# Patient Record
Sex: FEMALE | Race: WHITE | Employment: OTHER | URBAN - METROPOLITAN AREA
[De-identification: names, ages, dates, MRNs, and addresses within clinical notes are randomized per-mention and may not be internally consistent; named-entity substitution may affect disease eponyms.]

---

## 2017-02-09 ENCOUNTER — OFFICE VISIT (OUTPATIENT)
Dept: INTERNAL MEDICINE CLINIC | Facility: CLINIC | Age: 67
End: 2017-02-09

## 2017-02-09 VITALS
OXYGEN SATURATION: 98 % | SYSTOLIC BLOOD PRESSURE: 138 MMHG | DIASTOLIC BLOOD PRESSURE: 78 MMHG | BODY MASS INDEX: 51 KG/M2 | TEMPERATURE: 99 F | WEIGHT: 278.5 LBS | HEART RATE: 79 BPM | RESPIRATION RATE: 16 BRPM

## 2017-02-09 DIAGNOSIS — I10 ESSENTIAL HYPERTENSION: Primary | ICD-10-CM

## 2017-02-09 DIAGNOSIS — M17.0 OSTEOARTHRITIS OF BOTH KNEES, UNSPECIFIED OSTEOARTHRITIS TYPE: ICD-10-CM

## 2017-02-09 DIAGNOSIS — E78.5 HYPERLIPIDEMIA, UNSPECIFIED HYPERLIPIDEMIA TYPE: ICD-10-CM

## 2017-02-09 DIAGNOSIS — M19.071 PRIMARY OSTEOARTHRITIS OF RIGHT ANKLE: ICD-10-CM

## 2017-02-09 DIAGNOSIS — E55.9 VITAMIN D DEFICIENCY: ICD-10-CM

## 2017-02-09 DIAGNOSIS — R73.09 ELEVATED GLUCOSE: ICD-10-CM

## 2017-02-09 DIAGNOSIS — F32.A DEPRESSIVE DISORDER: ICD-10-CM

## 2017-02-09 PROCEDURE — 99214 OFFICE O/P EST MOD 30 MIN: CPT | Performed by: FAMILY MEDICINE

## 2017-02-09 RX ORDER — HYDROCODONE BITARTRATE AND ACETAMINOPHEN 5; 325 MG/1; MG/1
1 TABLET ORAL EVERY 6 HOURS PRN
Qty: 120 TABLET | Refills: 0 | Status: SHIPPED | OUTPATIENT
Start: 2017-04-10 | End: 2017-05-10

## 2017-02-09 RX ORDER — ATORVASTATIN CALCIUM 10 MG/1
10 TABLET, FILM COATED ORAL
Qty: 90 TABLET | Refills: 1 | Status: SHIPPED | OUTPATIENT
Start: 2017-02-09 | End: 2017-09-20

## 2017-02-09 RX ORDER — TRIAMTERENE AND HYDROCHLOROTHIAZIDE 37.5; 25 MG/1; MG/1
1 TABLET ORAL DAILY
Qty: 90 TABLET | Refills: 1 | Status: SHIPPED | OUTPATIENT
Start: 2017-02-09 | End: 2017-09-20

## 2017-02-09 RX ORDER — FLUOXETINE HYDROCHLORIDE 40 MG/1
40 CAPSULE ORAL DAILY
Qty: 90 CAPSULE | Refills: 1 | Status: SHIPPED | OUTPATIENT
Start: 2017-02-09 | End: 2017-12-26

## 2017-02-09 RX ORDER — HYDROCODONE BITARTRATE AND ACETAMINOPHEN 5; 325 MG/1; MG/1
1 TABLET ORAL EVERY 6 HOURS PRN
Qty: 120 TABLET | Refills: 0 | Status: SHIPPED | OUTPATIENT
Start: 2017-02-09 | End: 2017-03-11

## 2017-02-09 RX ORDER — FELODIPINE 10 MG/1
10 TABLET, EXTENDED RELEASE ORAL
Qty: 90 TABLET | Refills: 1 | Status: SHIPPED | OUTPATIENT
Start: 2017-02-09 | End: 2017-08-14

## 2017-02-09 RX ORDER — HYDROCODONE BITARTRATE AND ACETAMINOPHEN 5; 325 MG/1; MG/1
1 TABLET ORAL EVERY 6 HOURS PRN
Qty: 120 TABLET | Refills: 0 | Status: SHIPPED | OUTPATIENT
Start: 2017-03-11 | End: 2017-05-11

## 2017-02-09 NOTE — PROGRESS NOTES
CHIEF COMPLAINT:     Patient presents with: Follow - Up: check up      HPI:   Moise Gordon is a 61year old female   Patient presents for recheck of  Hypertension and hyperlipdemia.  Pt has been taking medications as instructed, no medication side effect HYDROcodone-acetaminophen (NORCO) 5-325 MG Oral Tab Take 1 tablet by mouth every 6 (six) hours as needed for Pain.  Disp: 120 tablet Rfl: 0   [START ON 4/10/2017] HYDROcodone-acetaminophen (NORCO) 5-325 MG Oral Tab Take 1 tablet by mouth every 6 (six) hours type  (primary encounter diagnosis)  Plan: COMP METABOLIC PANEL (14), LIPID PANEL,         Atorvastatin Calcium 10 MG Oral Tab, COMP         METABOLIC PANEL (14), LIPID PANEL        Pt to continue with exercise and increase as tolerated with arthritis.  Sug months.     Hyperlipidemia, unspecified hyperlipidemia type  (primary encounter diagnosis)  Essential hypertension  Osteoarthritis of both knees, unspecified osteoarthritis type  Primary osteoarthritis of right ankle  Vitamin d deficiency  Depressive disord

## 2017-03-03 LAB
ABSOLUTE BASOPHILS: 38 CELLS/UL (ref 0–200)
ABSOLUTE EOSINOPHILS: 309 CELLS/UL (ref 15–500)
ABSOLUTE LYMPHOCYTES: 1184 CELLS/UL (ref 850–3900)
ABSOLUTE MONOCYTES: 410 CELLS/UL (ref 200–950)
ABSOLUTE NEUTROPHILS: 4360 CELLS/UL (ref 1500–7800)
ALBUMIN/GLOBULIN RATIO: 1.3 (CALC) (ref 1–2.5)
ALBUMIN: 3.9 G/DL (ref 3.6–5.1)
ALKALINE PHOSPHATASE: 76 U/L (ref 33–130)
ALT: 32 U/L (ref 6–29)
AST: 23 U/L (ref 10–35)
BASOPHILS: 0.6 %
BILIRUBIN, TOTAL: 0.7 MG/DL (ref 0.2–1.2)
BUN: 13 MG/DL (ref 7–25)
CALCIUM: 9.4 MG/DL (ref 8.6–10.4)
CARBON DIOXIDE: 30 MMOL/L (ref 20–31)
CHLORIDE: 100 MMOL/L (ref 98–110)
CHOL/HDLC RATIO: 3.2 (CALC)
CHOLESTEROL, TOTAL: 166 MG/DL (ref 125–200)
CREATININE: 0.76 MG/DL (ref 0.5–0.99)
EGFR IF AFRICN AM: 96 ML/MIN/1.73M2
EGFR IF NONAFRICN AM: 83 ML/MIN/1.73M2
EOSINOPHILS: 4.9 %
GLOBULIN: 3.1 G/DL (CALC) (ref 1.9–3.7)
GLUCOSE: 146 MG/DL (ref 65–99)
HDL CHOLESTEROL: 52 MG/DL
HEMATOCRIT: 45.3 % (ref 35–45)
HEMOGLOBIN A1C: 6.6 % OF TOTAL HGB
HEMOGLOBIN: 14.8 G/DL (ref 11.7–15.5)
LDL-CHOLESTEROL: 89 MG/DL (CALC)
LYMPHOCYTES: 18.8 %
MCH: 28.8 PG (ref 27–33)
MCHC: 32.7 G/DL (ref 32–36)
MCV: 88.2 FL (ref 80–100)
MONOCYTES: 6.5 %
MPV: 8.9 FL (ref 7.5–12.5)
NEUTROPHILS: 69.2 %
NON-HDL CHOLESTEROL: 114 MG/DL (CALC)
PLATELET COUNT: 211 THOUSAND/UL (ref 140–400)
POTASSIUM: 4 MMOL/L (ref 3.5–5.3)
PROTEIN, TOTAL: 7 G/DL (ref 6.1–8.1)
RDW: 14.7 % (ref 11–15)
RED BLOOD CELL COUNT: 5.14 MILLION/UL (ref 3.8–5.1)
SODIUM: 139 MMOL/L (ref 135–146)
TRIGLYCERIDES: 127 MG/DL
VITAMIN D, 25-OH, TOTAL: 25 NG/ML (ref 30–100)
WHITE BLOOD CELL COUNT: 6.3 THOUSAND/UL (ref 3.8–10.8)

## 2017-03-08 ENCOUNTER — TELEPHONE (OUTPATIENT)
Dept: INTERNAL MEDICINE CLINIC | Facility: CLINIC | Age: 67
End: 2017-03-08

## 2017-03-08 RX ORDER — ERGOCALCIFEROL 1.25 MG/1
50000 CAPSULE ORAL WEEKLY
Qty: 12 CAPSULE | Refills: 1 | Status: SHIPPED | OUTPATIENT
Start: 2017-03-08 | End: 2017-09-20 | Stop reason: ALTCHOICE

## 2017-03-08 NOTE — TELEPHONE ENCOUNTER
Patient aware of lab results from 3/2/17 and [facility] recommendation.

## 2017-03-31 ENCOUNTER — OFFICE VISIT (OUTPATIENT)
Dept: FAMILY MEDICINE CLINIC | Facility: CLINIC | Age: 67
End: 2017-03-31

## 2017-03-31 ENCOUNTER — HOSPITAL ENCOUNTER (OUTPATIENT)
Dept: GENERAL RADIOLOGY | Age: 67
Discharge: HOME | End: 2017-03-31
Payer: COMMERCIAL

## 2017-03-31 VITALS
SYSTOLIC BLOOD PRESSURE: 138 MMHG | HEART RATE: 83 BPM | TEMPERATURE: 98 F | DIASTOLIC BLOOD PRESSURE: 78 MMHG | WEIGHT: 278 LBS | OXYGEN SATURATION: 98 % | BODY MASS INDEX: 51 KG/M2 | RESPIRATION RATE: 22 BRPM

## 2017-03-31 DIAGNOSIS — J20.9 ACUTE BRONCHITIS, UNSPECIFIED ORGANISM: ICD-10-CM

## 2017-03-31 DIAGNOSIS — J20.9 ACUTE BRONCHITIS, UNSPECIFIED ORGANISM: Primary | ICD-10-CM

## 2017-03-31 DIAGNOSIS — R05.9 COUGH: ICD-10-CM

## 2017-03-31 PROCEDURE — 99213 OFFICE O/P EST LOW 20 MIN: CPT

## 2017-03-31 PROCEDURE — 71020 XR CHEST PA + LAT CHEST (CPT=71020): CPT

## 2017-03-31 PROCEDURE — 94640 AIRWAY INHALATION TREATMENT: CPT

## 2017-03-31 RX ORDER — ALBUTEROL SULFATE 2.5 MG/3ML
2.5 SOLUTION RESPIRATORY (INHALATION) ONCE
Status: COMPLETED | OUTPATIENT
Start: 2017-03-31 | End: 2017-03-31

## 2017-03-31 RX ORDER — ALBUTEROL SULFATE 90 UG/1
2 AEROSOL, METERED RESPIRATORY (INHALATION) EVERY 6 HOURS PRN
Qty: 1 INHALER | Refills: 0 | Status: SHIPPED | OUTPATIENT
Start: 2017-03-31 | End: 2017-09-20

## 2017-03-31 RX ORDER — GUAIFENESIN AND CODEINE PHOSPHATE 100; 10 MG/5ML; MG/5ML
SOLUTION ORAL
Qty: 180 ML | Refills: 0 | Status: SHIPPED | OUTPATIENT
Start: 2017-03-31 | End: 2017-09-20

## 2017-03-31 RX ORDER — CEFDINIR 300 MG/1
300 CAPSULE ORAL 2 TIMES DAILY
Qty: 20 CAPSULE | Refills: 0 | Status: SHIPPED | OUTPATIENT
Start: 2017-03-31 | End: 2017-04-10

## 2017-03-31 RX ADMIN — ALBUTEROL SULFATE 2.5 MG: 2.5 SOLUTION RESPIRATORY (INHALATION) at 14:18:00

## 2017-03-31 NOTE — PROGRESS NOTES
CHIEF COMPLAINT:   Patient presents with:  URI: since 3/22/17    HPI:   Shon Swanson is a 61year old female who presents with upper respiratory symptoms for  9  days. Patient reports congestion, low grade fever, dry cough, wheezing.   is sick with • Depression           Past Surgical History    CHOLECYSTECTOMY      OTHER SURGICAL HISTORY  1996, 1997    Comment left femoral angiogram/angioplasty and stent    OTHER SURGICAL HISTORY  age 34    Comment right ankle fx (slipped on ice)      Family History Karyn Camp is a 61year old female who presents with upper respiratory symptoms that are consistent with    ASSESSMENT:   Acute bronchitis, unspecified organism  (primary encounter diagnosis)    PLAN: Meds as below.   Comfort care as described in [address] [place] [address] Bronchitis often occurs with a cold or the flu virus. The airways become inflamed (red and swollen). There is a deep “hacking” cough from the extra mucus.  Other symptoms may include:  · Wheezing  · Chest discomfort  · Shortness of breath  · Mild fever  A s · Ask your provider about using cough medicine, pain and fever medicine, or a decongestant. Antibiotics  Most cases of bronchitis are caused by cold or flu viruses. Antibiotics don’t treat viral illness.  Taking antibiotics when they are not needed increas

## 2017-05-11 ENCOUNTER — TELEPHONE (OUTPATIENT)
Dept: INTERNAL MEDICINE CLINIC | Facility: CLINIC | Age: 67
End: 2017-05-11

## 2017-05-11 DIAGNOSIS — M17.0 OSTEOARTHRITIS OF BOTH KNEES, UNSPECIFIED OSTEOARTHRITIS TYPE: Primary | ICD-10-CM

## 2017-05-11 RX ORDER — HYDROCODONE BITARTRATE AND ACETAMINOPHEN 5; 325 MG/1; MG/1
1 TABLET ORAL EVERY 6 HOURS PRN
Qty: 100 TABLET | Refills: 0 | Status: SHIPPED | OUTPATIENT
Start: 2017-05-11 | End: 2017-06-10

## 2017-05-11 NOTE — TELEPHONE ENCOUNTER
i will give her 100 w no refills   She is taking high amounts of narcotics which worries me   Why does she use so much ?

## 2017-05-11 NOTE — TELEPHONE ENCOUNTER
Takes coumadin , been on this med from Dr Aditya Mcgill for at least 5 years , normaly takes 1 tab every 6 hours for arthritis in hands knees ,feet Pt will  tomorrow

## 2017-06-07 ENCOUNTER — TELEPHONE (OUTPATIENT)
Dept: INTERNAL MEDICINE CLINIC | Facility: CLINIC | Age: 67
End: 2017-06-07

## 2017-06-07 DIAGNOSIS — M17.0 OSTEOARTHRITIS OF BOTH KNEES, UNSPECIFIED OSTEOARTHRITIS TYPE: Primary | ICD-10-CM

## 2017-06-08 RX ORDER — HYDROCODONE BITARTRATE AND ACETAMINOPHEN 5; 325 MG/1; MG/1
1 TABLET ORAL EVERY 6 HOURS PRN
Qty: 100 TABLET | Refills: 0 | Status: SHIPPED | OUTPATIENT
Start: 2017-08-07 | End: 2017-09-06

## 2017-06-08 RX ORDER — HYDROCODONE BITARTRATE AND ACETAMINOPHEN 5; 325 MG/1; MG/1
1 TABLET ORAL EVERY 6 HOURS PRN
Qty: 100 TABLET | Refills: 0 | Status: SHIPPED | OUTPATIENT
Start: 2017-06-08 | End: 2017-07-08

## 2017-06-08 RX ORDER — HYDROCODONE BITARTRATE AND ACETAMINOPHEN 5; 325 MG/1; MG/1
1 TABLET ORAL EVERY 6 HOURS PRN
Qty: 100 TABLET | Refills: 0 | Status: SHIPPED | OUTPATIENT
Start: 2017-07-08 | End: 2017-08-07

## 2017-06-27 ENCOUNTER — HOSPITAL (OUTPATIENT)
Dept: OTHER | Age: 67
End: 2017-06-27
Attending: INTERNAL MEDICINE

## 2017-06-27 LAB — INR BLDC: 1.4

## 2017-06-29 LAB — INR BLDC: 1.5

## 2017-07-01 ENCOUNTER — HOSPITAL (OUTPATIENT)
Dept: OTHER | Age: 67
End: 2017-07-01
Attending: INTERNAL MEDICINE

## 2017-07-03 LAB — INR BLDC: 2.8

## 2017-07-05 ENCOUNTER — TELEPHONE (OUTPATIENT)
Dept: INTERNAL MEDICINE CLINIC | Facility: CLINIC | Age: 67
End: 2017-07-05

## 2017-07-05 DIAGNOSIS — I10 ESSENTIAL HYPERTENSION: ICD-10-CM

## 2017-07-05 DIAGNOSIS — R73.09 OTHER ABNORMAL GLUCOSE: ICD-10-CM

## 2017-07-05 DIAGNOSIS — E55.9 VITAMIN D DEFICIENCY: ICD-10-CM

## 2017-07-05 DIAGNOSIS — E78.2 MIXED HYPERLIPIDEMIA: Primary | ICD-10-CM

## 2017-07-05 RX ORDER — HYDROCODONE BITARTRATE AND ACETAMINOPHEN 5; 325 MG/1; MG/1
1 TABLET ORAL EVERY 6 HOURS PRN
Qty: 30 TABLET | Refills: 0 | Status: SHIPPED | OUTPATIENT
Start: 2017-07-05 | End: 2017-07-05 | Stop reason: CLARIF

## 2017-07-05 RX ORDER — HYDROCODONE BITARTRATE AND ACETAMINOPHEN 5; 325 MG/1; MG/1
1 TABLET ORAL EVERY 6 HOURS PRN
Qty: 120 TABLET | Refills: 0 | Status: SHIPPED | OUTPATIENT
Start: 2017-09-03 | End: 2017-10-03

## 2017-07-05 RX ORDER — HYDROCODONE BITARTRATE AND ACETAMINOPHEN 5; 325 MG/1; MG/1
1 TABLET ORAL EVERY 6 HOURS PRN
Qty: 30 TABLET | Refills: 0 | Status: SHIPPED | OUTPATIENT
Start: 2017-08-04 | End: 2017-07-05 | Stop reason: CLARIF

## 2017-07-05 RX ORDER — HYDROCODONE BITARTRATE AND ACETAMINOPHEN 5; 325 MG/1; MG/1
1 TABLET ORAL EVERY 6 HOURS PRN
Qty: 30 TABLET | Refills: 0 | Status: SHIPPED | OUTPATIENT
Start: 2017-09-03 | End: 2017-07-05 | Stop reason: CLARIF

## 2017-07-05 RX ORDER — HYDROCODONE BITARTRATE AND ACETAMINOPHEN 5; 325 MG/1; MG/1
1 TABLET ORAL EVERY 6 HOURS PRN
Qty: 120 TABLET | Refills: 0 | Status: SHIPPED | OUTPATIENT
Start: 2017-08-04 | End: 2017-09-03

## 2017-07-05 RX ORDER — HYDROCODONE BITARTRATE AND ACETAMINOPHEN 5; 325 MG/1; MG/1
1 TABLET ORAL EVERY 6 HOURS PRN
Qty: 120 TABLET | Refills: 0 | Status: SHIPPED | OUTPATIENT
Start: 2017-07-05 | End: 2017-08-04

## 2017-07-05 NOTE — TELEPHONE ENCOUNTER
Patient informed scripts ready for pick-up, bring back incorrect scripts and request to speak with Violeta or Chandrakant Eubanks, patient verbalized understanding

## 2017-07-05 NOTE — TELEPHONE ENCOUNTER
Vidal Pitts for the [employer], they have been corrected, but Pt will need to bring back the July and august script that was done incorrectly. Pt is due for labs and an appointment at the end of AUgust /beginning of September. Her lab orders are in the EMR.

## 2017-07-06 ENCOUNTER — TELEPHONE (OUTPATIENT)
Dept: INTERNAL MEDICINE CLINIC | Facility: CLINIC | Age: 67
End: 2017-07-06

## 2017-07-06 NOTE — TELEPHONE ENCOUNTER
Patient returned 2 script for Marylou Sewell #100 each script 7/6/17.  New script for Norco 5/325 #120 (2) given to patient

## 2017-08-01 ENCOUNTER — HOSPITAL (OUTPATIENT)
Dept: OTHER | Age: 67
End: 2017-08-01
Attending: INTERNAL MEDICINE

## 2017-08-03 LAB — INR BLDC: 1.8

## 2017-08-14 DIAGNOSIS — I10 ESSENTIAL HYPERTENSION: ICD-10-CM

## 2017-08-17 RX ORDER — FELODIPINE 10 MG/1
10 TABLET, EXTENDED RELEASE ORAL DAILY
Qty: 30 TABLET | Refills: 0 | Status: SHIPPED | OUTPATIENT
Start: 2017-08-17 | End: 2017-09-20

## 2017-09-20 ENCOUNTER — OFFICE VISIT (OUTPATIENT)
Dept: INTERNAL MEDICINE CLINIC | Facility: CLINIC | Age: 67
End: 2017-09-20

## 2017-09-20 ENCOUNTER — APPOINTMENT (OUTPATIENT)
Dept: LAB | Age: 67
End: 2017-09-20
Attending: FAMILY MEDICINE
Payer: COMMERCIAL

## 2017-09-20 VITALS
OXYGEN SATURATION: 97 % | SYSTOLIC BLOOD PRESSURE: 138 MMHG | WEIGHT: 273.75 LBS | DIASTOLIC BLOOD PRESSURE: 80 MMHG | TEMPERATURE: 99 F | BODY MASS INDEX: 50 KG/M2 | HEART RATE: 63 BPM

## 2017-09-20 DIAGNOSIS — F32.A DEPRESSION, UNSPECIFIED DEPRESSION TYPE: ICD-10-CM

## 2017-09-20 DIAGNOSIS — E55.9 VITAMIN D DEFICIENCY: ICD-10-CM

## 2017-09-20 DIAGNOSIS — I10 ESSENTIAL HYPERTENSION: ICD-10-CM

## 2017-09-20 DIAGNOSIS — I10 ESSENTIAL HYPERTENSION: Primary | ICD-10-CM

## 2017-09-20 DIAGNOSIS — M19.071 OSTEOARTHRITIS OF RIGHT ANKLE, UNSPECIFIED OSTEOARTHRITIS TYPE: ICD-10-CM

## 2017-09-20 DIAGNOSIS — R73.09 OTHER ABNORMAL GLUCOSE: ICD-10-CM

## 2017-09-20 DIAGNOSIS — E78.2 MIXED HYPERLIPIDEMIA: ICD-10-CM

## 2017-09-20 LAB
25-HYDROXYVITAMIN D (TOTAL): 45.8 NG/ML (ref 30–100)
ALBUMIN SERPL-MCNC: 3.7 G/DL (ref 3.5–4.8)
ALP LIVER SERPL-CCNC: 75 U/L (ref 50–130)
ALT SERPL-CCNC: 34 U/L (ref 14–54)
AST SERPL-CCNC: 19 U/L (ref 15–41)
BILIRUB SERPL-MCNC: 0.8 MG/DL (ref 0.1–2)
BUN BLD-MCNC: 16 MG/DL (ref 8–20)
CALCIUM BLD-MCNC: 9.4 MG/DL (ref 8.3–10.3)
CHLORIDE: 101 MMOL/L (ref 101–111)
CHOLEST SMN-MCNC: 151 MG/DL (ref ?–200)
CO2: 31 MMOL/L (ref 22–32)
CREAT BLD-MCNC: 0.84 MG/DL (ref 0.55–1.02)
EST. AVERAGE GLUCOSE BLD GHB EST-MCNC: 131 MG/DL (ref 68–126)
GLUCOSE BLD-MCNC: 147 MG/DL (ref 70–99)
HBA1C MFR BLD HPLC: 6.2 % (ref ?–5.7)
HDLC SERPL-MCNC: 54 MG/DL (ref 45–?)
HDLC SERPL: 2.8 {RATIO} (ref ?–4.44)
LDLC SERPL CALC-MCNC: 71 MG/DL (ref ?–130)
LDLC SERPL-MCNC: 26 MG/DL (ref 5–40)
M PROTEIN MFR SERPL ELPH: 7.8 G/DL (ref 6.1–8.3)
NONHDLC SERPL-MCNC: 97 MG/DL (ref ?–130)
POTASSIUM SERPL-SCNC: 3.9 MMOL/L (ref 3.6–5.1)
SODIUM SERPL-SCNC: 138 MMOL/L (ref 136–144)
TRIGLYCERIDES: 132 MG/DL (ref ?–150)

## 2017-09-20 PROCEDURE — 99214 OFFICE O/P EST MOD 30 MIN: CPT | Performed by: FAMILY MEDICINE

## 2017-09-20 PROCEDURE — 80061 LIPID PANEL: CPT | Performed by: FAMILY MEDICINE

## 2017-09-20 PROCEDURE — 80053 COMPREHEN METABOLIC PANEL: CPT | Performed by: FAMILY MEDICINE

## 2017-09-20 PROCEDURE — 83036 HEMOGLOBIN GLYCOSYLATED A1C: CPT | Performed by: FAMILY MEDICINE

## 2017-09-20 PROCEDURE — 36415 COLL VENOUS BLD VENIPUNCTURE: CPT | Performed by: FAMILY MEDICINE

## 2017-09-20 PROCEDURE — 82306 VITAMIN D 25 HYDROXY: CPT | Performed by: FAMILY MEDICINE

## 2017-09-20 RX ORDER — HYDROCODONE BITARTRATE AND ACETAMINOPHEN 5; 325 MG/1; MG/1
1 TABLET ORAL EVERY 6 HOURS PRN
Qty: 120 TABLET | Refills: 0 | Status: SHIPPED | OUTPATIENT
Start: 2017-10-20 | End: 2017-11-19

## 2017-09-20 RX ORDER — HYDROCODONE BITARTRATE AND ACETAMINOPHEN 5; 325 MG/1; MG/1
1 TABLET ORAL EVERY 6 HOURS PRN
Qty: 120 TABLET | Refills: 0 | Status: SHIPPED | OUTPATIENT
Start: 2017-11-19 | End: 2017-12-19

## 2017-09-20 RX ORDER — ATORVASTATIN CALCIUM 10 MG/1
10 TABLET, FILM COATED ORAL
Qty: 90 TABLET | Refills: 1 | Status: SHIPPED | OUTPATIENT
Start: 2017-09-20 | End: 2018-03-21

## 2017-09-20 RX ORDER — HYDROCODONE BITARTRATE AND ACETAMINOPHEN 5; 325 MG/1; MG/1
1 TABLET ORAL EVERY 6 HOURS PRN
Qty: 120 TABLET | Refills: 0 | Status: SHIPPED | OUTPATIENT
Start: 2017-09-20 | End: 2017-10-20

## 2017-09-20 RX ORDER — FELODIPINE 10 MG/1
10 TABLET, EXTENDED RELEASE ORAL DAILY
Qty: 90 TABLET | Refills: 1 | Status: SHIPPED | OUTPATIENT
Start: 2017-09-20 | End: 2018-03-19

## 2017-09-20 RX ORDER — TRIAMTERENE AND HYDROCHLOROTHIAZIDE 37.5; 25 MG/1; MG/1
1 TABLET ORAL DAILY
Qty: 90 TABLET | Refills: 1 | Status: SHIPPED | OUTPATIENT
Start: 2017-09-20 | End: 2018-03-21

## 2017-09-20 NOTE — PROGRESS NOTES
CHIEF COMPLAINT:     Patient presents with:  Medication Follow-Up      HPI:   Ameena Rojas is a 74year old female   Patient presents for recheck of  Hypertension and hyperlipdemia.  Pt has been taking medications as instructed, no medication side effects TABLET BY MOUTH DAILY (Patient taking differently: TAKE 1 TABLET BY MOUTH MONDAY, [address], [address] [facility], [address] [facility].   TAKE 1/2 TABLET BY MOUTH WEDNESDAY, FRIDAY, SUNDAY.) Disp: 90 tablet Rfl: 0      Past Medical History:   Diagnosis Date   • Depression    • Hype Speech, mood and affect are appropriate      ASSESSMENT AND PLAN:     1. Mixed hyperlipidemia  Stable, continue low fat diet, exercise,loose weight  - atorvastatin 10 MG Oral Tab; Take 1 tablet (10 mg total) by mouth once daily. Dispense: 90 tablet;  Refi HYDROcodone-acetaminophen (NORCO) 5-325 MG Oral Tab 120 tablet 0      Sig: Take 1 tablet by mouth every 6 (six) hours as needed for Pain.       HYDROcodone-acetaminophen (NORCO) 5-325 MG Oral Tab 120 tablet 0      Sig: Take 1 tablet by mouth every 6 (six) h

## 2017-12-20 ENCOUNTER — TELEPHONE (OUTPATIENT)
Dept: INTERNAL MEDICINE CLINIC | Facility: CLINIC | Age: 67
End: 2017-12-20

## 2017-12-20 RX ORDER — HYDROCODONE BITARTRATE AND ACETAMINOPHEN 5; 325 MG/1; MG/1
1 TABLET ORAL EVERY 6 HOURS PRN
Qty: 120 TABLET | Refills: 0 | Status: SHIPPED | OUTPATIENT
Start: 2018-01-19 | End: 2018-02-18

## 2017-12-20 RX ORDER — HYDROCODONE BITARTRATE AND ACETAMINOPHEN 5; 325 MG/1; MG/1
1 TABLET ORAL EVERY 6 HOURS PRN
Qty: 120 TABLET | Refills: 0 | Status: SHIPPED | OUTPATIENT
Start: 2018-02-18 | End: 2018-03-20

## 2017-12-20 RX ORDER — HYDROCODONE BITARTRATE AND ACETAMINOPHEN 5; 325 MG/1; MG/1
1 TABLET ORAL EVERY 6 HOURS PRN
Qty: 120 TABLET | Refills: 0 | Status: SHIPPED | OUTPATIENT
Start: 2017-12-20 | End: 2018-01-19

## 2017-12-20 NOTE — TELEPHONE ENCOUNTER
Patient calling to request refill for hydrocone. Patient usually gets 3 refills and picks up prescription refill.  Please call patient back

## 2017-12-26 ENCOUNTER — TELEPHONE (OUTPATIENT)
Dept: INTERNAL MEDICINE CLINIC | Facility: CLINIC | Age: 67
End: 2017-12-26

## 2017-12-26 DIAGNOSIS — F32.A DEPRESSIVE DISORDER: ICD-10-CM

## 2017-12-26 RX ORDER — FLUOXETINE HYDROCHLORIDE 40 MG/1
40 CAPSULE ORAL DAILY
Qty: 90 CAPSULE | Refills: 0 | Status: SHIPPED | OUTPATIENT
Start: 2017-12-26 | End: 2018-03-21

## 2017-12-26 NOTE — TELEPHONE ENCOUNTER
Patient calling in requesting a refill for FLUoxetine HCl 40 MG Oral Cap    To be sent to:  [facility] ( on file)

## 2018-01-24 ENCOUNTER — PRIOR ORIGINAL RECORDS (OUTPATIENT)
Dept: OTHER | Age: 68
End: 2018-01-24

## 2018-02-01 ENCOUNTER — HOSPITAL (OUTPATIENT)
Dept: OTHER | Age: 68
End: 2018-02-01
Attending: INTERNAL MEDICINE

## 2018-02-01 LAB — INR BLDC: 2.7

## 2018-02-26 ENCOUNTER — PRIOR ORIGINAL RECORDS (OUTPATIENT)
Dept: OTHER | Age: 68
End: 2018-02-26

## 2018-02-26 ENCOUNTER — MYAURORA ACCOUNT LINK (OUTPATIENT)
Dept: OTHER | Age: 68
End: 2018-02-26

## 2018-03-17 ENCOUNTER — TELEPHONE (OUTPATIENT)
Dept: FAMILY MEDICINE CLINIC | Facility: CLINIC | Age: 68
End: 2018-03-17

## 2018-03-17 NOTE — TELEPHONE ENCOUNTER
Pt called with urinary frequency and burning. Sx just started today. No previous hx of UTI. No fever or chills. Recommend she can go to [address] for evaluation. Pt would prefer to wait and see [facility] on Monday. Encouraged her to increase water intake.   If

## 2018-03-19 DIAGNOSIS — I10 ESSENTIAL HYPERTENSION: ICD-10-CM

## 2018-03-19 RX ORDER — FELODIPINE 10 MG/1
10 TABLET, EXTENDED RELEASE ORAL DAILY
Qty: 30 TABLET | Refills: 0 | Status: SHIPPED | OUTPATIENT
Start: 2018-03-19 | End: 2018-03-21

## 2018-03-19 NOTE — TELEPHONE ENCOUNTER
Patient called to request refill for Felodipine ER 10 MG. Patient states she is out of medication.  Patient is scheduled for 03/21/18

## 2018-03-21 ENCOUNTER — OFFICE VISIT (OUTPATIENT)
Dept: INTERNAL MEDICINE CLINIC | Facility: CLINIC | Age: 68
End: 2018-03-21

## 2018-03-21 VITALS
OXYGEN SATURATION: 97 % | BODY MASS INDEX: 53 KG/M2 | RESPIRATION RATE: 14 BRPM | WEIGHT: 286.5 LBS | SYSTOLIC BLOOD PRESSURE: 122 MMHG | HEART RATE: 71 BPM | TEMPERATURE: 99 F | DIASTOLIC BLOOD PRESSURE: 82 MMHG

## 2018-03-21 DIAGNOSIS — M17.0 OSTEOARTHRITIS OF BOTH KNEES, UNSPECIFIED OSTEOARTHRITIS TYPE: ICD-10-CM

## 2018-03-21 DIAGNOSIS — Z13.29 SCREENING FOR THYROID DISORDER: ICD-10-CM

## 2018-03-21 DIAGNOSIS — Z78.0 POST-MENOPAUSAL: ICD-10-CM

## 2018-03-21 DIAGNOSIS — E78.5 HYPERLIPIDEMIA, UNSPECIFIED HYPERLIPIDEMIA TYPE: ICD-10-CM

## 2018-03-21 DIAGNOSIS — I10 HTN (HYPERTENSION), BENIGN: ICD-10-CM

## 2018-03-21 DIAGNOSIS — N39.0 URINARY TRACT INFECTION WITH HEMATURIA, SITE UNSPECIFIED: ICD-10-CM

## 2018-03-21 DIAGNOSIS — R73.09 OTHER ABNORMAL GLUCOSE: ICD-10-CM

## 2018-03-21 DIAGNOSIS — Z23 NEED FOR PNEUMOCOCCAL VACCINATION: ICD-10-CM

## 2018-03-21 DIAGNOSIS — Z12.31 ENCOUNTER FOR SCREENING MAMMOGRAM FOR MALIGNANT NEOPLASM OF BREAST: ICD-10-CM

## 2018-03-21 DIAGNOSIS — F32.A DEPRESSIVE DISORDER: ICD-10-CM

## 2018-03-21 DIAGNOSIS — E78.2 MIXED HYPERLIPIDEMIA: ICD-10-CM

## 2018-03-21 DIAGNOSIS — M19.071 OSTEOARTHRITIS OF RIGHT ANKLE, UNSPECIFIED OSTEOARTHRITIS TYPE: ICD-10-CM

## 2018-03-21 DIAGNOSIS — R31.9 URINARY TRACT INFECTION WITH HEMATURIA, SITE UNSPECIFIED: ICD-10-CM

## 2018-03-21 DIAGNOSIS — E55.9 VITAMIN D DEFICIENCY: ICD-10-CM

## 2018-03-21 DIAGNOSIS — Z13.0 SCREENING FOR IRON DEFICIENCY ANEMIA: ICD-10-CM

## 2018-03-21 DIAGNOSIS — Z12.11 SCREEN FOR COLON CANCER: ICD-10-CM

## 2018-03-21 DIAGNOSIS — I10 ESSENTIAL HYPERTENSION: Primary | ICD-10-CM

## 2018-03-21 DIAGNOSIS — F32.A DEPRESSION, UNSPECIFIED DEPRESSION TYPE: ICD-10-CM

## 2018-03-21 LAB
BILIRUBIN: NEGATIVE
GLUCOSE (URINE DIPSTICK): NEGATIVE MG/DL
KETONES (URINE DIPSTICK): NEGATIVE MG/DL
MULTISTIX LOT#: ABNORMAL NUMERIC
NITRITE, URINE: POSITIVE
PH, URINE: 6 (ref 4.5–8)
PROTEIN (URINE DIPSTICK): 100 MG/DL
SPECIFIC GRAVITY: 1.01 (ref 1–1.03)
URINE-COLOR: YELLOW
UROBILINOGEN,SEMI-QN: 0.2 MG/DL (ref 0–1.9)

## 2018-03-21 PROCEDURE — 87186 SC STD MICRODIL/AGAR DIL: CPT | Performed by: FAMILY MEDICINE

## 2018-03-21 PROCEDURE — 99214 OFFICE O/P EST MOD 30 MIN: CPT | Performed by: FAMILY MEDICINE

## 2018-03-21 PROCEDURE — 87086 URINE CULTURE/COLONY COUNT: CPT | Performed by: FAMILY MEDICINE

## 2018-03-21 PROCEDURE — 90670 PCV13 VACCINE IM: CPT | Performed by: FAMILY MEDICINE

## 2018-03-21 PROCEDURE — 81003 URINALYSIS AUTO W/O SCOPE: CPT | Performed by: FAMILY MEDICINE

## 2018-03-21 PROCEDURE — 87077 CULTURE AEROBIC IDENTIFY: CPT | Performed by: FAMILY MEDICINE

## 2018-03-21 PROCEDURE — 90471 IMMUNIZATION ADMIN: CPT | Performed by: FAMILY MEDICINE

## 2018-03-21 RX ORDER — HYDROCODONE BITARTRATE AND ACETAMINOPHEN 5; 325 MG/1; MG/1
1 TABLET ORAL EVERY 6 HOURS PRN
COMMUNITY
End: 2019-03-06

## 2018-03-21 RX ORDER — TRIAMTERENE AND HYDROCHLOROTHIAZIDE 37.5; 25 MG/1; MG/1
1 TABLET ORAL DAILY
Qty: 90 TABLET | Refills: 0 | Status: SHIPPED | OUTPATIENT
Start: 2018-03-21 | End: 2018-06-24

## 2018-03-21 RX ORDER — HYDROCODONE BITARTRATE AND ACETAMINOPHEN 5; 325 MG/1; MG/1
1 TABLET ORAL EVERY 8 HOURS PRN
Qty: 120 TABLET | Refills: 0 | Status: SHIPPED | OUTPATIENT
Start: 2018-03-21 | End: 2018-04-02 | Stop reason: CLARIF

## 2018-03-21 RX ORDER — NITROFURANTOIN 25; 75 MG/1; MG/1
100 CAPSULE ORAL 2 TIMES DAILY
Qty: 14 CAPSULE | Refills: 0 | Status: SHIPPED | OUTPATIENT
Start: 2018-03-21 | End: 2018-06-11 | Stop reason: ALTCHOICE

## 2018-03-21 RX ORDER — FELODIPINE 10 MG/1
10 TABLET, EXTENDED RELEASE ORAL DAILY
Qty: 90 TABLET | Refills: 0 | Status: SHIPPED | OUTPATIENT
Start: 2018-03-21 | End: 2018-07-17

## 2018-03-21 RX ORDER — FLUOXETINE HYDROCHLORIDE 40 MG/1
40 CAPSULE ORAL DAILY
Qty: 90 CAPSULE | Refills: 0 | Status: SHIPPED | OUTPATIENT
Start: 2018-03-21 | End: 2018-06-24

## 2018-03-21 RX ORDER — HYDROCODONE BITARTRATE AND ACETAMINOPHEN 5; 325 MG/1; MG/1
1 TABLET ORAL EVERY 8 HOURS PRN
Qty: 120 TABLET | Refills: 0 | Status: SHIPPED | OUTPATIENT
Start: 2018-05-20 | End: 2018-04-02 | Stop reason: CLARIF

## 2018-03-21 RX ORDER — ATORVASTATIN CALCIUM 10 MG/1
10 TABLET, FILM COATED ORAL
Qty: 90 TABLET | Refills: 0 | Status: SHIPPED | OUTPATIENT
Start: 2018-03-21 | End: 2018-04-27

## 2018-03-21 RX ORDER — HYDROCODONE BITARTRATE AND ACETAMINOPHEN 5; 325 MG/1; MG/1
1 TABLET ORAL EVERY 8 HOURS PRN
Qty: 120 TABLET | Refills: 0 | Status: SHIPPED | OUTPATIENT
Start: 2018-04-20 | End: 2018-04-02 | Stop reason: CLARIF

## 2018-03-21 RX ORDER — GLUCOSAMINE HCL 500 MG
1 TABLET ORAL DAILY
COMMUNITY
End: 2018-09-12

## 2018-03-21 NOTE — PROGRESS NOTES
CHIEF COMPLAINT:     Patient presents with:  Medication Follow-Up  UTI      HPI:   Emily Gonzalez is a 74year old female   Patient presents for recheck of  Hypertension and hyperlipdemia.  Pt has been taking medications as instructed, no medication side ef (MAXZIDE-25) 37.5-25 MG Oral Tab Take 1 tablet by mouth daily. Disp: 90 tablet Rfl: 0   FLUoxetine HCl 40 MG Oral Cap Take 1 capsule (40 mg total) by mouth daily.  Disp: 90 capsule Rfl: 0   Felodipine ER 10 MG Oral Tablet 24 Hr Take 1 tablet (10 mg total) b Denies shortness of breath, cough, or wheezing  : see HPI  NEURO: Denies headaches or lightheadedness  PSYCH: see HPI    EXAM:   /82   Pulse 71   Temp 98.5 °F (36.9 °C) (Oral)   Resp 14   Wt 286 lb 8 oz   SpO2 97%   Breastfeeding?  No   BMI 52.83 kg tablet 0      Sig: Take 1 tablet by mouth daily. FLUoxetine HCl 40 MG Oral Cap 90 capsule 0      Sig: Take 1 capsule (40 mg total) by mouth daily.       Felodipine ER 10 MG Oral Tablet 24 Hr 90 tablet 0      Sig: Take 1 tablet (10 mg total) by mouth da increase exercise,  choose better fats,  increase fiber and avoid processed foods. - atorvastatin 10 MG Oral Tab; Take 1 tablet (10 mg total) by mouth once daily. Dispense: 90 tablet; Refill: 0    8.  Depressive disorder  - stable, continue medication patient is asked to return in 6 months.

## 2018-04-02 ENCOUNTER — TELEPHONE (OUTPATIENT)
Dept: INTERNAL MEDICINE CLINIC | Facility: CLINIC | Age: 68
End: 2018-04-02

## 2018-04-02 RX ORDER — HYDROCODONE BITARTRATE AND ACETAMINOPHEN 5; 325 MG/1; MG/1
1 TABLET ORAL EVERY 6 HOURS PRN
Qty: 120 TABLET | Refills: 0 | Status: SHIPPED | OUTPATIENT
Start: 2018-06-01 | End: 2018-07-01

## 2018-04-02 RX ORDER — HYDROCODONE BITARTRATE AND ACETAMINOPHEN 5; 325 MG/1; MG/1
1 TABLET ORAL EVERY 6 HOURS PRN
Qty: 120 TABLET | Refills: 0 | Status: SHIPPED | OUTPATIENT
Start: 2018-04-02 | End: 2018-05-02

## 2018-04-02 RX ORDER — HYDROCODONE BITARTRATE AND ACETAMINOPHEN 5; 325 MG/1; MG/1
1 TABLET ORAL EVERY 6 HOURS PRN
Qty: 120 TABLET | Refills: 0 | Status: SHIPPED | OUTPATIENT
Start: 2018-05-02 | End: 2018-06-01

## 2018-04-02 NOTE — TELEPHONE ENCOUNTER
Pt advised to bring back 3 prescriptions [facility] has first month. she will call them and see if she can still  original rx she dropped off she never picked up pills yet

## 2018-04-02 NOTE — TELEPHONE ENCOUNTER
Patient recently received RX for Norco; the pharmacist is questioning dose prescribed because per patient it has always been every 8 hours taking it; on RX sent it says every 6 hours. Please call to discuss with patient.   Patient did not  RX until s

## 2018-04-27 PROBLEM — N95.0 POSTMENOPAUSAL BLEEDING: Status: ACTIVE | Noted: 2018-04-27

## 2018-06-08 ENCOUNTER — HOSPITAL (OUTPATIENT)
Dept: OTHER | Age: 68
End: 2018-06-08
Attending: INTERNAL MEDICINE

## 2018-06-08 LAB — INR BLDC: 2.6

## 2018-06-11 ENCOUNTER — OFFICE VISIT (OUTPATIENT)
Dept: INTERNAL MEDICINE CLINIC | Facility: CLINIC | Age: 68
End: 2018-06-11

## 2018-06-11 ENCOUNTER — APPOINTMENT (OUTPATIENT)
Dept: LAB | Age: 68
End: 2018-06-11
Attending: OBSTETRICS & GYNECOLOGY
Payer: COMMERCIAL

## 2018-06-11 ENCOUNTER — PRIOR ORIGINAL RECORDS (OUTPATIENT)
Dept: OTHER | Age: 68
End: 2018-06-11

## 2018-06-11 ENCOUNTER — LAB ENCOUNTER (OUTPATIENT)
Dept: LAB | Age: 68
End: 2018-06-11
Attending: OBSTETRICS & GYNECOLOGY
Payer: COMMERCIAL

## 2018-06-11 VITALS
WEIGHT: 291.5 LBS | TEMPERATURE: 99 F | HEART RATE: 67 BPM | BODY MASS INDEX: 53.64 KG/M2 | SYSTOLIC BLOOD PRESSURE: 128 MMHG | DIASTOLIC BLOOD PRESSURE: 86 MMHG | OXYGEN SATURATION: 98 % | HEIGHT: 61.75 IN | RESPIRATION RATE: 16 BRPM

## 2018-06-11 DIAGNOSIS — I10 ESSENTIAL HYPERTENSION: ICD-10-CM

## 2018-06-11 DIAGNOSIS — R73.09 OTHER ABNORMAL GLUCOSE: ICD-10-CM

## 2018-06-11 DIAGNOSIS — R93.89 THICKENED ENDOMETRIUM: ICD-10-CM

## 2018-06-11 DIAGNOSIS — Z13.29 SCREENING FOR THYROID DISORDER: ICD-10-CM

## 2018-06-11 DIAGNOSIS — Z01.818 PRE-OPERATIVE CLEARANCE: ICD-10-CM

## 2018-06-11 DIAGNOSIS — Z01.818 PRE-OP EXAM: Primary | ICD-10-CM

## 2018-06-11 DIAGNOSIS — E55.9 VITAMIN D DEFICIENCY: ICD-10-CM

## 2018-06-11 DIAGNOSIS — N95.0 POST-MENOPAUSAL BLEEDING: ICD-10-CM

## 2018-06-11 DIAGNOSIS — Z13.0 SCREENING FOR IRON DEFICIENCY ANEMIA: ICD-10-CM

## 2018-06-11 DIAGNOSIS — E78.5 HYPERLIPIDEMIA, UNSPECIFIED HYPERLIPIDEMIA TYPE: ICD-10-CM

## 2018-06-11 PROCEDURE — 84443 ASSAY THYROID STIM HORMONE: CPT

## 2018-06-11 PROCEDURE — 82306 VITAMIN D 25 HYDROXY: CPT

## 2018-06-11 PROCEDURE — 85730 THROMBOPLASTIN TIME PARTIAL: CPT

## 2018-06-11 PROCEDURE — 85025 COMPLETE CBC W/AUTO DIFF WBC: CPT

## 2018-06-11 PROCEDURE — 83036 HEMOGLOBIN GLYCOSYLATED A1C: CPT

## 2018-06-11 PROCEDURE — 93010 ELECTROCARDIOGRAM REPORT: CPT | Performed by: INTERNAL MEDICINE

## 2018-06-11 PROCEDURE — 36415 COLL VENOUS BLD VENIPUNCTURE: CPT

## 2018-06-11 PROCEDURE — 99243 OFF/OP CNSLTJ NEW/EST LOW 30: CPT | Performed by: FAMILY MEDICINE

## 2018-06-11 PROCEDURE — 85610 PROTHROMBIN TIME: CPT

## 2018-06-11 PROCEDURE — 80053 COMPREHEN METABOLIC PANEL: CPT

## 2018-06-11 PROCEDURE — 80061 LIPID PANEL: CPT

## 2018-06-11 PROCEDURE — 93005 ELECTROCARDIOGRAM TRACING: CPT

## 2018-06-11 RX ORDER — HYDROCODONE BITARTRATE AND ACETAMINOPHEN 5; 325 MG/1; MG/1
1 TABLET ORAL EVERY 6 HOURS PRN
Qty: 120 TABLET | Refills: 0 | Status: SHIPPED | OUTPATIENT
Start: 2018-06-11 | End: 2018-07-11

## 2018-06-11 RX ORDER — HYDROCODONE BITARTRATE AND ACETAMINOPHEN 5; 325 MG/1; MG/1
1 TABLET ORAL EVERY 6 HOURS PRN
Qty: 120 TABLET | Refills: 0 | Status: SHIPPED | OUTPATIENT
Start: 2018-07-11 | End: 2018-08-10

## 2018-06-11 RX ORDER — HYDROCODONE BITARTRATE AND ACETAMINOPHEN 5; 325 MG/1; MG/1
1 TABLET ORAL EVERY 6 HOURS PRN
Qty: 120 TABLET | Refills: 0 | Status: SHIPPED | OUTPATIENT
Start: 2018-08-10 | End: 2018-09-09

## 2018-06-11 NOTE — PROGRESS NOTES
Chuck Roman is a 79year old female who presents for a pre-operative physical exam. Patient is to have Hysteroscopy w/ resection of uterine polyp and Endometrial ablation, to be done by Dr. Gasper Sawant at [facility] Warfarin Sodium (COUMADIN) 5 MG Oral Tab TAKE 1 TABLET BY MOUTH DAILY (Patient taking differently: TAKE 1 TABLET BY MOUTH MONDAY, [address], [address] [facility], [address] [place] [address].   TAKE 1/2 TABLET BY MOUTH WEDNESDAY, FRIDAY, SUNDAY.) Disp: 90 tablet Rfl: 0      Allergies: depression  HEMATOLOGIC: denies hx of anemia  ENDOCRINE: denies thyroid history, + obesity and hx of vit d def.   ALL/ASTHMA: denies hx of allergy or asthma    EXAM:   /86   Pulse 67   Temp 98.8 °F (37.1 °C) (Oral)   Resp 16   Ht 61.75\"   Wt 291 lb 8

## 2018-06-12 PROBLEM — I70.209 ATHEROSCLEROSIS OF ARTERIES OF EXTREMITIES: Status: ACTIVE | Noted: 2018-06-12

## 2018-06-12 PROBLEM — I74.3: Status: ACTIVE | Noted: 2018-06-12

## 2018-06-12 PROBLEM — I70.209 ATHEROSCLEROSIS OF ARTERIES OF EXTREMITIES (HCC): Status: ACTIVE | Noted: 2018-06-12

## 2018-06-15 ENCOUNTER — TELEPHONE (OUTPATIENT)
Dept: INTERNAL MEDICINE CLINIC | Facility: CLINIC | Age: 68
End: 2018-06-15

## 2018-06-15 DIAGNOSIS — E55.9 VITAMIN D DEFICIENCY: Primary | ICD-10-CM

## 2018-06-15 DIAGNOSIS — R74.01 ALT (SGPT) LEVEL RAISED: ICD-10-CM

## 2018-06-15 DIAGNOSIS — R79.9 ABNORMAL BLOOD CHEMISTRY: ICD-10-CM

## 2018-06-15 RX ORDER — ERGOCALCIFEROL 1.25 MG/1
CAPSULE ORAL
Qty: 12 CAPSULE | Refills: 1 | OUTPATIENT
Start: 2018-06-15

## 2018-06-15 RX ORDER — ERGOCALCIFEROL 1.25 MG/1
50000 CAPSULE ORAL WEEKLY
Qty: 12 CAPSULE | Refills: 1 | Status: SHIPPED | OUTPATIENT
Start: 2018-06-15 | End: 2018-07-15

## 2018-06-18 ENCOUNTER — PRIOR ORIGINAL RECORDS (OUTPATIENT)
Dept: OTHER | Age: 68
End: 2018-06-18

## 2018-06-18 LAB
ALBUMIN: 3.7 G/DL
ALKALINE PHOSPHATATE(ALK PHOS): 71 IU/L
BILIRUBIN TOTAL: 0.8 MG/DL
BUN: 13 MG/DL
CALCIUM: 9.5 MG/DL
CHLORIDE: 99 MEQ/L
CREATININE, SERUM: 0.92 MG/DL
GLUCOSE: 145 MG/DL
HEMATOCRIT: 48.1 %
HEMOGLOBIN: 15.5 G/DL
MCH: 29.5 PG
MCHC: 32.2 G/DL
MCV: 91.4 FL
POLYS: 239 %
POTASSIUM, SERUM: 3.9 MEQ/L
PROTEIN, TOTAL: 8 G/DL
RED BLOOD COUNT: 5.26 X 10-6/U
SGOT (AST): 40 IU/L
SGPT (ALT): 61 IU/L
SODIUM: 138 MEQ/L
WHITE BLOOD COUNT: 7.8 X 10-3/U

## 2018-06-19 ENCOUNTER — HOSPITAL (OUTPATIENT)
Dept: OTHER | Age: 68
End: 2018-06-19
Attending: OBSTETRICS & GYNECOLOGY

## 2018-06-19 LAB
INR PPP: 1.9
PROTHROMBIN TIME: 18.8 SECONDS (ref 9.7–11.8)
PROTHROMBIN TIME: ABNORMAL

## 2018-06-20 ENCOUNTER — PRIOR ORIGINAL RECORDS (OUTPATIENT)
Dept: OTHER | Age: 68
End: 2018-06-20

## 2018-06-21 ENCOUNTER — PRIOR ORIGINAL RECORDS (OUTPATIENT)
Dept: OTHER | Age: 68
End: 2018-06-21

## 2018-06-24 DIAGNOSIS — I10 ESSENTIAL HYPERTENSION: ICD-10-CM

## 2018-06-24 DIAGNOSIS — E78.2 MIXED HYPERLIPIDEMIA: ICD-10-CM

## 2018-06-24 DIAGNOSIS — F32.A DEPRESSIVE DISORDER: ICD-10-CM

## 2018-06-25 RX ORDER — TRIAMTERENE AND HYDROCHLOROTHIAZIDE 37.5; 25 MG/1; MG/1
1 TABLET ORAL DAILY
Qty: 90 TABLET | Refills: 1 | Status: SHIPPED | OUTPATIENT
Start: 2018-06-25 | End: 2018-09-12

## 2018-06-25 RX ORDER — ATORVASTATIN CALCIUM 10 MG/1
TABLET, FILM COATED ORAL
Qty: 90 TABLET | Refills: 1 | Status: SHIPPED | OUTPATIENT
Start: 2018-06-25 | End: 2018-09-12

## 2018-06-25 RX ORDER — FLUOXETINE HYDROCHLORIDE 40 MG/1
CAPSULE ORAL
Qty: 90 CAPSULE | Refills: 1 | Status: SHIPPED | OUTPATIENT
Start: 2018-06-25 | End: 2018-09-12

## 2018-07-17 DIAGNOSIS — I10 ESSENTIAL HYPERTENSION: ICD-10-CM

## 2018-07-17 PROBLEM — N95.0 POSTMENOPAUSAL BLEEDING: Status: RESOLVED | Noted: 2018-04-27 | Resolved: 2018-07-17

## 2018-07-17 RX ORDER — FELODIPINE 10 MG/1
TABLET, EXTENDED RELEASE ORAL
Qty: 90 TABLET | Refills: 0 | Status: SHIPPED | OUTPATIENT
Start: 2018-07-17 | End: 2018-09-12

## 2018-09-12 ENCOUNTER — LAB ENCOUNTER (OUTPATIENT)
Dept: LAB | Age: 68
End: 2018-09-12
Attending: FAMILY MEDICINE
Payer: COMMERCIAL

## 2018-09-12 ENCOUNTER — PRIOR ORIGINAL RECORDS (OUTPATIENT)
Dept: OTHER | Age: 68
End: 2018-09-12

## 2018-09-12 ENCOUNTER — OFFICE VISIT (OUTPATIENT)
Dept: INTERNAL MEDICINE CLINIC | Facility: CLINIC | Age: 68
End: 2018-09-12
Payer: COMMERCIAL

## 2018-09-12 VITALS
WEIGHT: 286.25 LBS | BODY MASS INDEX: 53 KG/M2 | HEART RATE: 68 BPM | DIASTOLIC BLOOD PRESSURE: 80 MMHG | RESPIRATION RATE: 16 BRPM | TEMPERATURE: 99 F | OXYGEN SATURATION: 97 % | SYSTOLIC BLOOD PRESSURE: 148 MMHG

## 2018-09-12 DIAGNOSIS — F32.A DEPRESSION, UNSPECIFIED DEPRESSION TYPE: ICD-10-CM

## 2018-09-12 DIAGNOSIS — R74.01 ALT (SGPT) LEVEL RAISED: ICD-10-CM

## 2018-09-12 DIAGNOSIS — E78.2 MIXED HYPERLIPIDEMIA: ICD-10-CM

## 2018-09-12 DIAGNOSIS — I10 ESSENTIAL HYPERTENSION: Primary | ICD-10-CM

## 2018-09-12 DIAGNOSIS — I74.3 EMBOLISM AND THROMBOSIS OF ARTERIES OF LOWER EXTREMITY (HCC): Primary | ICD-10-CM

## 2018-09-12 DIAGNOSIS — E55.9 VITAMIN D DEFICIENCY: ICD-10-CM

## 2018-09-12 DIAGNOSIS — M05.741 RHEUMATOID ARTHRITIS INVOLVING BOTH HANDS WITH POSITIVE RHEUMATOID FACTOR (HCC): ICD-10-CM

## 2018-09-12 DIAGNOSIS — E11.9 DIABETES MELLITUS WITHOUT COMPLICATION (HCC): ICD-10-CM

## 2018-09-12 DIAGNOSIS — M05.742 RHEUMATOID ARTHRITIS INVOLVING BOTH HANDS WITH POSITIVE RHEUMATOID FACTOR (HCC): ICD-10-CM

## 2018-09-12 DIAGNOSIS — E78.5 HYPERLIPIDEMIA, UNSPECIFIED HYPERLIPIDEMIA TYPE: ICD-10-CM

## 2018-09-12 DIAGNOSIS — I10 ESSENTIAL HYPERTENSION: ICD-10-CM

## 2018-09-12 DIAGNOSIS — R79.9 ABNORMAL BLOOD CHEMISTRY: ICD-10-CM

## 2018-09-12 DIAGNOSIS — I74.3 EMBOLISM AND THROMBOSIS OF ARTERIES OF LOWER EXTREMITIES (HCC): ICD-10-CM

## 2018-09-12 PROBLEM — M06.9 RHEUMATOID ARTHRITIS (HCC): Status: ACTIVE | Noted: 2018-09-12

## 2018-09-12 LAB
ALBUMIN SERPL-MCNC: 3.7 G/DL (ref 3.5–4.8)
ALBUMIN/GLOB SERPL: 0.9 {RATIO} (ref 1–2)
ALP LIVER SERPL-CCNC: 80 U/L (ref 55–142)
ALT SERPL-CCNC: 67 U/L (ref 14–54)
ANION GAP SERPL CALC-SCNC: 5 MMOL/L (ref 0–18)
AST SERPL-CCNC: 40 U/L (ref 15–41)
BILIRUB SERPL-MCNC: 0.7 MG/DL (ref 0.1–2)
BUN BLD-MCNC: 10 MG/DL (ref 8–20)
BUN/CREAT SERPL: 11.9 (ref 10–20)
CALCIUM BLD-MCNC: 9.7 MG/DL (ref 8.3–10.3)
CHLORIDE SERPL-SCNC: 102 MMOL/L (ref 101–111)
CHOLEST SMN-MCNC: 157 MG/DL (ref ?–200)
CO2 SERPL-SCNC: 32 MMOL/L (ref 22–32)
CREAT BLD-MCNC: 0.84 MG/DL (ref 0.55–1.02)
EST. AVERAGE GLUCOSE BLD GHB EST-MCNC: 160 MG/DL (ref 68–126)
GLOBULIN PLAS-MCNC: 4.2 G/DL (ref 2.5–4)
GLUCOSE BLD-MCNC: 158 MG/DL (ref 70–99)
HBA1C MFR BLD HPLC: 7.2 % (ref ?–5.7)
HDLC SERPL-MCNC: 51 MG/DL (ref 40–59)
INR BLD: 2.18 (ref 0.9–1.1)
INR: 2.18
LDLC SERPL CALC-MCNC: 72 MG/DL (ref ?–100)
M PROTEIN MFR SERPL ELPH: 7.9 G/DL (ref 6.1–8.3)
NONHDLC SERPL-MCNC: 106 MG/DL (ref ?–130)
OSMOLALITY SERPL CALC.SUM OF ELEC: 290 MOSM/KG (ref 275–295)
POTASSIUM SERPL-SCNC: 4.2 MMOL/L (ref 3.6–5.1)
PSA SERPL DL<=0.01 NG/ML-MCNC: 25 SECONDS (ref 12.4–14.7)
SODIUM SERPL-SCNC: 139 MMOL/L (ref 136–144)
TRIGL SERPL-MCNC: 169 MG/DL (ref 30–149)
VLDLC SERPL CALC-MCNC: 34 MG/DL (ref 0–30)

## 2018-09-12 PROCEDURE — 99214 OFFICE O/P EST MOD 30 MIN: CPT | Performed by: FAMILY MEDICINE

## 2018-09-12 PROCEDURE — 83036 HEMOGLOBIN GLYCOSYLATED A1C: CPT | Performed by: FAMILY MEDICINE

## 2018-09-12 PROCEDURE — 36415 COLL VENOUS BLD VENIPUNCTURE: CPT | Performed by: FAMILY MEDICINE

## 2018-09-12 PROCEDURE — 80053 COMPREHEN METABOLIC PANEL: CPT | Performed by: FAMILY MEDICINE

## 2018-09-12 PROCEDURE — 80061 LIPID PANEL: CPT | Performed by: FAMILY MEDICINE

## 2018-09-12 PROCEDURE — 85610 PROTHROMBIN TIME: CPT | Performed by: FAMILY MEDICINE

## 2018-09-12 RX ORDER — ATORVASTATIN CALCIUM 10 MG/1
TABLET, FILM COATED ORAL
Qty: 90 TABLET | Refills: 1 | Status: SHIPPED | OUTPATIENT
Start: 2018-09-12 | End: 2019-03-06

## 2018-09-12 RX ORDER — FELODIPINE 10 MG/1
TABLET, EXTENDED RELEASE ORAL
Qty: 90 TABLET | Refills: 0 | Status: SHIPPED | OUTPATIENT
Start: 2018-09-12 | End: 2019-01-10

## 2018-09-12 RX ORDER — TRIAMTERENE AND HYDROCHLOROTHIAZIDE 37.5; 25 MG/1; MG/1
1 TABLET ORAL DAILY
Qty: 90 TABLET | Refills: 1 | Status: SHIPPED | OUTPATIENT
Start: 2018-09-12 | End: 2019-03-06

## 2018-09-12 RX ORDER — HYDROCODONE BITARTRATE AND ACETAMINOPHEN 5; 325 MG/1; MG/1
1 TABLET ORAL EVERY 6 HOURS PRN
Qty: 120 TABLET | Refills: 0 | Status: SHIPPED | OUTPATIENT
Start: 2018-10-12 | End: 2018-11-11

## 2018-09-12 RX ORDER — FLUOXETINE HYDROCHLORIDE 40 MG/1
CAPSULE ORAL
Qty: 90 CAPSULE | Refills: 1 | Status: SHIPPED | OUTPATIENT
Start: 2018-09-12 | End: 2019-03-06

## 2018-09-12 RX ORDER — HYDROCODONE BITARTRATE AND ACETAMINOPHEN 5; 325 MG/1; MG/1
1 TABLET ORAL EVERY 6 HOURS PRN
Qty: 120 TABLET | Refills: 0 | Status: SHIPPED | OUTPATIENT
Start: 2018-09-12 | End: 2018-10-12

## 2018-09-12 RX ORDER — CHOLECALCIFEROL (VITAMIN D3) 1250 MCG
1 CAPSULE ORAL WEEKLY
COMMUNITY
End: 2021-06-14 | Stop reason: ALTCHOICE

## 2018-09-12 RX ORDER — HYDROCODONE BITARTRATE AND ACETAMINOPHEN 5; 325 MG/1; MG/1
1 TABLET ORAL EVERY 6 HOURS PRN
Qty: 120 TABLET | Refills: 0 | Status: SHIPPED | OUTPATIENT
Start: 2018-11-11 | End: 2018-12-11

## 2018-09-12 NOTE — PROGRESS NOTES
HPI:   Janet Schmidt is a 79year old female who presents for recheck of her diabetes. Patient’s FBS - Pt has not been chcking  Last visit with ophthalmologist was 5/23/18 with Dr. Carlyn Rouse at [facility].   Pt has been checking her feet on a reg (H)   09/20/2017 6.2 (H)   08/10/2016 6.4 (H)     CHOLESTEROL, TOTAL (mg/dL)   Date Value   03/02/2017 166   02/04/2016 170   08/11/2015 165     Cholesterol, Total (mg/dL)   Date Value   06/11/2018 161   09/20/2017 151   08/10/2016 195     HDL Cholesterol (500 mg total) by mouth 2 (two) times daily with meals. Disp: 60 tablet Rfl: 2   HYDROcodone-acetaminophen 5-325 MG Oral Tab Take 1 tablet by mouth every 6 (six) hours as needed for Pain.  Disp:  Rfl:    Warfarin Sodium (COUMADIN) 5 MG Oral Tab TAKE 1 TABLE no abnormal sensation, no vertigo. Eddye Cozier EXAM:   /80   Pulse 68   Temp 98.6 °F (37 °C) (Oral)   Resp 16   Wt 286 lb 4 oz   SpO2 97%   Breastfeeding?  No   BMI 52.78 kg/m²   GENERAL: well developed, well nourished,in no apparent distress, pleasant, obe fats,  increase fiber and avoid processed foods.      (F32.9) Depression, unspecified depression type  Plan: FLUoxetine HCl 40 MG Oral Cap        - stable, continue medication    (E55.9) Vitamin D deficiency  Plan: Cholecalciferol (VITAMIN D3) 57888 units

## 2018-09-13 RX ORDER — METFORMIN HYDROCHLORIDE 1000 MG/1
1000 TABLET, FILM COATED, EXTENDED RELEASE ORAL
Qty: 90 TABLET | Refills: 0 | Status: SHIPPED | OUTPATIENT
Start: 2018-09-13 | End: 2018-09-19

## 2018-09-17 ENCOUNTER — TELEPHONE (OUTPATIENT)
Dept: INTERNAL MEDICINE CLINIC | Facility: CLINIC | Age: 68
End: 2018-09-17

## 2018-09-17 DIAGNOSIS — E11.9 DIABETES MELLITUS WITHOUT COMPLICATION (HCC): Primary | ICD-10-CM

## 2018-09-17 NOTE — TELEPHONE ENCOUNTER
Received fax from [address] ([address], [address]) stating THE MODIFIED FORMULATION IS QUITE $$$, DID YOU MEAN TO SEND FOR THE MODIFIED OR JUST REGULAR METFORMIN? PLEASE ADVISE. ..

## 2018-09-17 NOTE — TELEPHONE ENCOUNTER
Julita called regarding prescription for: MetFORMIN HCl ER, MOD, 1000 MG (MOD) Oral Tablet 24 Hr  1000 MG not covered by insurance.  Requesting new prescription for 500 MG instead    [address] [place] [address] [facility]

## 2018-09-17 NOTE — TELEPHONE ENCOUNTER
BC/BS will not cover Metformin ER 1,000 mg daily, please do not issue a prior authorization as even with p/a too expensive, per patient, she would like to stay with Metformin 500 mg even with the GI issues, please advise

## 2018-09-17 NOTE — TELEPHONE ENCOUNTER
Aware , message forwarded to [facility] Since Pt's numbers are still not great she would need to go to Metformin 1000 mg one bid. #180 with 0 refills instead of the extended release kind.  Pt needs another hgba1c in 3 monthsy : [facility] new order

## 2018-09-17 NOTE — TELEPHONE ENCOUNTER
Since Pt's numbers are still not great she would need to go to Metformin 1000 mg one bid. #180 with 0 refills instead of the extended release kind.  Pt needs another hgba1c in 3 months

## 2018-09-19 ENCOUNTER — TELEPHONE (OUTPATIENT)
Dept: INTERNAL MEDICINE CLINIC | Facility: CLINIC | Age: 68
End: 2018-09-19

## 2018-09-19 RX ORDER — METFORMIN HYDROCHLORIDE 500 MG/1
TABLET, EXTENDED RELEASE ORAL
Qty: 60 TABLET | Refills: 0 | Status: SHIPPED | OUTPATIENT
Start: 2018-09-19 | End: 2018-09-19 | Stop reason: ALTCHOICE

## 2018-09-19 NOTE — TELEPHONE ENCOUNTER
Pt states that she spoke with nurse and she was going to send her INR results to coumadin clinic but never got results would like a call back

## 2018-09-19 NOTE — TELEPHONE ENCOUNTER
Patient called stating that she was switched to Metformin 1000 MG once daily due to GI issues, however it is not covered by insurance and very expensive out of pocket.  Therefore, patient is requesting to be switched back to 500 MG twice daily

## 2018-09-19 NOTE — TELEPHONE ENCOUNTER
Patient informed of orders for Metformin (Glucophage) 1, 000 mg 1 tablet BID, repeat a1c in 3 months.  Patient verbalized understanding

## 2018-09-19 NOTE — TELEPHONE ENCOUNTER
Patient called again.  Requesting INR results to be sent to Coumadin clinic through [address] [place] [address] at [facility]

## 2018-09-19 NOTE — TELEPHONE ENCOUNTER
Protocol failed - Microalbumin procedure in past 12 months or taking ACE/ARB    Patient is requesting 500 mg due to high insurance cost of the 1000 mg.      Medication(s) to Refill:   Requested Prescriptions     Pending Prescriptions Disp Refills   • METFOR

## 2018-09-20 NOTE — TELEPHONE ENCOUNTER
I think Pt seeing the DEC would be good for the Pt, especially since she is a diabetic on medication now. If Pt agrees,ok to put the order in.

## 2018-11-14 ENCOUNTER — LAB SERVICES (OUTPATIENT)
Dept: OTHER | Age: 68
End: 2018-11-14

## 2018-11-14 ENCOUNTER — PRIOR ORIGINAL RECORDS (OUTPATIENT)
Dept: OTHER | Age: 68
End: 2018-11-14

## 2018-11-14 ENCOUNTER — NURSE ONLY (OUTPATIENT)
Dept: ENDOCRINOLOGY CLINIC | Facility: CLINIC | Age: 68
End: 2018-11-14
Payer: COMMERCIAL

## 2018-11-14 ENCOUNTER — HOSPITAL (OUTPATIENT)
Dept: OTHER | Age: 68
End: 2018-11-14
Attending: INTERNAL MEDICINE

## 2018-11-14 VITALS — HEIGHT: 62 IN | BODY MASS INDEX: 51.71 KG/M2 | WEIGHT: 281 LBS

## 2018-11-14 DIAGNOSIS — E11.9 DIABETES MELLITUS WITHOUT COMPLICATION (HCC): ICD-10-CM

## 2018-11-14 DIAGNOSIS — E11.65 TYPE 2 DIABETES MELLITUS WITH HYPERGLYCEMIA, WITHOUT LONG-TERM CURRENT USE OF INSULIN (HCC): Primary | ICD-10-CM

## 2018-11-14 LAB
INR BLDC: 2.4
INR BLDC: 2.4
INR BLDC: NORMAL
INR: 2.4

## 2018-11-14 PROCEDURE — G0108 DIAB MANAGE TRN  PER INDIV: HCPCS

## 2018-11-14 RX ORDER — FLASH GLUCOSE SENSOR
1 KIT MISCELLANEOUS
Qty: 2 EACH | Refills: 5 | Status: SHIPPED | OUTPATIENT
Start: 2018-11-14 | End: 2020-12-17

## 2018-11-14 RX ORDER — FLASH GLUCOSE SCANNING READER
1 EACH MISCELLANEOUS ONCE
Qty: 1 DEVICE | Refills: 0 | Status: SHIPPED | OUTPATIENT
Start: 2018-11-14 | End: 2018-11-14

## 2018-11-14 NOTE — PROGRESS NOTES
Marivel Lund  [facility] was seen for Diabetic Education Initial visit    Date: 11/14/2018   Start time: 9am End time: 10am      Assessment: Ht 62\"   Wt 281 lb   BMI 51.40 kg/m²     HEMOGLOBIN A1c (% of total Hgb)   Date Value   03/02/2017 6.6 (H)

## 2018-11-28 ENCOUNTER — NURSE ONLY (OUTPATIENT)
Dept: ENDOCRINOLOGY CLINIC | Facility: CLINIC | Age: 68
End: 2018-11-28
Payer: COMMERCIAL

## 2018-11-28 VITALS — HEIGHT: 62 IN | BODY MASS INDEX: 51.53 KG/M2 | WEIGHT: 280 LBS

## 2018-11-28 DIAGNOSIS — E11.65 TYPE 2 DIABETES MELLITUS WITH HYPERGLYCEMIA, WITHOUT LONG-TERM CURRENT USE OF INSULIN (HCC): Primary | ICD-10-CM

## 2018-11-28 PROCEDURE — G0108 DIAB MANAGE TRN  PER INDIV: HCPCS

## 2018-11-28 NOTE — PROGRESS NOTES
Dulce Maria Martinez  : 1950 was seen for Diabetic Education Follow up:    Date: 2018   Start time: 11am End time: 11:30am    Assessment:     Assessment: Ht 62\"   Wt 280 lb   BMI 51.21 kg/m²      HEMOGLOBIN A1c (% of total Hgb)   Date Value    CO2 32.0 22.0 - 32.0 mmol/L    Anion Gap 5 0 - 18 mmol/L    BUN 10 8 - 20 mg/dL    Creatinine 0.84 0.55 - 1.02 mg/dL    BUN/CREA Ratio 11.9 10.0 - 20.0    Calcium, Total 9.7 8.3 - 10.3 mg/dL    Calculated Osmolality 290 275 - 295 mOsm/kg    GFR, Non-Sandrine discussed. [facility] depression and anxiety form given and reviewed. Family involvement/support encouraged  Depression and diabetes reviewed. Recommendations:      1. Follow recommended meal plan.    2. Test BG fasting and 2 hours po

## 2018-12-03 RX ORDER — ERGOCALCIFEROL 1.25 MG/1
CAPSULE ORAL
Qty: 12 CAPSULE | Refills: 0 | OUTPATIENT
Start: 2018-12-03

## 2018-12-03 NOTE — TELEPHONE ENCOUNTER
Last OV relevant to medication: 6/11/18  Last refill date: 6-15-18    #/refills: 1  When pt was asked to return for OV: N/A  Upcoming appt/reason: none

## 2018-12-10 ENCOUNTER — TELEPHONE (OUTPATIENT)
Dept: INTERNAL MEDICINE CLINIC | Facility: CLINIC | Age: 68
End: 2018-12-10

## 2018-12-10 DIAGNOSIS — E55.9 VITAMIN D DEFICIENCY: ICD-10-CM

## 2018-12-10 RX ORDER — HYDROCODONE BITARTRATE AND ACETAMINOPHEN 5; 325 MG/1; MG/1
1 TABLET ORAL EVERY 6 HOURS PRN
Qty: 120 TABLET | Refills: 0 | Status: SHIPPED | OUTPATIENT
Start: 2019-01-09 | End: 2019-02-08

## 2018-12-10 RX ORDER — HYDROCODONE BITARTRATE AND ACETAMINOPHEN 5; 325 MG/1; MG/1
1 TABLET ORAL EVERY 6 HOURS PRN
Qty: 120 TABLET | Refills: 0 | Status: SHIPPED | OUTPATIENT
Start: 2018-12-10 | End: 2019-01-09

## 2018-12-10 RX ORDER — HYDROCODONE BITARTRATE AND ACETAMINOPHEN 5; 325 MG/1; MG/1
1 TABLET ORAL EVERY 6 HOURS PRN
Qty: 120 TABLET | Refills: 0 | Status: SHIPPED | OUTPATIENT
Start: 2019-02-08 | End: 2019-03-10

## 2018-12-11 RX ORDER — CHOLECALCIFEROL (VITAMIN D3) 1250 MCG
1 CAPSULE ORAL WEEKLY
Qty: 1 CAPSULE | Refills: 0 | OUTPATIENT
Start: 2018-12-11

## 2018-12-11 NOTE — TELEPHONE ENCOUNTER
Requesting Cholecalciferol (VITAMIN D3) 72339 units Oral Cap  LOV: 9/12/18  RTC: 6 months  Last Relevant Labs: 6/11/18    Future Appointments   Date Time Provider Amelia Ng   1/9/2019 11:00 AM Dany Masterson, RN, CDE [id number]

## 2018-12-20 NOTE — TELEPHONE ENCOUNTER
Refill requested:   Requested Prescriptions     Pending Prescriptions Disp Refills   • MetFORMIN HCl 1000 MG Oral Tab [Pharmacy Med Name: METFORMIN 1000MG TABLETS] 180 tablet 0     Sig: Take 1 tablet (1,000 mg total) by mouth 2 (two) times daily with meals 1-hour appointment taught by a registered dietitian and diabetes educator. Half of this appointment time is dedicated to the development of a personalized education plan. This plan is developed privately in a 1:1 session with the educator.  The other half

## 2019-01-09 ENCOUNTER — NURSE ONLY (OUTPATIENT)
Dept: ENDOCRINOLOGY CLINIC | Facility: CLINIC | Age: 69
End: 2019-01-09
Payer: COMMERCIAL

## 2019-01-09 VITALS — HEIGHT: 62 IN | BODY MASS INDEX: 50.24 KG/M2 | WEIGHT: 273 LBS

## 2019-01-09 DIAGNOSIS — E11.65 TYPE 2 DIABETES MELLITUS WITH HYPERGLYCEMIA, WITHOUT LONG-TERM CURRENT USE OF INSULIN (HCC): Primary | ICD-10-CM

## 2019-01-09 PROCEDURE — G0108 DIAB MANAGE TRN  PER INDIV: HCPCS

## 2019-01-09 NOTE — PROGRESS NOTES
Cruz Carrero  : 1950 was seen for Diabetic Education Follow up:    Date: 2019   Start time: 11 am  End time: 11:30pm    Assessment:     Assessment: Ht 62\"   Wt 273 lb   BMI 49.93 kg/m²      HEMOGLOBIN A1c (% of total Hgb)   Date Value    Glucose 158 (H) 70 - 99 mg/dL    Sodium 139 136 - 144 mmol/L    Potassium 4.2 3.6 - 5.1 mmol/L    Chloride 102 101 - 111 mmol/L    CO2 32.0 22.0 - 32.0 mmol/L    Anion Gap 5 0 - 18 mmol/L    BUN 10 8 - 20 mg/dL    Creatinine 0.84 0.55 - 1.02 mg/dL    BUN/C BG fasting and 2 hours post meals 2 times/day. 3. Bring glucose logs to all provider visits for review. 4. Encouraged activity if no restrictions. 5. Encouraged Karolina Ortiz to call diabetes center with any questions or concerns.     Script sent to

## 2019-01-10 DIAGNOSIS — I10 ESSENTIAL HYPERTENSION: ICD-10-CM

## 2019-01-10 RX ORDER — FELODIPINE 10 MG/1
TABLET, EXTENDED RELEASE ORAL
Qty: 90 TABLET | Refills: 0 | Status: SHIPPED | OUTPATIENT
Start: 2019-01-10 | End: 2019-03-06

## 2019-01-10 NOTE — TELEPHONE ENCOUNTER
Refill requested:   Requested Prescriptions     Pending Prescriptions Disp Refills   • FELODIPINE ER 10 MG Oral Tablet 24 Hr [Pharmacy Med Name: FELODIPINE 10MG ER TABLETS] 90 tablet 0     Sig: TAKE 1 TABLET(10 MG) BY MOUTH DAILY         Passed protocol INDIVIDUAL FOLLOW-UP APPOINTMENTS To be determined at appointment  INITIAL INDIVIDUAL ASSESSMENTS An assessment and meter training will begin this appointment. Educational needs will be discussed at that time.   STEP ONE SATISFIER This is a 1-hour appointme

## 2019-02-28 VITALS
HEIGHT: 62 IN | HEART RATE: 64 BPM | SYSTOLIC BLOOD PRESSURE: 140 MMHG | BODY MASS INDEX: 52.44 KG/M2 | RESPIRATION RATE: 16 BRPM | WEIGHT: 285 LBS | DIASTOLIC BLOOD PRESSURE: 76 MMHG

## 2019-03-06 ENCOUNTER — APPOINTMENT (OUTPATIENT)
Dept: LAB | Age: 69
End: 2019-03-06
Attending: FAMILY MEDICINE
Payer: COMMERCIAL

## 2019-03-06 ENCOUNTER — NURSE ONLY (OUTPATIENT)
Dept: ENDOCRINOLOGY CLINIC | Facility: CLINIC | Age: 69
End: 2019-03-06
Payer: COMMERCIAL

## 2019-03-06 ENCOUNTER — TELEPHONE (OUTPATIENT)
Dept: INTERNAL MEDICINE CLINIC | Facility: CLINIC | Age: 69
End: 2019-03-06

## 2019-03-06 ENCOUNTER — OFFICE VISIT (OUTPATIENT)
Dept: INTERNAL MEDICINE CLINIC | Facility: CLINIC | Age: 69
End: 2019-03-06
Payer: COMMERCIAL

## 2019-03-06 VITALS
WEIGHT: 275.5 LBS | HEIGHT: 62 IN | BODY MASS INDEX: 50.7 KG/M2 | HEART RATE: 70 BPM | OXYGEN SATURATION: 97 % | RESPIRATION RATE: 20 BRPM | SYSTOLIC BLOOD PRESSURE: 138 MMHG | TEMPERATURE: 98 F | DIASTOLIC BLOOD PRESSURE: 86 MMHG

## 2019-03-06 DIAGNOSIS — M05.741 RHEUMATOID ARTHRITIS INVOLVING BOTH HANDS WITH POSITIVE RHEUMATOID FACTOR (HCC): ICD-10-CM

## 2019-03-06 DIAGNOSIS — M17.0 OSTEOARTHRITIS OF BOTH KNEES, UNSPECIFIED OSTEOARTHRITIS TYPE: ICD-10-CM

## 2019-03-06 DIAGNOSIS — M05.742 RHEUMATOID ARTHRITIS INVOLVING BOTH HANDS WITH POSITIVE RHEUMATOID FACTOR (HCC): ICD-10-CM

## 2019-03-06 DIAGNOSIS — I74.3 EMBOLISM AND THROMBOSIS OF ARTERIES OF LOWER EXTREMITIES (HCC): ICD-10-CM

## 2019-03-06 DIAGNOSIS — Z12.11 ENCOUNTER FOR SCREENING FECAL OCCULT BLOOD TESTING: ICD-10-CM

## 2019-03-06 DIAGNOSIS — F32.A DEPRESSION, UNSPECIFIED DEPRESSION TYPE: ICD-10-CM

## 2019-03-06 DIAGNOSIS — E78.2 MIXED HYPERLIPIDEMIA: ICD-10-CM

## 2019-03-06 DIAGNOSIS — L30.4 INTERTRIGO: ICD-10-CM

## 2019-03-06 DIAGNOSIS — E55.9 VITAMIN D DEFICIENCY: ICD-10-CM

## 2019-03-06 DIAGNOSIS — E78.49 OTHER HYPERLIPIDEMIA: ICD-10-CM

## 2019-03-06 DIAGNOSIS — E11.65 TYPE 2 DIABETES MELLITUS WITH HYPERGLYCEMIA, WITHOUT LONG-TERM CURRENT USE OF INSULIN (HCC): Primary | ICD-10-CM

## 2019-03-06 DIAGNOSIS — E11.9 DIABETES MELLITUS WITHOUT COMPLICATION (HCC): ICD-10-CM

## 2019-03-06 DIAGNOSIS — R74.8 ELEVATED LIVER ENZYMES: ICD-10-CM

## 2019-03-06 DIAGNOSIS — E11.9 DIABETES MELLITUS WITHOUT COMPLICATION (HCC): Primary | ICD-10-CM

## 2019-03-06 DIAGNOSIS — I10 ESSENTIAL HYPERTENSION: ICD-10-CM

## 2019-03-06 LAB
ALT SERPL-CCNC: 40 U/L (ref 13–56)
CHOLEST SMN-MCNC: 173 MG/DL (ref ?–200)
EST. AVERAGE GLUCOSE BLD GHB EST-MCNC: 143 MG/DL (ref 68–126)
HBA1C MFR BLD HPLC: 6.6 % (ref ?–5.7)
HDLC SERPL-MCNC: 55 MG/DL (ref 40–59)
INR BLD: 1.81 (ref 0.9–1.1)
INR PPP: 1.81
LDLC SERPL CALC-MCNC: 87 MG/DL (ref ?–100)
NONHDLC SERPL-MCNC: 118 MG/DL (ref ?–130)
PSA SERPL DL<=0.01 NG/ML-MCNC: 22 SECONDS (ref 12.5–14.7)
TRIGL SERPL-MCNC: 153 MG/DL (ref 30–149)
VIT D+METAB SERPL-MCNC: 34.1 NG/ML (ref 30–100)
VLDLC SERPL CALC-MCNC: 31 MG/DL (ref 0–30)

## 2019-03-06 PROCEDURE — 99214 OFFICE O/P EST MOD 30 MIN: CPT | Performed by: FAMILY MEDICINE

## 2019-03-06 PROCEDURE — 84460 ALANINE AMINO (ALT) (SGPT): CPT | Performed by: FAMILY MEDICINE

## 2019-03-06 PROCEDURE — 36415 COLL VENOUS BLD VENIPUNCTURE: CPT | Performed by: FAMILY MEDICINE

## 2019-03-06 PROCEDURE — G0108 DIAB MANAGE TRN  PER INDIV: HCPCS

## 2019-03-06 PROCEDURE — 82306 VITAMIN D 25 HYDROXY: CPT | Performed by: FAMILY MEDICINE

## 2019-03-06 PROCEDURE — 83036 HEMOGLOBIN GLYCOSYLATED A1C: CPT | Performed by: FAMILY MEDICINE

## 2019-03-06 PROCEDURE — 80061 LIPID PANEL: CPT | Performed by: FAMILY MEDICINE

## 2019-03-06 PROCEDURE — 85610 PROTHROMBIN TIME: CPT | Performed by: FAMILY MEDICINE

## 2019-03-06 RX ORDER — NYSTATIN 100000 [USP'U]/G
1 POWDER TOPICAL 4 TIMES DAILY
Qty: 60 G | Refills: 1 | Status: SHIPPED | OUTPATIENT
Start: 2019-03-06 | End: 2019-09-19

## 2019-03-06 RX ORDER — HYDROCODONE BITARTRATE AND ACETAMINOPHEN 5; 325 MG/1; MG/1
1 TABLET ORAL EVERY 6 HOURS PRN
Qty: 120 TABLET | Refills: 0 | Status: SHIPPED | OUTPATIENT
Start: 2019-04-05 | End: 2019-05-05

## 2019-03-06 RX ORDER — HYDROCODONE BITARTRATE AND ACETAMINOPHEN 5; 325 MG/1; MG/1
1 TABLET ORAL EVERY 6 HOURS PRN
Qty: 120 TABLET | Refills: 0 | Status: SHIPPED | OUTPATIENT
Start: 2019-05-05 | End: 2019-06-04

## 2019-03-06 RX ORDER — ATORVASTATIN CALCIUM 10 MG/1
TABLET, FILM COATED ORAL
Qty: 90 TABLET | Refills: 0 | Status: SHIPPED | OUTPATIENT
Start: 2019-03-06 | End: 2019-06-05

## 2019-03-06 RX ORDER — FLUOXETINE HYDROCHLORIDE 40 MG/1
CAPSULE ORAL
Qty: 90 CAPSULE | Refills: 0 | Status: SHIPPED | OUTPATIENT
Start: 2019-03-06 | End: 2019-06-05

## 2019-03-06 RX ORDER — FELODIPINE 10 MG/1
TABLET, EXTENDED RELEASE ORAL
Qty: 90 TABLET | Refills: 0 | Status: SHIPPED | OUTPATIENT
Start: 2019-03-06 | End: 2019-07-07

## 2019-03-06 RX ORDER — HYDROCODONE BITARTRATE AND ACETAMINOPHEN 5; 325 MG/1; MG/1
1 TABLET ORAL EVERY 6 HOURS PRN
Qty: 120 TABLET | Refills: 0 | Status: SHIPPED | OUTPATIENT
Start: 2019-03-06 | End: 2019-04-05

## 2019-03-06 RX ORDER — TRIAMTERENE AND HYDROCHLOROTHIAZIDE 37.5; 25 MG/1; MG/1
1 TABLET ORAL DAILY
Qty: 90 TABLET | Refills: 0 | Status: SHIPPED | OUTPATIENT
Start: 2019-03-06 | End: 2019-06-05

## 2019-03-06 NOTE — PROGRESS NOTES
HPI:   Karolina Ortiz is a 79year old female who presents for recheck of her diabetes.    Patient’s FBS have been 120-152's   Last visit with ophthalmologist was 5/23/18 with Dr. Arjun Bruce at [facility]  Pt has been checking her feet on a regular b 03/02/2017 6.6 (H)   08/11/2015 6.3 (H)   08/20/2014 5.8 (H)     HgbA1C (%)   Date Value   09/12/2018 7.2 (H)   06/11/2018 7.7 (H)   09/20/2017 6.2 (H)     Cholesterol, Total (mg/dL)   Date Value   09/12/2018 157   06/11/2018 161   09/20/2017 151     CHO FLUoxetine HCl 40 MG Oral Cap TAKE 1 CAPSULE(40 MG) BY MOUTH DAILY Disp: 90 capsule Rfl: 1   Warfarin Sodium (COUMADIN) 5 MG Oral Tab TAKE 1 TABLET BY MOUTH DAILY (Patient taking differently: TAKE 1 TABLET BY MOUTH MONDAY, TUESDAY, [address] [facility], SATURDAY.   Jeffrey Lemus developed, well nourished,in no apparent distress, pleasant, obese weight. SKIN: redden macular rash under the lower abdomen, no rash under the breasts at present. HEENT: NC/AT, PERRLA, EOMs intact b/l. Ears clear b/l, throat without erythema or exudate. 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 Diabetes mellitus without complication ([facility] [address]  Continue present medications. Fasting labs due today  Please see your specialists as recommended.   Keep u these issues and agrees to the plan. The patient is asked to return in 6 months, sooner if needed.

## 2019-03-06 NOTE — PROGRESS NOTES
Chuck Roman  : 1950 was seen for Diabetic Education Follow up:    Date: 3/6/2019  Referring Provider: Leticia Pringle   Start time: 10:30  End time: 11:30    Assessment:     Assessment: There were no vitals taken for this visit.      HEMOGLOBIN A1c (% doing   BG today: 155 mg/dL fasting    Taking Medication  Reviewed medication  use, action, timing, and side effects. Injectables: Site selection, rotation, action, timing, and side effects reviewed.   She is to increase her Metformin to 1500 mg in the pm

## 2019-03-06 NOTE — TELEPHONE ENCOUNTER
Faxed over medical release for DM eye exam. Placed copy to be scanned and other in [address] file.

## 2019-03-07 ENCOUNTER — TELEPHONE (OUTPATIENT)
Dept: INTERNAL MEDICINE CLINIC | Facility: CLINIC | Age: 69
End: 2019-03-07

## 2019-03-07 ENCOUNTER — ANTI-COAG (OUTPATIENT)
Dept: CARDIOLOGY | Age: 69
End: 2019-03-07

## 2019-03-07 DIAGNOSIS — I74.3 EMBOLISM AND THROMBOSIS OF ARTERIES OF LOWER EXTREMITY (CMD): ICD-10-CM

## 2019-03-07 NOTE — TELEPHONE ENCOUNTER
Sent INR that bianca gave me. Faxed to Dr. Jerald Manrique and placed in paper work file on my desk.

## 2019-03-12 NOTE — TELEPHONE ENCOUNTER
Received latest eye exam. Paper work does not say diabetic eye exams however. Placed on [facility] in basket to review.

## 2019-03-14 NOTE — TELEPHONE ENCOUNTER
Spoke with a rep. To get clarification if pt has had diabetic eye exam there. During cataract eye exam, they do full exam including diabetic eye exam. Rep. Stated that everything is in fax forms she sent over.

## 2019-03-20 ENCOUNTER — ANTI-COAG (OUTPATIENT)
Dept: CARDIOLOGY | Age: 69
End: 2019-03-20

## 2019-03-20 DIAGNOSIS — I74.3 EMBOLISM AND THROMBOSIS OF ARTERIES OF LOWER EXTREMITY (CMD): ICD-10-CM

## 2019-04-24 ENCOUNTER — NURSE ONLY (OUTPATIENT)
Dept: ENDOCRINOLOGY CLINIC | Facility: CLINIC | Age: 69
End: 2019-04-24
Payer: COMMERCIAL

## 2019-04-24 VITALS — HEIGHT: 62 IN | WEIGHT: 275 LBS | BODY MASS INDEX: 50.61 KG/M2

## 2019-04-24 DIAGNOSIS — E11.65 TYPE 2 DIABETES MELLITUS WITH HYPERGLYCEMIA, WITHOUT LONG-TERM CURRENT USE OF INSULIN (HCC): Primary | ICD-10-CM

## 2019-04-24 PROCEDURE — G0108 DIAB MANAGE TRN  PER INDIV: HCPCS

## 2019-04-24 NOTE — PROGRESS NOTES
Macy Christina  : 1950 was seen for Diabetic Education Follow up:    Date: 2019  Referring Provider: Danny Preston Start time: 10:30am  End time: 11am    Assessment:     Assessment: Ht 62\"   Wt 275 lb   BMI 50.30 kg/m²      HEMOGLOBIN A1c (% of medication use, action, timing, and side effects. Reducing Risk  Overview of complications reviewed including eye, dental, CV health, renal protection, and foot care discussed.     Health Coping  Family involvement/support encouraged  Depression and tavo

## 2019-06-05 DIAGNOSIS — I10 ESSENTIAL HYPERTENSION: ICD-10-CM

## 2019-06-05 DIAGNOSIS — F32.A DEPRESSION, UNSPECIFIED DEPRESSION TYPE: ICD-10-CM

## 2019-06-05 DIAGNOSIS — E11.9 DIABETES MELLITUS WITHOUT COMPLICATION (HCC): ICD-10-CM

## 2019-06-05 DIAGNOSIS — E78.2 MIXED HYPERLIPIDEMIA: ICD-10-CM

## 2019-06-06 RX ORDER — ATORVASTATIN CALCIUM 10 MG/1
TABLET, FILM COATED ORAL
Qty: 90 TABLET | Refills: 0 | Status: SHIPPED | OUTPATIENT
Start: 2019-06-06 | End: 2019-09-19

## 2019-06-06 RX ORDER — FLUOXETINE HYDROCHLORIDE 40 MG/1
CAPSULE ORAL
Qty: 90 CAPSULE | Refills: 0 | Status: SHIPPED | OUTPATIENT
Start: 2019-06-06 | End: 2019-09-19

## 2019-06-06 RX ORDER — TRIAMTERENE AND HYDROCHLOROTHIAZIDE 37.5; 25 MG/1; MG/1
1 TABLET ORAL DAILY
Qty: 90 TABLET | Refills: 0 | Status: SHIPPED | OUTPATIENT
Start: 2019-06-06 | End: 2019-09-19

## 2019-06-06 NOTE — TELEPHONE ENCOUNTER
Triamterene-hctz  Last OV relevant to medication: 3-6-19  Last refill date: 3-6-19  #/refills: 0  When pt was asked to return for OV: 6months   Upcoming appt/reason: none  Recent labs: 9-12-18: CMP     Atorvastatin  Last OV relevant to medication: 3-6-19

## 2019-06-10 DIAGNOSIS — M05.742 RHEUMATOID ARTHRITIS INVOLVING BOTH HANDS WITH POSITIVE RHEUMATOID FACTOR (HCC): ICD-10-CM

## 2019-06-10 DIAGNOSIS — M05.741 RHEUMATOID ARTHRITIS INVOLVING BOTH HANDS WITH POSITIVE RHEUMATOID FACTOR (HCC): ICD-10-CM

## 2019-06-10 DIAGNOSIS — M17.0 OSTEOARTHRITIS OF BOTH KNEES, UNSPECIFIED OSTEOARTHRITIS TYPE: ICD-10-CM

## 2019-06-10 RX ORDER — HYDROCODONE BITARTRATE AND ACETAMINOPHEN 5; 325 MG/1; MG/1
1 TABLET ORAL EVERY 6 HOURS PRN
Qty: 120 TABLET | Refills: 0 | OUTPATIENT
Start: 2019-06-10 | End: 2019-07-10

## 2019-06-10 RX ORDER — HYDROCODONE BITARTRATE AND ACETAMINOPHEN 5; 325 MG/1; MG/1
1 TABLET ORAL EVERY 6 HOURS PRN
Qty: 120 TABLET | Refills: 0 | Status: CANCELLED | OUTPATIENT
Start: 2019-06-10 | End: 2019-07-10

## 2019-06-10 NOTE — TELEPHONE ENCOUNTER
Norco  Last OV relevant to medication: 3-6-19  Last refill date: 5-5-19  #/refills: 0  When pt was asked to return for OV:6months  Upcoming appt/reason: none  Recent labs: none

## 2019-06-10 NOTE — TELEPHONE ENCOUNTER
Refill needed     HYDROcodone-acetaminophen (Loreto Pena) 5-325 MG Oral Tab     Call pt when ready for pickup

## 2019-06-13 RX ORDER — HYDROCODONE BITARTRATE AND ACETAMINOPHEN 5; 325 MG/1; MG/1
1 TABLET ORAL EVERY 6 HOURS PRN
Qty: 120 TABLET | Refills: 0 | Status: CANCELLED | OUTPATIENT
Start: 2019-06-13 | End: 2019-07-13

## 2019-06-13 RX ORDER — HYDROCODONE BITARTRATE AND ACETAMINOPHEN 5; 325 MG/1; MG/1
1 TABLET ORAL EVERY 6 HOURS PRN
Qty: 120 TABLET | Refills: 0 | Status: SHIPPED | OUTPATIENT
Start: 2019-06-13 | End: 2019-07-13

## 2019-06-13 RX ORDER — HYDROCODONE BITARTRATE AND ACETAMINOPHEN 5; 325 MG/1; MG/1
1 TABLET ORAL EVERY 6 HOURS PRN
Qty: 120 TABLET | Refills: 0 | Status: SHIPPED | OUTPATIENT
Start: 2019-07-14 | End: 2019-08-13

## 2019-06-13 RX ORDER — HYDROCODONE BITARTRATE AND ACETAMINOPHEN 5; 325 MG/1; MG/1
1 TABLET ORAL EVERY 6 HOURS PRN
Qty: 120 TABLET | Refills: 0 | Status: SHIPPED | OUTPATIENT
Start: 2019-08-14 | End: 2019-09-13

## 2019-06-13 NOTE — TELEPHONE ENCOUNTER
Pt keeps calling asking about status of refill.  Norco  Last OV relevant to medication: 3-6-19  Last refill date: 5-5-19  #/refills: 0  When pt was asked to return for OV:6months  Upcoming appt/reason: none  Recent labs: none

## 2019-07-07 DIAGNOSIS — I10 ESSENTIAL HYPERTENSION: ICD-10-CM

## 2019-07-08 RX ORDER — FELODIPINE 10 MG/1
TABLET, EXTENDED RELEASE ORAL
Qty: 90 TABLET | Refills: 0 | Status: SHIPPED | OUTPATIENT
Start: 2019-07-08 | End: 2019-09-19

## 2019-07-08 NOTE — TELEPHONE ENCOUNTER
Passed protocol     LAST REFILL:  3/6/2019 Felodipine ER 10 mg #90 NR    LOV:   3/6/2019 Nasim Montez RTC 6 months     2. Essential hypertension  Conservative measures dicussed. Continue medication. Diet and exercise explained and encouraged.   Fasting lab

## 2019-08-12 ENCOUNTER — ANTI-COAG (OUTPATIENT)
Dept: CARDIOLOGY | Age: 69
End: 2019-08-12

## 2019-08-12 DIAGNOSIS — I74.3 EMBOLISM AND THROMBOSIS OF ARTERIES OF LOWER EXTREMITY (CMD): Primary | ICD-10-CM

## 2019-08-12 DIAGNOSIS — I74.3 EMBOLISM AND THROMBOSIS OF ARTERIES OF LOWER EXTREMITY (CMD): ICD-10-CM

## 2019-08-14 ENCOUNTER — ANTI-COAG (OUTPATIENT)
Dept: CARDIOLOGY | Age: 69
End: 2019-08-14

## 2019-08-14 DIAGNOSIS — I74.3 EMBOLISM AND THROMBOSIS OF ARTERIES OF LOWER EXTREMITY (CMD): ICD-10-CM

## 2019-08-14 RX ORDER — WARFARIN SODIUM 5 MG/1
5 TABLET ORAL DAILY
Qty: 30 TABLET | Refills: 0 | Status: SHIPPED | OUTPATIENT
Start: 2019-08-14 | End: 2019-09-13

## 2019-09-18 ENCOUNTER — ANTI-COAG (OUTPATIENT)
Dept: CARDIOLOGY | Age: 69
End: 2019-09-18

## 2019-09-18 DIAGNOSIS — I74.3 EMBOLISM AND THROMBOSIS OF ARTERIES OF LOWER EXTREMITY (CMD): ICD-10-CM

## 2019-09-19 ENCOUNTER — APPOINTMENT (OUTPATIENT)
Dept: LAB | Age: 69
End: 2019-09-19
Attending: FAMILY MEDICINE
Payer: COMMERCIAL

## 2019-09-19 ENCOUNTER — OFFICE VISIT (OUTPATIENT)
Dept: INTERNAL MEDICINE CLINIC | Facility: CLINIC | Age: 69
End: 2019-09-19
Payer: COMMERCIAL

## 2019-09-19 VITALS
RESPIRATION RATE: 18 BRPM | WEIGHT: 272.5 LBS | HEIGHT: 62 IN | SYSTOLIC BLOOD PRESSURE: 128 MMHG | TEMPERATURE: 98 F | DIASTOLIC BLOOD PRESSURE: 78 MMHG | OXYGEN SATURATION: 96 % | BODY MASS INDEX: 50.14 KG/M2 | HEART RATE: 66 BPM

## 2019-09-19 DIAGNOSIS — F33.0 MILD EPISODE OF RECURRENT MAJOR DEPRESSIVE DISORDER (HCC): ICD-10-CM

## 2019-09-19 DIAGNOSIS — I74.3 EMBOLISM AND THROMBOSIS OF ARTERIES OF LOWER EXTREMITIES (HCC): ICD-10-CM

## 2019-09-19 DIAGNOSIS — E78.2 MIXED HYPERLIPIDEMIA: ICD-10-CM

## 2019-09-19 DIAGNOSIS — E11.9 DIABETES MELLITUS WITHOUT COMPLICATION (HCC): ICD-10-CM

## 2019-09-19 DIAGNOSIS — M05.741 RHEUMATOID ARTHRITIS INVOLVING BOTH HANDS WITH POSITIVE RHEUMATOID FACTOR (HCC): ICD-10-CM

## 2019-09-19 DIAGNOSIS — I10 ESSENTIAL HYPERTENSION: ICD-10-CM

## 2019-09-19 DIAGNOSIS — I10 ESSENTIAL HYPERTENSION: Primary | ICD-10-CM

## 2019-09-19 DIAGNOSIS — L30.4 INTERTRIGO: ICD-10-CM

## 2019-09-19 DIAGNOSIS — M19.071 OSTEOARTHRITIS OF RIGHT ANKLE, UNSPECIFIED OSTEOARTHRITIS TYPE: ICD-10-CM

## 2019-09-19 DIAGNOSIS — M05.742 RHEUMATOID ARTHRITIS INVOLVING BOTH HANDS WITH POSITIVE RHEUMATOID FACTOR (HCC): ICD-10-CM

## 2019-09-19 DIAGNOSIS — F32.A DEPRESSION, UNSPECIFIED DEPRESSION TYPE: ICD-10-CM

## 2019-09-19 LAB
ALBUMIN SERPL-MCNC: 3.7 G/DL
ALBUMIN SERPL-MCNC: 3.7 G/DL (ref 3.4–5)
ALBUMIN/GLOB SERPL: 0.9 {RATIO} (ref 1–2)
ALBUMIN/GLOB SERPL: NORMAL {RATIO}
ALP LIVER SERPL-CCNC: 78 U/L (ref 55–142)
ALP SERPL-CCNC: 78 U/L
ALT SERPL-CCNC: 36 U/L
ALT SERPL-CCNC: 36 U/L (ref 13–56)
ANION GAP SERPL CALC-SCNC: 6 MMOL/L (ref 0–18)
ANION GAP SERPL CALC-SCNC: NORMAL MMOL/L
AST SERPL-CCNC: 21 U/L
AST SERPL-CCNC: 21 U/L (ref 15–37)
BILIRUB SERPL-MCNC: 0.8 MG/DL
BILIRUB SERPL-MCNC: 0.9 MG/DL (ref 0.1–2)
BUN BLD-MCNC: 17 MG/DL (ref 7–18)
BUN SERPL-MCNC: 17 MG/DL
BUN/CREAT SERPL: 19.3 (ref 10–20)
BUN/CREAT SERPL: NORMAL
CALCIUM BLD-MCNC: 10 MG/DL (ref 8.5–10.1)
CALCIUM SERPL-MCNC: 10 MG/DL
CHLORIDE SERPL-SCNC: 99 MMOL/L
CHLORIDE SERPL-SCNC: 99 MMOL/L (ref 98–112)
CHOLEST SERPL-MCNC: 165 MG/DL
CHOLEST SMN-MCNC: 165 MG/DL (ref ?–200)
CHOLEST/HDLC SERPL: NORMAL {RATIO}
CO2 SERPL-SCNC: 30 MMOL/L (ref 21–32)
CO2 SERPL-SCNC: NORMAL MMOL/L
CREAT BLD-MCNC: 0.88 MG/DL (ref 0.55–1.02)
CREAT SERPL-MCNC: 0.88 MG/DL
CREAT UR-SCNC: 117 MG/DL
EST. AVERAGE GLUCOSE BLD GHB EST-MCNC: 148 MG/DL (ref 68–126)
EST. AVERAGE GLUCOSE BLD GHB EST-MCNC: NORMAL MG/DL
GLOBULIN PLAS-MCNC: 4 G/DL (ref 2.8–4.4)
GLOBULIN SER-MCNC: 4 G/DL
GLUCOSE BLD-MCNC: 125 MG/DL (ref 70–99)
GLUCOSE SERPL-MCNC: 125 MG/DL
HBA1C MFR BLD HPLC: 6.8 % (ref ?–5.7)
HBA1C MFR BLD: 6.8 %
HBA1C MFR BLD: NORMAL % (ref 4.5–5.6)
HDLC SERPL-MCNC: 54 MG/DL
HDLC SERPL-MCNC: 54 MG/DL (ref 40–59)
INR BLD: 1.93 (ref 0.9–1.1)
INR PPP: 1.93
LDLC SERPL CALC-MCNC: 80 MG/DL
LDLC SERPL CALC-MCNC: 80 MG/DL (ref ?–100)
LENGTH OF FAST TIME PATIENT: NORMAL H
LENGTH OF FAST TIME PATIENT: NORMAL H
M PROTEIN MFR SERPL ELPH: 7.7 G/DL (ref 6.4–8.2)
MICROALBUMIN UR-MCNC: 1.08 MG/DL
MICROALBUMIN/CREAT 24H UR-RTO: 9.2 UG/MG (ref ?–30)
NONHDLC SERPL-MCNC: 111 MG/DL (ref ?–130)
NONHDLC SERPL-MCNC: NORMAL MG/DL
OSMOLALITY SERPL CALC.SUM OF ELEC: 283 MOSM/KG (ref 275–295)
POTASSIUM SERPL-SCNC: 3.8 MMOL/L
POTASSIUM SERPL-SCNC: 3.8 MMOL/L (ref 3.5–5.1)
PROT SERPL-MCNC: 7.7 G/DL
PSA SERPL DL<=0.01 NG/ML-MCNC: 23.2 SECONDS (ref 12.5–14.7)
SODIUM SERPL-SCNC: 135 MMOL/L
SODIUM SERPL-SCNC: 135 MMOL/L (ref 136–145)
TRIGL SERPL-MCNC: 154 MG/DL
TRIGL SERPL-MCNC: 154 MG/DL (ref 30–149)
VLDLC SERPL CALC-MCNC: 31 MG/DL (ref 0–30)
VLDLC SERPL CALC-MCNC: NORMAL MG/DL

## 2019-09-19 PROCEDURE — 82570 ASSAY OF URINE CREATININE: CPT | Performed by: FAMILY MEDICINE

## 2019-09-19 PROCEDURE — 80061 LIPID PANEL: CPT | Performed by: FAMILY MEDICINE

## 2019-09-19 PROCEDURE — 80053 COMPREHEN METABOLIC PANEL: CPT | Performed by: FAMILY MEDICINE

## 2019-09-19 PROCEDURE — 83036 HEMOGLOBIN GLYCOSYLATED A1C: CPT | Performed by: FAMILY MEDICINE

## 2019-09-19 PROCEDURE — 82043 UR ALBUMIN QUANTITATIVE: CPT | Performed by: FAMILY MEDICINE

## 2019-09-19 PROCEDURE — 99214 OFFICE O/P EST MOD 30 MIN: CPT | Performed by: FAMILY MEDICINE

## 2019-09-19 PROCEDURE — 85610 PROTHROMBIN TIME: CPT | Performed by: FAMILY MEDICINE

## 2019-09-19 PROCEDURE — 36415 COLL VENOUS BLD VENIPUNCTURE: CPT | Performed by: FAMILY MEDICINE

## 2019-09-19 RX ORDER — HYDROCODONE BITARTRATE AND ACETAMINOPHEN 5; 325 MG/1; MG/1
1 TABLET ORAL EVERY 6 HOURS PRN
Qty: 120 TABLET | Refills: 0 | Status: SHIPPED | OUTPATIENT
Start: 2019-09-19 | End: 2019-10-19

## 2019-09-19 RX ORDER — HYDROCODONE BITARTRATE AND ACETAMINOPHEN 5; 325 MG/1; MG/1
1 TABLET ORAL EVERY 6 HOURS PRN
Qty: 120 TABLET | Refills: 0 | Status: SHIPPED | OUTPATIENT
Start: 2019-11-20 | End: 2019-12-20

## 2019-09-19 RX ORDER — ATORVASTATIN CALCIUM 10 MG/1
10 TABLET, FILM COATED ORAL DAILY
Qty: 90 TABLET | Refills: 0 | Status: SHIPPED | OUTPATIENT
Start: 2019-09-19 | End: 2019-12-15

## 2019-09-19 RX ORDER — TRIAMTERENE AND HYDROCHLOROTHIAZIDE 37.5; 25 MG/1; MG/1
1 TABLET ORAL DAILY
Qty: 90 TABLET | Refills: 0 | Status: SHIPPED | OUTPATIENT
Start: 2019-09-19 | End: 2019-12-15

## 2019-09-19 RX ORDER — HYDROCODONE BITARTRATE AND ACETAMINOPHEN 5; 325 MG/1; MG/1
1 TABLET ORAL EVERY 6 HOURS PRN
COMMUNITY
End: 2020-04-17

## 2019-09-19 RX ORDER — NYSTATIN 100000 [USP'U]/G
1 POWDER TOPICAL 4 TIMES DAILY
Qty: 60 G | Refills: 1 | Status: SHIPPED | OUTPATIENT
Start: 2019-09-19 | End: 2021-06-14

## 2019-09-19 RX ORDER — FELODIPINE 10 MG/1
10 TABLET, EXTENDED RELEASE ORAL DAILY
Qty: 90 TABLET | Refills: 0 | Status: SHIPPED | OUTPATIENT
Start: 2019-09-19 | End: 2019-12-15

## 2019-09-19 RX ORDER — HYDROCODONE BITARTRATE AND ACETAMINOPHEN 5; 325 MG/1; MG/1
1 TABLET ORAL EVERY 6 HOURS PRN
Qty: 120 TABLET | Refills: 0 | Status: SHIPPED | OUTPATIENT
Start: 2019-10-20 | End: 2019-11-19

## 2019-09-19 RX ORDER — FLUOXETINE HYDROCHLORIDE 40 MG/1
40 CAPSULE ORAL DAILY
Qty: 90 CAPSULE | Refills: 0 | Status: SHIPPED | OUTPATIENT
Start: 2019-09-19 | End: 2019-12-15

## 2019-09-19 NOTE — PROGRESS NOTES
HPI:   Dylan Hoyos is a 81year old female who presents for recheck of her diabetes. Patient’s FBS have been 137-141. Pt tests her blood 2 times daily. Last visit with ophthalmologist was 13 months ago. Knows she is due.    Pt has been checking her of total Hgb)   Date Value   03/02/2017 6.6 (H)   08/11/2015 6.3 (H)   08/20/2014 5.8 (H)     HgbA1C (%)   Date Value   03/06/2019 6.6 (H)   09/12/2018 7.2 (H)   06/11/2018 7.7 (H)     Cholesterol, Total (mg/dL)   Date Value   03/06/2019 173   09/12/2018 1 HYDROcodone-acetaminophen (NORCO) 5-325 MG Oral Tab Take 1 tablet by mouth every 6 (six) hours as needed for Pain.  Disp: 120 tablet Rfl: 0   [START ON 10/20/2019] HYDROcodone-acetaminophen (NORCO) 5-325 MG Oral Tab Take 1 tablet by mouth every 6 (six) ho Smokeless tobacco: Never Used    Alcohol use: No      Frequency: Never    Drug use: No    Exercise: none  Diet:does not watch      REVIEW OF SYSTEMS:   Constitutional: Negative for fatigue and unexpected weight change. HENT: Negative for dental problem. hypertension  Conservative measures dicussed. Continue medication. Diet and exercise explained and encouraged. Fasting labs due. Home blood pressure monitoring. Pt should measure BP’s two to three times per week. Goal blood pressure at home - < 135/85. [id number] UNIT/GM External Powder; Apply 1 Application topically 4 (four) times daily. Dispense: 60 g; Refill: 1    9. 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 Check pt/inr.  Send result to Dr. Harry Knutson ( every 6 (six) hours as needed for Pain. • HYDROcodone-acetaminophen (NORCO) 5-325 MG Oral Tab 120 tablet 0     Sig: Take 1 tablet by mouth every 6 (six) hours as needed for Pain.        Imaging & Consults:  None    The patient indicates understanding of t

## 2019-09-26 ENCOUNTER — TELEPHONE (OUTPATIENT)
Dept: CARDIOLOGY | Age: 69
End: 2019-09-26

## 2019-09-26 ENCOUNTER — ANTI-COAG (OUTPATIENT)
Dept: CARDIOLOGY | Age: 69
End: 2019-09-26

## 2019-09-26 DIAGNOSIS — I74.3 EMBOLISM AND THROMBOSIS OF ARTERIES OF LOWER EXTREMITY (CMD): ICD-10-CM

## 2019-09-26 RX ORDER — WARFARIN SODIUM 5 MG/1
2.5-5 TABLET ORAL DAILY
Qty: 30 TABLET | Refills: 0 | Status: SHIPPED | OUTPATIENT
Start: 2019-09-26 | End: 2019-10-24 | Stop reason: SDUPTHER

## 2019-09-26 RX ORDER — WARFARIN SODIUM 5 MG/1
5 TABLET ORAL DAILY
Qty: 30 TABLET | Refills: 0 | OUTPATIENT
Start: 2019-09-26 | End: 2019-10-26

## 2019-10-17 ENCOUNTER — TELEPHONE (OUTPATIENT)
Dept: INTERNAL MEDICINE CLINIC | Facility: CLINIC | Age: 69
End: 2019-10-17

## 2019-10-17 ENCOUNTER — ANTI-COAG (OUTPATIENT)
Dept: CARDIOLOGY | Age: 69
End: 2019-10-17

## 2019-10-17 DIAGNOSIS — I74.3 EMBOLISM AND THROMBOSIS OF ARTERIES OF LOWER EXTREMITY (CMD): ICD-10-CM

## 2019-10-17 NOTE — TELEPHONE ENCOUNTER
Patient called in, requesting a prescription to treat her UTI. Patient will going out of town next Tuesday.       Pharmacy:   [facility] [address] AT [address] [facility], [phone], [phone]

## 2019-10-17 NOTE — TELEPHONE ENCOUNTER
Advised pt needs appt in office or wic ,Pt does not  have a car  today and going on vacation next Tuesday . Pt has been exp since last night pain with urination and urinary frequency .     Pt req SM to be asked for abx without appt

## 2019-10-18 ENCOUNTER — HOSPITAL ENCOUNTER (OUTPATIENT)
Age: 69
Discharge: HOME | End: 2019-10-18
Attending: EMERGENCY MEDICINE
Payer: COMMERCIAL

## 2019-10-18 VITALS
RESPIRATION RATE: 20 BRPM | HEART RATE: 66 BPM | TEMPERATURE: 99 F | SYSTOLIC BLOOD PRESSURE: 173 MMHG | OXYGEN SATURATION: 96 % | DIASTOLIC BLOOD PRESSURE: 61 MMHG

## 2019-10-18 DIAGNOSIS — N30.00 ACUTE CYSTITIS WITHOUT HEMATURIA: Primary | ICD-10-CM

## 2019-10-18 PROCEDURE — 87086 URINE CULTURE/COLONY COUNT: CPT | Performed by: EMERGENCY MEDICINE

## 2019-10-18 PROCEDURE — 81002 URINALYSIS NONAUTO W/O SCOPE: CPT

## 2019-10-18 PROCEDURE — 99204 OFFICE O/P NEW MOD 45 MIN: CPT

## 2019-10-18 PROCEDURE — 99214 OFFICE O/P EST MOD 30 MIN: CPT

## 2019-10-18 RX ORDER — NITROFURANTOIN 25; 75 MG/1; MG/1
100 CAPSULE ORAL 2 TIMES DAILY
Qty: 20 CAPSULE | Refills: 0 | Status: SHIPPED | OUTPATIENT
Start: 2019-10-18 | End: 2019-10-28

## 2019-10-18 NOTE — ED PROVIDER NOTES
[facility]      History   Patient presents with:  Urinary Symptoms (urologic)    Stated Complaint: UTI SYMPTOMS    HPI    The patient is a 46-year-old female with past history of recurrent DVT, on Coumadi acute distress  Head: Normocephalic, no swelling or tenderness  Eyes: Nonicteric sclera, no conjunctival injection  ENT: TMs are clear and flat bilaterally.   There is no posterior pharyngeal erythema  Chest: Clear to auscultation, no tenderness  Cardiovasc

## 2019-10-18 NOTE — ED INITIAL ASSESSMENT (HPI)
PATIENT ARRIVED AMBULATORY TO ROOM C/O SYMPTOMS OF A UTI THAT STARTED 3 DAYS AGO. +URINARY FREQUENCY. +URGENCY. NO FEVERS. +BURNING WITH URINATION.

## 2019-10-24 DIAGNOSIS — I74.3 EMBOLISM AND THROMBOSIS OF ARTERIES OF LOWER EXTREMITY (CMD): ICD-10-CM

## 2019-10-24 RX ORDER — WARFARIN SODIUM 5 MG/1
TABLET ORAL
Qty: 30 TABLET | Refills: 0 | Status: SHIPPED | OUTPATIENT
Start: 2019-10-24 | End: 2019-11-21 | Stop reason: SDUPTHER

## 2019-11-21 DIAGNOSIS — I74.3 EMBOLISM AND THROMBOSIS OF ARTERIES OF LOWER EXTREMITY (CMD): ICD-10-CM

## 2019-12-04 ENCOUNTER — NURSE ONLY (OUTPATIENT)
Dept: ENDOCRINOLOGY CLINIC | Facility: CLINIC | Age: 69
End: 2019-12-04
Payer: COMMERCIAL

## 2019-12-04 ENCOUNTER — ANTI-COAG (OUTPATIENT)
Dept: CARDIOLOGY | Age: 69
End: 2019-12-04

## 2019-12-04 VITALS — WEIGHT: 271 LBS | HEIGHT: 62 IN | BODY MASS INDEX: 49.87 KG/M2

## 2019-12-04 DIAGNOSIS — I74.3 EMBOLISM AND THROMBOSIS OF ARTERIES OF LOWER EXTREMITY (CMD): ICD-10-CM

## 2019-12-04 DIAGNOSIS — E11.65 TYPE 2 DIABETES MELLITUS WITH HYPERGLYCEMIA, WITHOUT LONG-TERM CURRENT USE OF INSULIN (HCC): Primary | ICD-10-CM

## 2019-12-04 LAB — INR PPP: 2.4

## 2019-12-04 PROCEDURE — G0108 DIAB MANAGE TRN  PER INDIV: HCPCS

## 2019-12-04 RX ORDER — WARFARIN SODIUM 5 MG/1
TABLET ORAL
Qty: 14 TABLET | Refills: 0 | Status: SHIPPED | OUTPATIENT
Start: 2019-12-04 | End: 2020-02-14 | Stop reason: SDUPTHER

## 2019-12-04 RX ORDER — WARFARIN SODIUM 5 MG/1
2.5-5 TABLET ORAL DAILY
Qty: 22 TABLET | Refills: 0 | Status: SHIPPED | OUTPATIENT
Start: 2019-12-04 | End: 2020-02-17 | Stop reason: SDUPTHER

## 2019-12-04 NOTE — PROGRESS NOTES
Unk Pagoda  : 1950 was seen for Diabetic Education Follow up:    Date: 2019  Referring Provider: Marti Bernal  Start time: 11:30am End time: 12:30pm    Assessment:     Assessment: Ht 62\"   Wt 271 lb (122.9 kg)   BMI 49.57 kg/m²      HEMOGL fiber, reduced sodium)    Being Active  Benefits and effect of activity on BG discussed. Reviewed when to call MD and benefits of goal setting.       Monitoring  Reinforced glucose monitoring schedules: as she has been foin  BG today: 132 mg/dL fasting

## 2019-12-15 DIAGNOSIS — F32.A DEPRESSION, UNSPECIFIED DEPRESSION TYPE: ICD-10-CM

## 2019-12-15 DIAGNOSIS — E11.9 DIABETES MELLITUS WITHOUT COMPLICATION (HCC): ICD-10-CM

## 2019-12-15 DIAGNOSIS — E11.65 TYPE 2 DIABETES MELLITUS WITH HYPERGLYCEMIA, WITHOUT LONG-TERM CURRENT USE OF INSULIN (HCC): ICD-10-CM

## 2019-12-15 DIAGNOSIS — E78.2 MIXED HYPERLIPIDEMIA: ICD-10-CM

## 2019-12-15 DIAGNOSIS — I10 ESSENTIAL HYPERTENSION: ICD-10-CM

## 2019-12-16 RX ORDER — FELODIPINE 10 MG/1
TABLET, EXTENDED RELEASE ORAL
Qty: 90 TABLET | Refills: 0 | Status: SHIPPED | OUTPATIENT
Start: 2019-12-16 | End: 2020-03-17

## 2019-12-16 RX ORDER — HYDROCODONE BITARTRATE AND ACETAMINOPHEN 5; 325 MG/1; MG/1
1 TABLET ORAL EVERY 6 HOURS PRN
Qty: 120 TABLET | Refills: 0 | Status: SHIPPED | OUTPATIENT
Start: 2019-12-16 | End: 2020-01-15

## 2019-12-16 RX ORDER — ATORVASTATIN CALCIUM 10 MG/1
TABLET, FILM COATED ORAL
Qty: 90 TABLET | Refills: 0 | Status: SHIPPED | OUTPATIENT
Start: 2019-12-16 | End: 2020-03-17

## 2019-12-16 RX ORDER — HYDROCODONE BITARTRATE AND ACETAMINOPHEN 5; 325 MG/1; MG/1
1 TABLET ORAL EVERY 6 HOURS PRN
Qty: 120 TABLET | Refills: 2 | Status: CANCELLED | OUTPATIENT
Start: 2019-12-16

## 2019-12-16 RX ORDER — BLOOD SUGAR DIAGNOSTIC
STRIP MISCELLANEOUS
Qty: 100 STRIP | Refills: 0 | Status: SHIPPED | OUTPATIENT
Start: 2019-12-16 | End: 2020-03-17

## 2019-12-16 RX ORDER — FLUOXETINE HYDROCHLORIDE 40 MG/1
CAPSULE ORAL
Qty: 90 CAPSULE | Refills: 0 | Status: SHIPPED | OUTPATIENT
Start: 2019-12-16 | End: 2020-03-17

## 2019-12-16 RX ORDER — TRIAMTERENE AND HYDROCHLOROTHIAZIDE 37.5; 25 MG/1; MG/1
1 TABLET ORAL DAILY
Qty: 90 TABLET | Refills: 0 | Status: SHIPPED | OUTPATIENT
Start: 2019-12-16 | End: 2020-03-17

## 2019-12-16 RX ORDER — HYDROCODONE BITARTRATE AND ACETAMINOPHEN 5; 325 MG/1; MG/1
1 TABLET ORAL EVERY 6 HOURS PRN
Qty: 120 TABLET | Refills: 0 | Status: SHIPPED | OUTPATIENT
Start: 2020-02-16 | End: 2020-03-17

## 2019-12-16 RX ORDER — LANCETS
EACH MISCELLANEOUS
Qty: 100 EACH | Refills: 0 | Status: SHIPPED | OUTPATIENT
Start: 2019-12-16 | End: 2020-03-17

## 2019-12-16 RX ORDER — HYDROCODONE BITARTRATE AND ACETAMINOPHEN 5; 325 MG/1; MG/1
1 TABLET ORAL EVERY 6 HOURS PRN
Qty: 120 TABLET | Refills: 0 | Status: SHIPPED | OUTPATIENT
Start: 2020-01-16 | End: 2020-02-15

## 2019-12-16 NOTE — TELEPHONE ENCOUNTER
Fluoxetine HCL 40 mg  Last OV relevant to medication: 9-19-19  Last refill date: 9-19-19 #/refills: 0   When pt was asked to return for OV: 3-6 mo.   Upcoming appt/reason:  none  Recent labs: none    Felodipine ER 10 Mg  Last OV relevant to medication: 9-19

## 2019-12-16 NOTE — TELEPHONE ENCOUNTER
Pt is requesting 3 mo. Supply    Norco 5-325 mg  Last OV relevant to medication: 9-19-19  Last refill date: 11-20-19 #/refills: 0  When pt was asked to return for OV: 3-6 mo.   Upcoming appt/reason: none  Recent labs: none

## 2019-12-16 NOTE — TELEPHONE ENCOUNTER
Patient called in to request refill of: [facility] 5-325 MG Oral     Patient requested to  3 month RX from office tomorrow 12/17/19. Advised patient could not guarantee RX would be ready by tomorrow.  Patient verbalized underst

## 2020-01-29 ENCOUNTER — ANTI-COAG (OUTPATIENT)
Dept: CARDIOLOGY | Age: 70
End: 2020-01-29

## 2020-01-29 DIAGNOSIS — I74.3 EMBOLISM AND THROMBOSIS OF ARTERIES OF LOWER EXTREMITY (CMD): ICD-10-CM

## 2020-01-29 DIAGNOSIS — I74.3 EMBOLISM AND THROMBOSIS OF ARTERIES OF LOWER EXTREMITY (CMD): Primary | ICD-10-CM

## 2020-01-29 LAB — INR PPP: 2.1

## 2020-01-29 RX ORDER — WARFARIN SODIUM 5 MG/1
2.5-5 TABLET ORAL DAILY
Qty: 30 TABLET | Refills: 0 | Status: SHIPPED | OUTPATIENT
Start: 2020-01-29 | End: 2020-02-14 | Stop reason: SDUPTHER

## 2020-01-30 ENCOUNTER — CLINICAL ABSTRACT (OUTPATIENT)
Dept: CARDIOLOGY | Age: 70
End: 2020-01-30

## 2020-02-14 RX ORDER — ATORVASTATIN CALCIUM 10 MG/1
10 TABLET, FILM COATED ORAL DAILY
COMMUNITY
Start: 2013-07-31 | End: 2023-08-07 | Stop reason: DRUGHIGH

## 2020-02-14 RX ORDER — PERPHENAZINE 16 MG/1
TABLET, FILM COATED ORAL 2 TIMES DAILY
Refills: 0 | COMMUNITY
Start: 2019-12-16

## 2020-02-14 RX ORDER — FLUOXETINE HYDROCHLORIDE 40 MG/1
40 CAPSULE ORAL DAILY
Refills: 0 | COMMUNITY
Start: 2019-12-16 | End: 2020-02-17 | Stop reason: SDUPTHER

## 2020-02-14 RX ORDER — FELODIPINE 10 MG/1
TABLET, EXTENDED RELEASE ORAL DAILY
Refills: 0 | COMMUNITY
Start: 2019-12-16

## 2020-02-14 RX ORDER — LANCETS
EACH MISCELLANEOUS 2 TIMES DAILY
Refills: 0 | COMMUNITY
Start: 2019-12-16

## 2020-02-14 RX ORDER — HYDROCODONE BITARTRATE AND ACETAMINOPHEN 5; 325 MG/1; MG/1
TABLET ORAL PRN
Refills: 0 | COMMUNITY
Start: 2020-01-15

## 2020-02-14 RX ORDER — TRIAMTERENE AND HYDROCHLOROTHIAZIDE 37.5; 25 MG/1; MG/1
TABLET ORAL DAILY
Refills: 0 | COMMUNITY
Start: 2019-12-16

## 2020-02-14 RX ORDER — VITAMIN B COMPLEX
2000 TABLET ORAL DAILY
COMMUNITY
Start: 2016-08-15

## 2020-02-17 ENCOUNTER — OFFICE VISIT (OUTPATIENT)
Dept: CARDIOLOGY | Age: 70
End: 2020-02-17

## 2020-02-17 VITALS
HEIGHT: 63 IN | WEIGHT: 267 LBS | RESPIRATION RATE: 16 BRPM | HEART RATE: 96 BPM | DIASTOLIC BLOOD PRESSURE: 78 MMHG | BODY MASS INDEX: 47.31 KG/M2 | SYSTOLIC BLOOD PRESSURE: 154 MMHG

## 2020-02-17 DIAGNOSIS — I70.209 ATHEROSCLEROSIS OF ARTERIES OF EXTREMITIES (CMD): ICD-10-CM

## 2020-02-17 DIAGNOSIS — I73.9 PAD (PERIPHERAL ARTERY DISEASE) (CMD): ICD-10-CM

## 2020-02-17 DIAGNOSIS — Z79.01 LONG TERM (CURRENT) USE OF ANTICOAGULANTS: ICD-10-CM

## 2020-02-17 DIAGNOSIS — E78.00 PURE HYPERCHOLESTEROLEMIA: ICD-10-CM

## 2020-02-17 DIAGNOSIS — I74.3 EMBOLISM AND THROMBOSIS OF ARTERIES OF LOWER EXTREMITY (CMD): Primary | ICD-10-CM

## 2020-02-17 DIAGNOSIS — I74.3 EMBOLISM AND THROMBOSIS OF ARTERIES OF LOWER EXTREMITY (CMD): ICD-10-CM

## 2020-02-17 DIAGNOSIS — G47.33 OSA (OBSTRUCTIVE SLEEP APNEA): ICD-10-CM

## 2020-02-17 PROBLEM — I82.409 DVT (DEEP VENOUS THROMBOSIS) (CMD): Status: ACTIVE | Noted: 2020-02-17

## 2020-02-17 PROCEDURE — 3077F SYST BP >= 140 MM HG: CPT | Performed by: INTERNAL MEDICINE

## 2020-02-17 PROCEDURE — 99215 OFFICE O/P EST HI 40 MIN: CPT | Performed by: INTERNAL MEDICINE

## 2020-02-17 PROCEDURE — 3078F DIAST BP <80 MM HG: CPT | Performed by: INTERNAL MEDICINE

## 2020-02-17 RX ORDER — WARFARIN SODIUM 5 MG/1
2.5-5 TABLET ORAL DAILY
Qty: 90 TABLET | Refills: 1 | Status: SHIPPED | OUTPATIENT
Start: 2020-02-17 | End: 2020-10-29 | Stop reason: SDUPTHER

## 2020-02-17 RX ORDER — FLASH GLUCOSE SENSOR
1 KIT MISCELLANEOUS
COMMUNITY
Start: 2018-11-14

## 2020-02-17 RX ORDER — FLUOXETINE HYDROCHLORIDE 40 MG/1
40 CAPSULE ORAL DAILY
COMMUNITY

## 2020-02-17 RX ORDER — NYSTATIN 100000 [USP'U]/G
POWDER TOPICAL 4 TIMES DAILY PRN
COMMUNITY
Start: 2019-09-19

## 2020-02-17 SDOH — HEALTH STABILITY: MENTAL HEALTH: HOW OFTEN DO YOU HAVE A DRINK CONTAINING ALCOHOL?: 2-4 TIMES A MONTH

## 2020-02-17 ASSESSMENT — ENCOUNTER SYMPTOMS
HEMOPTYSIS: 0
FEVER: 0
BRUISES/BLEEDS EASILY: 0
WEIGHT LOSS: 0
SUSPICIOUS LESIONS: 0
ALLERGIC/IMMUNOLOGIC COMMENTS: NO NEW FOOD ALLERGIES
WEIGHT GAIN: 0
COUGH: 0
HEMATOCHEZIA: 0
CHILLS: 0

## 2020-02-17 ASSESSMENT — PATIENT HEALTH QUESTIONNAIRE - PHQ9
1. LITTLE INTEREST OR PLEASURE IN DOING THINGS: NOT AT ALL
2. FEELING DOWN, DEPRESSED OR HOPELESS: NOT AT ALL
SUM OF ALL RESPONSES TO PHQ9 QUESTIONS 1 AND 2: 0
SUM OF ALL RESPONSES TO PHQ9 QUESTIONS 1 AND 2: 0

## 2020-03-11 ENCOUNTER — NURSE ONLY (OUTPATIENT)
Dept: ENDOCRINOLOGY CLINIC | Facility: CLINIC | Age: 70
End: 2020-03-11
Payer: COMMERCIAL

## 2020-03-11 VITALS — BODY MASS INDEX: 50.24 KG/M2 | WEIGHT: 273 LBS | HEIGHT: 62 IN

## 2020-03-11 DIAGNOSIS — E11.65 TYPE 2 DIABETES MELLITUS WITH HYPERGLYCEMIA, WITHOUT LONG-TERM CURRENT USE OF INSULIN (HCC): Primary | ICD-10-CM

## 2020-03-11 LAB
CARTRIDGE LOT#: 588 NUMERIC
HEMOGLOBIN A1C: 6.1 % (ref 4.3–5.6)

## 2020-03-11 PROCEDURE — 83036 HEMOGLOBIN GLYCOSYLATED A1C: CPT

## 2020-03-11 PROCEDURE — G0108 DIAB MANAGE TRN  PER INDIV: HCPCS

## 2020-03-11 NOTE — PROGRESS NOTES
Macy Christina  : 1950 was seen for Diabetic Education Follow up:    Date: 3/11/2020  Start time: 7am End time: 7:30am    Assessment:     Assessment: Ht 62\"   Wt 273 lb (123.8 kg)   BMI 49.93 kg/m²      HEMOGLOBIN A1C (%)   Date Value   2020 discussed. Health Coping  Family involvement/support encouraged  Depression and diabetes reviewed. Recommendations:      1. Follow recommended meal plan. 2. Test BG fasting and 2 hours post meals 2 times/day.    3. Bring glucose logs to all provider

## 2020-03-17 DIAGNOSIS — I10 ESSENTIAL HYPERTENSION: ICD-10-CM

## 2020-03-17 DIAGNOSIS — E11.9 DIABETES MELLITUS WITHOUT COMPLICATION (HCC): ICD-10-CM

## 2020-03-17 DIAGNOSIS — F32.A DEPRESSION, UNSPECIFIED DEPRESSION TYPE: ICD-10-CM

## 2020-03-17 DIAGNOSIS — E11.65 TYPE 2 DIABETES MELLITUS WITH HYPERGLYCEMIA, WITHOUT LONG-TERM CURRENT USE OF INSULIN (HCC): ICD-10-CM

## 2020-03-17 DIAGNOSIS — E78.2 MIXED HYPERLIPIDEMIA: ICD-10-CM

## 2020-03-17 RX ORDER — HYDROCODONE BITARTRATE AND ACETAMINOPHEN 5; 325 MG/1; MG/1
1 TABLET ORAL EVERY 6 HOURS PRN
Qty: 120 TABLET | Refills: 0 | Status: SHIPPED | OUTPATIENT
Start: 2020-03-17 | End: 2020-04-16

## 2020-03-17 RX ORDER — TRIAMTERENE AND HYDROCHLOROTHIAZIDE 37.5; 25 MG/1; MG/1
1 TABLET ORAL DAILY
Qty: 90 TABLET | Refills: 0 | Status: SHIPPED | OUTPATIENT
Start: 2020-03-17 | End: 2020-06-18

## 2020-03-17 RX ORDER — BLOOD SUGAR DIAGNOSTIC
STRIP MISCELLANEOUS
Qty: 100 STRIP | Refills: 0 | Status: SHIPPED | OUTPATIENT
Start: 2020-03-17 | End: 2020-09-08

## 2020-03-17 RX ORDER — ATORVASTATIN CALCIUM 10 MG/1
TABLET, FILM COATED ORAL
Qty: 90 TABLET | Refills: 0 | Status: SHIPPED | OUTPATIENT
Start: 2020-03-17 | End: 2020-06-18

## 2020-03-17 RX ORDER — LANCETS
EACH MISCELLANEOUS
Qty: 100 EACH | Refills: 0 | Status: SHIPPED | OUTPATIENT
Start: 2020-03-17 | End: 2020-09-08

## 2020-03-17 RX ORDER — FLUOXETINE HYDROCHLORIDE 40 MG/1
CAPSULE ORAL
Qty: 90 CAPSULE | Refills: 0 | Status: SHIPPED | OUTPATIENT
Start: 2020-03-17 | End: 2020-06-18

## 2020-03-17 RX ORDER — FELODIPINE 10 MG/1
TABLET, EXTENDED RELEASE ORAL
Qty: 90 TABLET | Refills: 0 | Status: SHIPPED | OUTPATIENT
Start: 2020-03-17 | End: 2020-06-18

## 2020-03-17 NOTE — TELEPHONE ENCOUNTER
Upcoming appt. 5-4-20, rescheduled appt.       norco  Last OV relevant to medication: 9-19-19  Last refill date: 2-16-20 #/refills: 0  When pt was asked to return for OV: 6 mo.   Upcoming appt/reason: 5-4-20  Recent labs: none    Felodipine ER 10 mg  Last O

## 2020-04-17 RX ORDER — HYDROCODONE BITARTRATE AND ACETAMINOPHEN 5; 325 MG/1; MG/1
1 TABLET ORAL EVERY 6 HOURS PRN
Qty: 120 TABLET | Refills: 0 | Status: SHIPPED | OUTPATIENT
Start: 2020-04-17 | End: 2020-05-19

## 2020-04-17 NOTE — TELEPHONE ENCOUNTER
Last OV relevant to medication: 9/19/19  Last refill date: 3/17/20 #120  When pt was asked to return for OV: 3-6 months  Upcoming appt/reason:   Future Appointments   Date Time Provider Amelia Ng   5/4/2020  8:30 AM IVETTE Eddy EMG 8 EMG Rissa Anand

## 2020-04-17 NOTE — TELEPHONE ENCOUNTER
HYDROcodone-acetaminophen 5-325 MG   [facility] #[id number] - [place], [address] AT [address] [facility], [phone], [phone]

## 2020-05-19 ENCOUNTER — TELEPHONE (OUTPATIENT)
Dept: INTERNAL MEDICINE CLINIC | Facility: CLINIC | Age: 70
End: 2020-05-19

## 2020-05-19 RX ORDER — HYDROCODONE BITARTRATE AND ACETAMINOPHEN 5; 325 MG/1; MG/1
1 TABLET ORAL EVERY 6 HOURS PRN
Qty: 120 TABLET | Refills: 0 | Status: SHIPPED | OUTPATIENT
Start: 2020-05-19 | End: 2020-06-18

## 2020-05-19 NOTE — TELEPHONE ENCOUNTER
HYDROcodone-acetaminophen 5-325 MG Oral Tab   Patient calling for refill   [facility]  #[id number] - [address], [address] [place] [address] [address] [place] [address], [phone], [phone]

## 2020-06-18 DIAGNOSIS — I10 ESSENTIAL HYPERTENSION: ICD-10-CM

## 2020-06-18 DIAGNOSIS — E11.9 DIABETES MELLITUS WITHOUT COMPLICATION (HCC): ICD-10-CM

## 2020-06-18 DIAGNOSIS — F32.A DEPRESSION, UNSPECIFIED DEPRESSION TYPE: ICD-10-CM

## 2020-06-18 DIAGNOSIS — E78.2 MIXED HYPERLIPIDEMIA: ICD-10-CM

## 2020-06-18 RX ORDER — ATORVASTATIN CALCIUM 10 MG/1
TABLET, FILM COATED ORAL
Qty: 90 TABLET | Refills: 0 | Status: SHIPPED | OUTPATIENT
Start: 2020-06-18 | End: 2020-10-05

## 2020-06-18 RX ORDER — HYDROCODONE BITARTRATE AND ACETAMINOPHEN 5; 325 MG/1; MG/1
1 TABLET ORAL EVERY 6 HOURS PRN
Qty: 120 TABLET | Refills: 0 | Status: SHIPPED | OUTPATIENT
Start: 2020-06-18 | End: 2020-07-23

## 2020-06-18 RX ORDER — FELODIPINE 10 MG/1
TABLET, EXTENDED RELEASE ORAL
Qty: 90 TABLET | Refills: 0 | Status: SHIPPED | OUTPATIENT
Start: 2020-06-18 | End: 2020-10-05

## 2020-06-18 RX ORDER — TRIAMTERENE AND HYDROCHLOROTHIAZIDE 37.5; 25 MG/1; MG/1
1 TABLET ORAL DAILY
Qty: 90 TABLET | Refills: 0 | Status: SHIPPED | OUTPATIENT
Start: 2020-06-18 | End: 2020-10-05

## 2020-06-18 RX ORDER — FLUOXETINE HYDROCHLORIDE 40 MG/1
CAPSULE ORAL
Qty: 90 CAPSULE | Refills: 0 | Status: SHIPPED | OUTPATIENT
Start: 2020-06-18 | End: 2020-09-08

## 2020-06-18 NOTE — TELEPHONE ENCOUNTER
Hydrocodone-acetaminophen  Last OV relevant to medication: 9-19-19  Last refill date: 5-19-20 #/refills: 0  When pt was asked to return for OV: 6 mo.   Upcoming appt/reason: 6-25-20  Recent labs: none

## 2020-06-18 NOTE — TELEPHONE ENCOUNTER
Patient called requesting refill for: HYDROcodone-acetaminophen 5-325 MG Oral Tab    [facility], [address] AT [address] [facility], [phone], [phone]

## 2020-06-18 NOTE — TELEPHONE ENCOUNTER
Is it okay to fill for a 3 month supply? Triamterene-hctz 37.5-25 mg  Last OV relevant to medication: 9-19-19  Last refill date: 3-17-20 #/refills: 0  When pt was asked to return for OV: 6 mo.   Upcoming appt/reason: 6-25-20  Recent labs: 9-19-19: CMP

## 2020-06-25 ENCOUNTER — OFFICE VISIT (OUTPATIENT)
Dept: INTERNAL MEDICINE CLINIC | Facility: CLINIC | Age: 70
End: 2020-06-25
Payer: COMMERCIAL

## 2020-06-25 ENCOUNTER — APPOINTMENT (OUTPATIENT)
Dept: LAB | Age: 70
End: 2020-06-25
Attending: FAMILY MEDICINE
Payer: COMMERCIAL

## 2020-06-25 VITALS
RESPIRATION RATE: 16 BRPM | BODY MASS INDEX: 50.05 KG/M2 | DIASTOLIC BLOOD PRESSURE: 82 MMHG | WEIGHT: 272 LBS | HEIGHT: 62 IN | TEMPERATURE: 98 F | SYSTOLIC BLOOD PRESSURE: 132 MMHG | OXYGEN SATURATION: 97 % | HEART RATE: 98 BPM

## 2020-06-25 DIAGNOSIS — M05.741 RHEUMATOID ARTHRITIS INVOLVING BOTH HANDS WITH POSITIVE RHEUMATOID FACTOR (HCC): ICD-10-CM

## 2020-06-25 DIAGNOSIS — M17.0 OSTEOARTHRITIS OF BOTH KNEES, UNSPECIFIED OSTEOARTHRITIS TYPE: ICD-10-CM

## 2020-06-25 DIAGNOSIS — F33.0 MILD EPISODE OF RECURRENT MAJOR DEPRESSIVE DISORDER (HCC): ICD-10-CM

## 2020-06-25 DIAGNOSIS — I10 ESSENTIAL HYPERTENSION: ICD-10-CM

## 2020-06-25 DIAGNOSIS — E28.39 ESTROGEN DEFICIENCY: ICD-10-CM

## 2020-06-25 DIAGNOSIS — E11.65 TYPE 2 DIABETES MELLITUS WITH HYPERGLYCEMIA, WITHOUT LONG-TERM CURRENT USE OF INSULIN (HCC): ICD-10-CM

## 2020-06-25 DIAGNOSIS — I74.3 EMBOLISM AND THROMBOSIS OF ARTERIES OF LOWER EXTREMITIES (HCC): ICD-10-CM

## 2020-06-25 DIAGNOSIS — E11.65 TYPE 2 DIABETES MELLITUS WITH HYPERGLYCEMIA, WITHOUT LONG-TERM CURRENT USE OF INSULIN (HCC): Primary | ICD-10-CM

## 2020-06-25 DIAGNOSIS — E78.2 MIXED HYPERLIPIDEMIA: ICD-10-CM

## 2020-06-25 DIAGNOSIS — Z23 NEED FOR PNEUMOCOCCAL VACCINATION: ICD-10-CM

## 2020-06-25 DIAGNOSIS — Z12.31 ENCOUNTER FOR SCREENING MAMMOGRAM FOR BREAST CANCER: ICD-10-CM

## 2020-06-25 DIAGNOSIS — M05.742 RHEUMATOID ARTHRITIS INVOLVING BOTH HANDS WITH POSITIVE RHEUMATOID FACTOR (HCC): ICD-10-CM

## 2020-06-25 LAB — INR PPP: 1.97

## 2020-06-25 PROCEDURE — 80061 LIPID PANEL: CPT | Performed by: FAMILY MEDICINE

## 2020-06-25 PROCEDURE — 80053 COMPREHEN METABOLIC PANEL: CPT | Performed by: FAMILY MEDICINE

## 2020-06-25 PROCEDURE — 99214 OFFICE O/P EST MOD 30 MIN: CPT | Performed by: FAMILY MEDICINE

## 2020-06-25 PROCEDURE — 36415 COLL VENOUS BLD VENIPUNCTURE: CPT | Performed by: FAMILY MEDICINE

## 2020-06-25 PROCEDURE — 82043 UR ALBUMIN QUANTITATIVE: CPT | Performed by: FAMILY MEDICINE

## 2020-06-25 PROCEDURE — 90732 PPSV23 VACC 2 YRS+ SUBQ/IM: CPT | Performed by: FAMILY MEDICINE

## 2020-06-25 PROCEDURE — 83036 HEMOGLOBIN GLYCOSYLATED A1C: CPT | Performed by: FAMILY MEDICINE

## 2020-06-25 PROCEDURE — 82570 ASSAY OF URINE CREATININE: CPT | Performed by: FAMILY MEDICINE

## 2020-06-25 PROCEDURE — 90471 IMMUNIZATION ADMIN: CPT | Performed by: FAMILY MEDICINE

## 2020-06-25 PROCEDURE — 85610 PROTHROMBIN TIME: CPT | Performed by: FAMILY MEDICINE

## 2020-06-25 NOTE — PROGRESS NOTES
HPI:   Cruz Carrero is a 78year old female who presents for recheck of her diabetes. Patient’s FBS have been 120-140's. Last visit with ophthalmologist was 2018. Pt has been checking her feet on a regular basis. Pt denies any tingling of the feet.     P 09/12/2018     Lab Results   Component Value Date    LDL 80 09/19/2019    LDL 87 03/06/2019    LDL 72 09/12/2018     Lab Results   Component Value Date    TRIG 154 (H) 09/19/2019    TRIG 153 (H) 03/06/2019    TRIG 169 (H) 09/12/2018     Lab Results   [place] TAKE 1/2 TABLET BY MOUTH WEDNESDAY, FRIDAY, SUNDAY. ) 90 tablet 0      Past Medical History:   Diagnosis Date   • Depression    • Hyperlipidemia    • Knee osteoarthritis 2/16/2014   • Obesity    • Osteoarthritis    • Sleep apnea    • Solitary kidney, conge tenderness  EXTREMITIES: no cyanosis, clubbing or edema  NEURO: sensation is intact to monofilament   PSYCH:  Speech, mood and affect are appropriate  MUSC: joints unremarkable, no obvious deformity, no joint swelling. FROM.  Muscle tone and strength normal HEMOGLOBIN A1C; Future    3. Mixed hyperlipidemia  Pt to continue their medication, increase exercise,  choose better fats,  increase fiber and avoid processed foods.    - LIPID PANEL; Future  - HEMOGLOBIN A1C; Future    4.  Type 2 diabetes mellitus with hy

## 2020-07-10 ENCOUNTER — ANTI-COAG (OUTPATIENT)
Dept: CARDIOLOGY | Age: 70
End: 2020-07-10

## 2020-07-10 DIAGNOSIS — I74.3 EMBOLISM AND THROMBOSIS OF ARTERIES OF LOWER EXTREMITY (CMD): ICD-10-CM

## 2020-07-22 NOTE — TELEPHONE ENCOUNTER
Patient called requesting refill for: HYDROcodone-acetaminophen 5-325 MG Oral Tab    To be sent to:  [facility] AT [address], [phone], [phone]

## 2020-07-23 RX ORDER — HYDROCODONE BITARTRATE AND ACETAMINOPHEN 5; 325 MG/1; MG/1
1 TABLET ORAL EVERY 6 HOURS PRN
Qty: 120 TABLET | Refills: 0 | Status: SHIPPED | OUTPATIENT
Start: 2020-07-23 | End: 2020-08-26

## 2020-08-25 NOTE — TELEPHONE ENCOUNTER
Patient calling in requesting a refill for RX HYDROcodone-acetaminophen 5-325 MG Oral Tab    [facility], [address] AT [facility], [phone], [phone]

## 2020-08-26 RX ORDER — HYDROCODONE BITARTRATE AND ACETAMINOPHEN 5; 325 MG/1; MG/1
1 TABLET ORAL EVERY 6 HOURS PRN
Qty: 120 TABLET | Refills: 0 | Status: SHIPPED | OUTPATIENT
Start: 2020-08-26 | End: 2020-09-28

## 2020-08-26 NOTE — TELEPHONE ENCOUNTER
norco  Last OV relevant to medication: 6-25-20  Last refill date: 7-23-20 #/refills: 0  When pt was asked to return for OV: 6 mo.    Upcoming appt/reason: none  Recent labs: none

## 2020-09-08 DIAGNOSIS — E11.65 TYPE 2 DIABETES MELLITUS WITH HYPERGLYCEMIA, WITHOUT LONG-TERM CURRENT USE OF INSULIN (HCC): ICD-10-CM

## 2020-09-08 DIAGNOSIS — F32.A DEPRESSION, UNSPECIFIED DEPRESSION TYPE: ICD-10-CM

## 2020-09-08 RX ORDER — FLUOXETINE HYDROCHLORIDE 40 MG/1
CAPSULE ORAL
Qty: 90 CAPSULE | Refills: 0 | Status: SHIPPED | OUTPATIENT
Start: 2020-09-08 | End: 2020-12-17

## 2020-09-08 RX ORDER — LANCETS
EACH MISCELLANEOUS
Qty: 100 EACH | Refills: 0 | Status: SHIPPED | OUTPATIENT
Start: 2020-09-08 | End: 2021-12-29

## 2020-09-08 RX ORDER — BLOOD SUGAR DIAGNOSTIC
STRIP MISCELLANEOUS
Qty: 100 STRIP | Refills: 0 | Status: SHIPPED | OUTPATIENT
Start: 2020-09-08 | End: 2021-12-29

## 2020-09-08 NOTE — TELEPHONE ENCOUNTER
Fluoxetine HCL 40 mg  Last OV relevant to medication: 6-25-20  Last refill date: 6-18-20 #/refills: 0  When pt was asked to return for OV: 6 mo.   Upcoming appt/reason: none  Recent labs: none

## 2020-09-28 ENCOUNTER — TELEPHONE (OUTPATIENT)
Dept: INTERNAL MEDICINE CLINIC | Facility: CLINIC | Age: 70
End: 2020-09-28

## 2020-09-28 RX ORDER — HYDROCODONE BITARTRATE AND ACETAMINOPHEN 5; 325 MG/1; MG/1
1 TABLET ORAL EVERY 6 HOURS PRN
Qty: 120 TABLET | Refills: 0 | Status: SHIPPED | OUTPATIENT
Start: 2020-09-28 | End: 2020-10-01

## 2020-09-28 NOTE — TELEPHONE ENCOUNTER
HYDROcodone-acetaminophen 5-325 MG   [facility] #[id number] - [place], [address] AT [address] [facility], [phone], [phone]

## 2020-10-01 RX ORDER — HYDROCODONE BITARTRATE AND ACETAMINOPHEN 5; 325 MG/1; MG/1
1 TABLET ORAL EVERY 6 HOURS PRN
Qty: 120 TABLET | Refills: 0 | Status: SHIPPED | OUTPATIENT
Start: 2020-10-01 | End: 2020-10-28

## 2020-10-04 DIAGNOSIS — I10 ESSENTIAL HYPERTENSION: ICD-10-CM

## 2020-10-04 DIAGNOSIS — E78.2 MIXED HYPERLIPIDEMIA: ICD-10-CM

## 2020-10-05 RX ORDER — FELODIPINE 10 MG/1
TABLET, EXTENDED RELEASE ORAL
Qty: 90 TABLET | Refills: 0 | Status: SHIPPED | OUTPATIENT
Start: 2020-10-05 | End: 2020-12-17

## 2020-10-05 RX ORDER — ATORVASTATIN CALCIUM 10 MG/1
TABLET, FILM COATED ORAL
Qty: 90 TABLET | Refills: 0 | Status: SHIPPED | OUTPATIENT
Start: 2020-10-05 | End: 2020-12-17

## 2020-10-05 RX ORDER — TRIAMTERENE AND HYDROCHLOROTHIAZIDE 37.5; 25 MG/1; MG/1
1 TABLET ORAL DAILY
Qty: 90 TABLET | Refills: 0 | Status: SHIPPED | OUTPATIENT
Start: 2020-10-05 | End: 2020-12-17

## 2020-10-28 NOTE — TELEPHONE ENCOUNTER
HYDROcodone-acetaminophen 5-325 MG   [facility] #[id number] - [place], [address] AT [address] [facility], [phone], [phone]

## 2020-10-29 ENCOUNTER — ANTI-COAG (OUTPATIENT)
Dept: CARDIOLOGY | Age: 70
End: 2020-10-29

## 2020-10-29 ENCOUNTER — LAB SERVICES (OUTPATIENT)
Dept: LAB | Age: 70
End: 2020-10-29

## 2020-10-29 DIAGNOSIS — I74.3 EMBOLISM AND THROMBOSIS OF ARTERIES OF LOWER EXTREMITY (CMD): ICD-10-CM

## 2020-10-29 LAB
INR BLDC: 3.2
INR PPP: 3.2

## 2020-10-29 PROCEDURE — 85610 PROTHROMBIN TIME: CPT | Performed by: INTERNAL MEDICINE

## 2020-10-29 PROCEDURE — 36416 COLLJ CAPILLARY BLOOD SPEC: CPT | Performed by: INTERNAL MEDICINE

## 2020-10-29 RX ORDER — WARFARIN SODIUM 5 MG/1
2.5-5 TABLET ORAL DAILY
Qty: 30 TABLET | Refills: 0 | Status: SHIPPED | OUTPATIENT
Start: 2020-10-29 | End: 2023-10-03 | Stop reason: SDUPTHER

## 2020-10-29 RX ORDER — HYDROCODONE BITARTRATE AND ACETAMINOPHEN 5; 325 MG/1; MG/1
1 TABLET ORAL EVERY 6 HOURS PRN
Qty: 120 TABLET | Refills: 0 | Status: SHIPPED | OUTPATIENT
Start: 2020-11-01 | End: 2020-11-30

## 2020-10-29 NOTE — TELEPHONE ENCOUNTER
Pended rx post dated to 11/1/202- last rx was from 10/1 (per OV not uses PRN)    No protocol     Last refill:  10/1/2020 Hydrocodone 5-325 mg #120 NR    LOV:    6/25/2020 Corrin Mcburney RTC 6 months  9.  Rheumatoid arthritis involving both hands with positive

## 2020-11-30 ENCOUNTER — TELEPHONE (OUTPATIENT)
Dept: INTERNAL MEDICINE CLINIC | Facility: CLINIC | Age: 70
End: 2020-11-30

## 2020-11-30 RX ORDER — HYDROCODONE BITARTRATE AND ACETAMINOPHEN 5; 325 MG/1; MG/1
1 TABLET ORAL EVERY 6 HOURS PRN
Qty: 120 TABLET | Refills: 0 | Status: SHIPPED | OUTPATIENT
Start: 2020-11-30 | End: 2020-12-17

## 2020-11-30 NOTE — TELEPHONE ENCOUNTER
Patient calling for [facility] refill/new rx to Mendel Gonzalez on file; pt scheduled med f/u with SM next week fyi

## 2020-12-02 RX ORDER — WARFARIN SODIUM 5 MG/1
2.5-5 TABLET ORAL DAILY
Qty: 14 TABLET | Refills: 0 | Status: SHIPPED | OUTPATIENT
Start: 2020-12-02 | End: 2023-10-03 | Stop reason: SDUPTHER

## 2020-12-14 ENCOUNTER — TELEPHONE (OUTPATIENT)
Dept: INTERNAL MEDICINE CLINIC | Facility: CLINIC | Age: 70
End: 2020-12-14

## 2020-12-14 DIAGNOSIS — E55.9 VITAMIN D DEFICIENCY: ICD-10-CM

## 2020-12-14 DIAGNOSIS — Z00.00 ROUTINE GENERAL MEDICAL EXAMINATION AT A HEALTH CARE FACILITY: Primary | ICD-10-CM

## 2020-12-14 DIAGNOSIS — I10 ESSENTIAL HYPERTENSION: ICD-10-CM

## 2020-12-14 DIAGNOSIS — E78.2 MIXED HYPERLIPIDEMIA: ICD-10-CM

## 2020-12-14 DIAGNOSIS — E11.65 TYPE 2 DIABETES MELLITUS WITH HYPERGLYCEMIA, WITHOUT LONG-TERM CURRENT USE OF INSULIN (HCC): ICD-10-CM

## 2020-12-14 NOTE — TELEPHONE ENCOUNTER
Patient is seeing  on Thursday this week for physical she would like lab orders entered so possibly she can get drawn at Lab f/b her 10 am appointment that day.  Please call when ordered so she knows to schedule

## 2020-12-16 NOTE — PROGRESS NOTES
HPI:   Omar Pickard is a 78year old female who presents for a complete physical exam. Symptoms: denies discharge, itching, burning or dysuria, is menopausal.    Regular SBE- yes,Sexually active- occ,  Contraception- postmenopausal. STD history- none Encounters:  12/17/20 : 132/62  06/25/20 : 132/82  10/18/19 : (!) 173/61          Screening:    Immunization History  Administered            Date(s) Administered    FLUZONE 6 months and older PFS 0.5 ml (73736)                          02/19/2015      Inf CHOLEST 165 09/19/2019    CHOLEST 173 03/06/2019     Lab Results   Component Value Date    HDL 52 06/25/2020    HDL 54 09/19/2019    HDL 55 03/06/2019     Lab Results   Component Value Date    LDL 53 06/25/2020    LDL 80 09/19/2019    LDL 87 03/06/2019 Procedure Laterality Date   • CHOLECYSTECTOMY     • OTHER SURGICAL HISTORY  1996, 1997    left femoral angiogram/angioplasty and stent   • OTHER SURGICAL HISTORY  age 34    right ankle fx (slipped on ice)      Family History   Problem Relation Age of Ons normal optic disk,conjunctiva are clear  NECK: supple,no adenopathy,no bruits  CHEST: no chest tenderness  BREAST: no dominant or suspicious mass  LUNGS: clear to auscultation  CARDIO: RRR without murmur  GI: good BS's,no masses, HSM or tenderness  :defe Refill: 0    6. Mixed hyperlipidemia  Pt to continue their medication, increase exercise,  choose better fats,  increase fiber and avoid processed foods. - atorvastatin 10 MG Oral Tab; Take 1 tablet (10 mg total) by mouth daily. Dispense: 90 tablet;  Re

## 2020-12-17 ENCOUNTER — OFFICE VISIT (OUTPATIENT)
Dept: INTERNAL MEDICINE CLINIC | Facility: CLINIC | Age: 70
End: 2020-12-17
Payer: COMMERCIAL

## 2020-12-17 ENCOUNTER — LAB ENCOUNTER (OUTPATIENT)
Dept: LAB | Age: 70
End: 2020-12-17
Attending: FAMILY MEDICINE
Payer: COMMERCIAL

## 2020-12-17 VITALS
DIASTOLIC BLOOD PRESSURE: 62 MMHG | TEMPERATURE: 99 F | WEIGHT: 266.5 LBS | OXYGEN SATURATION: 98 % | HEIGHT: 61.5 IN | RESPIRATION RATE: 20 BRPM | SYSTOLIC BLOOD PRESSURE: 132 MMHG | BODY MASS INDEX: 49.67 KG/M2 | HEART RATE: 67 BPM

## 2020-12-17 DIAGNOSIS — I10 ESSENTIAL HYPERTENSION: ICD-10-CM

## 2020-12-17 DIAGNOSIS — E78.2 MIXED HYPERLIPIDEMIA: ICD-10-CM

## 2020-12-17 DIAGNOSIS — Z00.00 ROUTINE GENERAL MEDICAL EXAMINATION AT A HEALTH CARE FACILITY: ICD-10-CM

## 2020-12-17 DIAGNOSIS — E11.65 TYPE 2 DIABETES MELLITUS WITH HYPERGLYCEMIA, WITHOUT LONG-TERM CURRENT USE OF INSULIN (HCC): ICD-10-CM

## 2020-12-17 DIAGNOSIS — Z23 NEED FOR SHINGLES VACCINE: ICD-10-CM

## 2020-12-17 DIAGNOSIS — I74.3 EMBOLISM AND THROMBOSIS OF ARTERIES OF LOWER EXTREMITIES (HCC): ICD-10-CM

## 2020-12-17 DIAGNOSIS — M19.071 OSTEOARTHRITIS OF RIGHT ANKLE, UNSPECIFIED OSTEOARTHRITIS TYPE: ICD-10-CM

## 2020-12-17 DIAGNOSIS — M05.742 RHEUMATOID ARTHRITIS INVOLVING BOTH HANDS WITH POSITIVE RHEUMATOID FACTOR (HCC): ICD-10-CM

## 2020-12-17 DIAGNOSIS — F32.A DEPRESSION, UNSPECIFIED DEPRESSION TYPE: ICD-10-CM

## 2020-12-17 DIAGNOSIS — Z12.11 ENCOUNTER FOR SCREENING FECAL OCCULT BLOOD TESTING: ICD-10-CM

## 2020-12-17 DIAGNOSIS — M05.741 RHEUMATOID ARTHRITIS INVOLVING BOTH HANDS WITH POSITIVE RHEUMATOID FACTOR (HCC): ICD-10-CM

## 2020-12-17 DIAGNOSIS — E11.9 DIABETES MELLITUS WITHOUT COMPLICATION (HCC): ICD-10-CM

## 2020-12-17 DIAGNOSIS — Z00.00 ROUTINE GENERAL MEDICAL EXAMINATION AT A HEALTH CARE FACILITY: Primary | ICD-10-CM

## 2020-12-17 DIAGNOSIS — E55.9 VITAMIN D DEFICIENCY: ICD-10-CM

## 2020-12-17 DIAGNOSIS — Z23 NEED FOR TDAP VACCINATION: ICD-10-CM

## 2020-12-17 LAB — INR PPP: 2.09

## 2020-12-17 PROCEDURE — 90750 HZV VACC RECOMBINANT IM: CPT | Performed by: FAMILY MEDICINE

## 2020-12-17 PROCEDURE — 83036 HEMOGLOBIN GLYCOSYLATED A1C: CPT

## 2020-12-17 PROCEDURE — 3075F SYST BP GE 130 - 139MM HG: CPT | Performed by: FAMILY MEDICINE

## 2020-12-17 PROCEDURE — 3008F BODY MASS INDEX DOCD: CPT | Performed by: FAMILY MEDICINE

## 2020-12-17 PROCEDURE — 85025 COMPLETE CBC W/AUTO DIFF WBC: CPT

## 2020-12-17 PROCEDURE — 90472 IMMUNIZATION ADMIN EACH ADD: CPT | Performed by: FAMILY MEDICINE

## 2020-12-17 PROCEDURE — 84443 ASSAY THYROID STIM HORMONE: CPT

## 2020-12-17 PROCEDURE — 36415 COLL VENOUS BLD VENIPUNCTURE: CPT

## 2020-12-17 PROCEDURE — 90715 TDAP VACCINE 7 YRS/> IM: CPT | Performed by: FAMILY MEDICINE

## 2020-12-17 PROCEDURE — 85610 PROTHROMBIN TIME: CPT

## 2020-12-17 PROCEDURE — 82306 VITAMIN D 25 HYDROXY: CPT

## 2020-12-17 PROCEDURE — 90471 IMMUNIZATION ADMIN: CPT | Performed by: FAMILY MEDICINE

## 2020-12-17 PROCEDURE — 3078F DIAST BP <80 MM HG: CPT | Performed by: FAMILY MEDICINE

## 2020-12-17 PROCEDURE — 99397 PER PM REEVAL EST PAT 65+ YR: CPT | Performed by: FAMILY MEDICINE

## 2020-12-17 PROCEDURE — 80053 COMPREHEN METABOLIC PANEL: CPT

## 2020-12-17 PROCEDURE — 80061 LIPID PANEL: CPT

## 2020-12-17 PROCEDURE — 81001 URINALYSIS AUTO W/SCOPE: CPT

## 2020-12-17 PROCEDURE — 99214 OFFICE O/P EST MOD 30 MIN: CPT | Performed by: FAMILY MEDICINE

## 2020-12-17 RX ORDER — HYDROCODONE BITARTRATE AND ACETAMINOPHEN 5; 325 MG/1; MG/1
1 TABLET ORAL EVERY 6 HOURS PRN
Qty: 120 TABLET | Refills: 0 | Status: SHIPPED | OUTPATIENT
Start: 2020-12-17 | End: 2021-02-04

## 2020-12-17 RX ORDER — TRIAMTERENE AND HYDROCHLOROTHIAZIDE 37.5; 25 MG/1; MG/1
1 TABLET ORAL DAILY
Qty: 90 TABLET | Refills: 0 | Status: SHIPPED | OUTPATIENT
Start: 2020-12-17 | End: 2021-04-14

## 2020-12-17 RX ORDER — FELODIPINE 10 MG/1
10 TABLET, EXTENDED RELEASE ORAL DAILY
Qty: 90 TABLET | Refills: 0 | Status: SHIPPED | OUTPATIENT
Start: 2020-12-17 | End: 2021-04-05

## 2020-12-17 RX ORDER — ATORVASTATIN CALCIUM 10 MG/1
10 TABLET, FILM COATED ORAL DAILY
Qty: 90 TABLET | Refills: 0 | Status: SHIPPED | OUTPATIENT
Start: 2020-12-17 | End: 2021-04-14

## 2020-12-17 RX ORDER — FLUOXETINE HYDROCHLORIDE 40 MG/1
40 CAPSULE ORAL DAILY
Qty: 90 CAPSULE | Refills: 0 | Status: SHIPPED | OUTPATIENT
Start: 2020-12-17 | End: 2021-03-16

## 2020-12-18 ENCOUNTER — ANTI-COAG (OUTPATIENT)
Dept: CARDIOLOGY | Age: 70
End: 2020-12-18

## 2020-12-18 DIAGNOSIS — I74.3 EMBOLISM AND THROMBOSIS OF ARTERIES OF LOWER EXTREMITY (CMD): ICD-10-CM

## 2020-12-18 RX ORDER — WARFARIN SODIUM 5 MG/1
5 TABLET ORAL DAILY
Qty: 90 TABLET | Refills: 1 | Status: SHIPPED | OUTPATIENT
Start: 2020-12-18 | End: 2021-07-19

## 2020-12-21 ENCOUNTER — TELEPHONE (OUTPATIENT)
Dept: INTERNAL MEDICINE CLINIC | Facility: CLINIC | Age: 70
End: 2020-12-21

## 2020-12-21 DIAGNOSIS — R30.0 DYSURIA: Primary | ICD-10-CM

## 2020-12-21 NOTE — TELEPHONE ENCOUNTER
Spoke with patient notified Rocio Picking has been ordered to make sure that is what is going on. Patient stating will go tomorrow to complete UCX. Patient verbalized understanding and agreeable to POC.

## 2020-12-21 NOTE — TELEPHONE ENCOUNTER
Patient was not feeling bad at time of recent visit with SM but her urine color concerned Ang Luong so they did test; today she woke up and feels \"very bad, fatigued and has painful urination ie:  Poss UTI? \"  Since she was just seen can SM just send in RX me

## 2020-12-21 NOTE — TELEPHONE ENCOUNTER
I would like a urine culture to make sure that is what  is going on. If she is unable to provide one let me know.

## 2020-12-22 ENCOUNTER — LAB ENCOUNTER (OUTPATIENT)
Dept: LAB | Age: 70
End: 2020-12-22
Attending: FAMILY MEDICINE
Payer: COMMERCIAL

## 2020-12-22 DIAGNOSIS — R30.0 DYSURIA: ICD-10-CM

## 2020-12-22 PROCEDURE — 87077 CULTURE AEROBIC IDENTIFY: CPT

## 2020-12-22 PROCEDURE — 87086 URINE CULTURE/COLONY COUNT: CPT

## 2020-12-23 ENCOUNTER — TELEPHONE (OUTPATIENT)
Dept: INTERNAL MEDICINE CLINIC | Facility: CLINIC | Age: 70
End: 2020-12-23

## 2021-02-02 DIAGNOSIS — Z23 NEED FOR VACCINATION: ICD-10-CM

## 2021-02-03 DIAGNOSIS — M05.742 RHEUMATOID ARTHRITIS INVOLVING BOTH HANDS WITH POSITIVE RHEUMATOID FACTOR (HCC): ICD-10-CM

## 2021-02-03 DIAGNOSIS — M19.071 OSTEOARTHRITIS OF RIGHT ANKLE, UNSPECIFIED OSTEOARTHRITIS TYPE: ICD-10-CM

## 2021-02-03 DIAGNOSIS — M05.741 RHEUMATOID ARTHRITIS INVOLVING BOTH HANDS WITH POSITIVE RHEUMATOID FACTOR (HCC): ICD-10-CM

## 2021-02-03 NOTE — TELEPHONE ENCOUNTER
Patient called requesting refill for:  HYDROcodone-acetaminophen 5-325 MG Oral Tab    [facility], [address] AT [address] [facility], [phone], [phone]

## 2021-02-04 RX ORDER — HYDROCODONE BITARTRATE AND ACETAMINOPHEN 5; 325 MG/1; MG/1
1 TABLET ORAL EVERY 6 HOURS PRN
Qty: 120 TABLET | Refills: 0 | Status: SHIPPED | OUTPATIENT
Start: 2021-02-04 | End: 2021-03-04

## 2021-02-08 ENCOUNTER — IMMUNIZATION (OUTPATIENT)
Dept: LAB | Age: 71
End: 2021-02-08
Attending: HOSPITALIST
Payer: COMMERCIAL

## 2021-02-08 DIAGNOSIS — Z23 NEED FOR VACCINATION: Primary | ICD-10-CM

## 2021-02-08 PROCEDURE — 0001A SARSCOV2 VAC 30MCG/0.3ML IM: CPT

## 2021-02-22 ENCOUNTER — APPOINTMENT (OUTPATIENT)
Dept: CARDIOLOGY | Age: 71
End: 2021-02-22

## 2021-03-01 ENCOUNTER — IMMUNIZATION (OUTPATIENT)
Dept: LAB | Age: 71
End: 2021-03-01
Attending: HOSPITALIST
Payer: COMMERCIAL

## 2021-03-01 DIAGNOSIS — Z23 NEED FOR VACCINATION: Primary | ICD-10-CM

## 2021-03-01 PROCEDURE — 0002A SARSCOV2 VAC 30MCG/0.3ML IM: CPT

## 2021-03-03 DIAGNOSIS — M19.071 OSTEOARTHRITIS OF RIGHT ANKLE, UNSPECIFIED OSTEOARTHRITIS TYPE: ICD-10-CM

## 2021-03-03 DIAGNOSIS — M05.741 RHEUMATOID ARTHRITIS INVOLVING BOTH HANDS WITH POSITIVE RHEUMATOID FACTOR (HCC): ICD-10-CM

## 2021-03-03 DIAGNOSIS — M05.742 RHEUMATOID ARTHRITIS INVOLVING BOTH HANDS WITH POSITIVE RHEUMATOID FACTOR (HCC): ICD-10-CM

## 2021-03-03 NOTE — TELEPHONE ENCOUNTER
Last OV:12/17/20  No protocol medication   Last time medication was refilled:2/4/21#120 with no refills   Medication routed to PCP for refill if appropriate. No future appointments.       11. Osteoarthritis of right ankle, unspecified osteoarthritis type

## 2021-03-03 NOTE — TELEPHONE ENCOUNTER
HYDROcodone-acetaminophen 5-325 MG Oral Tab 120 tablet 0 2/4/2021    Sig:   Take 1 tablet by mouth every 6 (six) hours as needed.      Route: Jeannette Magana Date:   2/4/2021       [facility] [address], [address] [facility]

## 2021-03-04 RX ORDER — HYDROCODONE BITARTRATE AND ACETAMINOPHEN 5; 325 MG/1; MG/1
1 TABLET ORAL EVERY 6 HOURS PRN
Qty: 120 TABLET | Refills: 0 | Status: SHIPPED | OUTPATIENT
Start: 2021-03-04 | End: 2021-04-05

## 2021-03-15 DIAGNOSIS — F32.A DEPRESSION, UNSPECIFIED DEPRESSION TYPE: ICD-10-CM

## 2021-03-16 RX ORDER — FLUOXETINE HYDROCHLORIDE 40 MG/1
CAPSULE ORAL
Qty: 90 CAPSULE | Refills: 0 | Status: SHIPPED | OUTPATIENT
Start: 2021-03-16 | End: 2021-06-14

## 2021-03-16 NOTE — TELEPHONE ENCOUNTER
Medication(s) to Refill:   Requested Prescriptions     Pending Prescriptions Disp Refills   • FLUOXETINE HCL 40 MG Oral Cap [Pharmacy Med Name: FLUOXETINE 40MG CAPSULES] 90 capsule 0     Sig: TAKE 1 CAPSULE(40 MG) BY MOUTH DAILY       LOV: 12-  RTC:

## 2021-03-22 ENCOUNTER — ANTI-COAG (OUTPATIENT)
Dept: CARDIOLOGY | Age: 71
End: 2021-03-22

## 2021-03-22 ENCOUNTER — OFFICE VISIT (OUTPATIENT)
Dept: CARDIOLOGY | Age: 71
End: 2021-03-22

## 2021-03-22 ENCOUNTER — LAB SERVICES (OUTPATIENT)
Dept: LAB | Age: 71
End: 2021-03-22

## 2021-03-22 VITALS
HEART RATE: 68 BPM | HEIGHT: 62 IN | BODY MASS INDEX: 48.23 KG/M2 | DIASTOLIC BLOOD PRESSURE: 70 MMHG | SYSTOLIC BLOOD PRESSURE: 132 MMHG | RESPIRATION RATE: 14 BRPM | WEIGHT: 262.1 LBS

## 2021-03-22 DIAGNOSIS — I10 ESSENTIAL HYPERTENSION: ICD-10-CM

## 2021-03-22 DIAGNOSIS — I74.3 EMBOLISM AND THROMBOSIS OF ARTERIES OF LOWER EXTREMITY (CMD): ICD-10-CM

## 2021-03-22 DIAGNOSIS — E78.00 PURE HYPERCHOLESTEROLEMIA: ICD-10-CM

## 2021-03-22 DIAGNOSIS — I70.209 ATHEROSCLEROSIS OF ARTERIES OF EXTREMITIES (CMD): ICD-10-CM

## 2021-03-22 DIAGNOSIS — E78.00 PURE HYPERCHOLESTEROLEMIA: Primary | ICD-10-CM

## 2021-03-22 LAB — INR BLDC: 2.5

## 2021-03-22 PROCEDURE — 3075F SYST BP GE 130 - 139MM HG: CPT | Performed by: INTERNAL MEDICINE

## 2021-03-22 PROCEDURE — 99215 OFFICE O/P EST HI 40 MIN: CPT | Performed by: INTERNAL MEDICINE

## 2021-03-22 PROCEDURE — 36416 COLLJ CAPILLARY BLOOD SPEC: CPT | Performed by: INTERNAL MEDICINE

## 2021-03-22 PROCEDURE — 3078F DIAST BP <80 MM HG: CPT | Performed by: INTERNAL MEDICINE

## 2021-03-22 PROCEDURE — 85610 PROTHROMBIN TIME: CPT | Performed by: INTERNAL MEDICINE

## 2021-03-22 SDOH — HEALTH STABILITY: PHYSICAL HEALTH: ON AVERAGE, HOW MANY MINUTES DO YOU ENGAGE IN EXERCISE AT THIS LEVEL?: 60 MIN

## 2021-03-22 SDOH — HEALTH STABILITY: MENTAL HEALTH: HOW OFTEN DO YOU HAVE A DRINK CONTAINING ALCOHOL?: 2-4 TIMES A MONTH

## 2021-03-22 SDOH — HEALTH STABILITY: PHYSICAL HEALTH: ON AVERAGE, HOW MANY DAYS PER WEEK DO YOU ENGAGE IN MODERATE TO STRENUOUS EXERCISE (LIKE A BRISK WALK)?: 3 DAYS

## 2021-03-22 ASSESSMENT — PATIENT HEALTH QUESTIONNAIRE - PHQ9
2. FEELING DOWN, DEPRESSED OR HOPELESS: NOT AT ALL
CLINICAL INTERPRETATION OF PHQ9 SCORE: NO FURTHER SCREENING NEEDED
1. LITTLE INTEREST OR PLEASURE IN DOING THINGS: NOT AT ALL
CLINICAL INTERPRETATION OF PHQ2 SCORE: NO FURTHER SCREENING NEEDED
SUM OF ALL RESPONSES TO PHQ9 QUESTIONS 1 AND 2: 0
SUM OF ALL RESPONSES TO PHQ9 QUESTIONS 1 AND 2: 0

## 2021-04-04 DIAGNOSIS — I10 ESSENTIAL HYPERTENSION: ICD-10-CM

## 2021-04-05 DIAGNOSIS — M19.071 OSTEOARTHRITIS OF RIGHT ANKLE, UNSPECIFIED OSTEOARTHRITIS TYPE: ICD-10-CM

## 2021-04-05 DIAGNOSIS — M05.742 RHEUMATOID ARTHRITIS INVOLVING BOTH HANDS WITH POSITIVE RHEUMATOID FACTOR (HCC): ICD-10-CM

## 2021-04-05 DIAGNOSIS — M05.741 RHEUMATOID ARTHRITIS INVOLVING BOTH HANDS WITH POSITIVE RHEUMATOID FACTOR (HCC): ICD-10-CM

## 2021-04-05 RX ORDER — FELODIPINE 10 MG/1
10 TABLET, EXTENDED RELEASE ORAL DAILY
Qty: 90 TABLET | Refills: 0 | Status: SHIPPED | OUTPATIENT
Start: 2021-04-05 | End: 2021-06-14

## 2021-04-05 RX ORDER — HYDROCODONE BITARTRATE AND ACETAMINOPHEN 5; 325 MG/1; MG/1
1 TABLET ORAL EVERY 6 HOURS PRN
Qty: 120 TABLET | Refills: 0 | Status: SHIPPED | OUTPATIENT
Start: 2021-04-05 | End: 2021-05-04

## 2021-04-05 NOTE — TELEPHONE ENCOUNTER
LOV: 12/17/2020 with IVETTE Anders  RTC: 6 months  Last Relevant Labs: 12/17/2020   Filled: 12/17/2020   #90 with 0 refills    No future appointments.

## 2021-04-13 DIAGNOSIS — E78.2 MIXED HYPERLIPIDEMIA: ICD-10-CM

## 2021-04-13 DIAGNOSIS — I10 ESSENTIAL HYPERTENSION: ICD-10-CM

## 2021-04-13 DIAGNOSIS — E11.9 DIABETES MELLITUS WITHOUT COMPLICATION (HCC): ICD-10-CM

## 2021-04-14 RX ORDER — TRIAMTERENE AND HYDROCHLOROTHIAZIDE 37.5; 25 MG/1; MG/1
1 TABLET ORAL DAILY
Qty: 90 TABLET | Refills: 0 | Status: SHIPPED | OUTPATIENT
Start: 2021-04-14 | End: 2021-06-14

## 2021-04-14 RX ORDER — ATORVASTATIN CALCIUM 10 MG/1
TABLET, FILM COATED ORAL
Qty: 90 TABLET | Refills: 0 | Status: SHIPPED | OUTPATIENT
Start: 2021-04-14 | End: 2021-06-14

## 2021-04-14 NOTE — TELEPHONE ENCOUNTER
LOV: 12/17/2020 with IVETTE Lezama  RTC: 6 months  Last Relevant Labs: December 2020  Filled: 12/17/2020    #3-month supply with 0 refills    No future appointments.

## 2021-05-04 ENCOUNTER — TELEPHONE (OUTPATIENT)
Dept: INTERNAL MEDICINE CLINIC | Facility: CLINIC | Age: 71
End: 2021-05-04

## 2021-05-04 DIAGNOSIS — M05.742 RHEUMATOID ARTHRITIS INVOLVING BOTH HANDS WITH POSITIVE RHEUMATOID FACTOR (HCC): ICD-10-CM

## 2021-05-04 DIAGNOSIS — M19.071 OSTEOARTHRITIS OF RIGHT ANKLE, UNSPECIFIED OSTEOARTHRITIS TYPE: ICD-10-CM

## 2021-05-04 DIAGNOSIS — M05.741 RHEUMATOID ARTHRITIS INVOLVING BOTH HANDS WITH POSITIVE RHEUMATOID FACTOR (HCC): ICD-10-CM

## 2021-05-04 RX ORDER — HYDROCODONE BITARTRATE AND ACETAMINOPHEN 5; 325 MG/1; MG/1
1 TABLET ORAL EVERY 6 HOURS PRN
Qty: 120 TABLET | Refills: 0 | Status: SHIPPED | OUTPATIENT
Start: 2021-05-04 | End: 2021-06-10

## 2021-05-04 NOTE — TELEPHONE ENCOUNTER
Patient requesting refill on medication.      HYDROcodone-acetaminophen 5-325 MG Oral Tab    [facility] #[id number] - [place], [place] - [address] AT [address] [facility], [phone], [phone]

## 2021-05-11 ENCOUNTER — TELEPHONE (OUTPATIENT)
Dept: CARDIOLOGY | Age: 71
End: 2021-05-11

## 2021-05-13 ENCOUNTER — ANTI-COAG (OUTPATIENT)
Dept: CARDIOLOGY | Age: 71
End: 2021-05-13

## 2021-05-13 DIAGNOSIS — I74.3 EMBOLISM AND THROMBOSIS OF ARTERIES OF LOWER EXTREMITY (CMD): ICD-10-CM

## 2021-05-20 ENCOUNTER — ANTI-COAG (OUTPATIENT)
Dept: CARDIOLOGY | Age: 71
End: 2021-05-20

## 2021-05-20 DIAGNOSIS — I74.3 EMBOLISM AND THROMBOSIS OF ARTERIES OF LOWER EXTREMITY (CMD): ICD-10-CM

## 2021-06-04 ENCOUNTER — ANTI-COAG (OUTPATIENT)
Dept: CARDIOLOGY | Age: 71
End: 2021-06-04

## 2021-06-04 DIAGNOSIS — I74.3 EMBOLISM AND THROMBOSIS OF ARTERIES OF LOWER EXTREMITY (CMD): ICD-10-CM

## 2021-06-07 DIAGNOSIS — M05.741 RHEUMATOID ARTHRITIS INVOLVING BOTH HANDS WITH POSITIVE RHEUMATOID FACTOR (HCC): ICD-10-CM

## 2021-06-07 DIAGNOSIS — M19.071 OSTEOARTHRITIS OF RIGHT ANKLE, UNSPECIFIED OSTEOARTHRITIS TYPE: ICD-10-CM

## 2021-06-07 DIAGNOSIS — M05.742 RHEUMATOID ARTHRITIS INVOLVING BOTH HANDS WITH POSITIVE RHEUMATOID FACTOR (HCC): ICD-10-CM

## 2021-06-07 RX ORDER — HYDROCODONE BITARTRATE AND ACETAMINOPHEN 5; 325 MG/1; MG/1
1 TABLET ORAL EVERY 6 HOURS PRN
Qty: 120 TABLET | Refills: 0 | OUTPATIENT
Start: 2021-06-07

## 2021-06-10 RX ORDER — HYDROCODONE BITARTRATE AND ACETAMINOPHEN 5; 325 MG/1; MG/1
1 TABLET ORAL EVERY 6 HOURS PRN
Qty: 120 TABLET | Refills: 0 | Status: SHIPPED | OUTPATIENT
Start: 2021-06-10 | End: 2021-07-12

## 2021-06-10 NOTE — TELEPHONE ENCOUNTER
Patient booked appointment for Monday. Patient is all out of medication. Please place short term refill.

## 2021-06-14 ENCOUNTER — LAB ENCOUNTER (OUTPATIENT)
Dept: LAB | Age: 71
End: 2021-06-14
Attending: FAMILY MEDICINE
Payer: COMMERCIAL

## 2021-06-14 ENCOUNTER — OFFICE VISIT (OUTPATIENT)
Dept: INTERNAL MEDICINE CLINIC | Facility: CLINIC | Age: 71
End: 2021-06-14
Payer: COMMERCIAL

## 2021-06-14 VITALS
WEIGHT: 260 LBS | SYSTOLIC BLOOD PRESSURE: 132 MMHG | HEIGHT: 61 IN | HEART RATE: 78 BPM | TEMPERATURE: 99 F | RESPIRATION RATE: 18 BRPM | BODY MASS INDEX: 49.09 KG/M2 | DIASTOLIC BLOOD PRESSURE: 70 MMHG

## 2021-06-14 DIAGNOSIS — E11.9 DIABETES MELLITUS WITHOUT COMPLICATION (HCC): ICD-10-CM

## 2021-06-14 DIAGNOSIS — I74.3 EMBOLISM AND THROMBOSIS OF ARTERIES OF LOWER EXTREMITIES (HCC): ICD-10-CM

## 2021-06-14 DIAGNOSIS — E78.2 MIXED HYPERLIPIDEMIA: ICD-10-CM

## 2021-06-14 DIAGNOSIS — I10 ESSENTIAL HYPERTENSION: ICD-10-CM

## 2021-06-14 DIAGNOSIS — F32.A DEPRESSION, UNSPECIFIED DEPRESSION TYPE: ICD-10-CM

## 2021-06-14 DIAGNOSIS — E28.39 ESTROGEN DEFICIENCY: ICD-10-CM

## 2021-06-14 DIAGNOSIS — E55.9 VITAMIN D DEFICIENCY: ICD-10-CM

## 2021-06-14 DIAGNOSIS — E55.9 VITAMIN D DEFICIENCY: Primary | ICD-10-CM

## 2021-06-14 DIAGNOSIS — L30.4 INTERTRIGO: ICD-10-CM

## 2021-06-14 DIAGNOSIS — Z12.31 ENCOUNTER FOR SCREENING MAMMOGRAM FOR BREAST CANCER: ICD-10-CM

## 2021-06-14 LAB — INR PPP: 2.5

## 2021-06-14 PROCEDURE — 99214 OFFICE O/P EST MOD 30 MIN: CPT | Performed by: FAMILY MEDICINE

## 2021-06-14 PROCEDURE — 82306 VITAMIN D 25 HYDROXY: CPT | Performed by: FAMILY MEDICINE

## 2021-06-14 PROCEDURE — 3078F DIAST BP <80 MM HG: CPT | Performed by: FAMILY MEDICINE

## 2021-06-14 PROCEDURE — 3008F BODY MASS INDEX DOCD: CPT | Performed by: FAMILY MEDICINE

## 2021-06-14 PROCEDURE — 85610 PROTHROMBIN TIME: CPT | Performed by: FAMILY MEDICINE

## 2021-06-14 PROCEDURE — 3075F SYST BP GE 130 - 139MM HG: CPT | Performed by: FAMILY MEDICINE

## 2021-06-14 RX ORDER — FLUOXETINE HYDROCHLORIDE 40 MG/1
40 CAPSULE ORAL DAILY
Qty: 90 CAPSULE | Refills: 0 | Status: SHIPPED | OUTPATIENT
Start: 2021-06-14 | End: 2021-09-09

## 2021-06-14 RX ORDER — NYSTATIN 100000 [USP'U]/G
1 POWDER TOPICAL 4 TIMES DAILY
Qty: 60 G | Refills: 1 | Status: SHIPPED | OUTPATIENT
Start: 2021-06-14

## 2021-06-14 RX ORDER — ATORVASTATIN CALCIUM 10 MG/1
10 TABLET, FILM COATED ORAL DAILY
Qty: 90 TABLET | Refills: 0 | Status: SHIPPED | OUTPATIENT
Start: 2021-06-14 | End: 2021-10-21

## 2021-06-14 RX ORDER — TRIAMTERENE AND HYDROCHLOROTHIAZIDE 37.5; 25 MG/1; MG/1
1 TABLET ORAL DAILY
Qty: 90 TABLET | Refills: 0 | Status: SHIPPED | OUTPATIENT
Start: 2021-06-14 | End: 2021-10-23

## 2021-06-14 RX ORDER — FELODIPINE 10 MG/1
10 TABLET, EXTENDED RELEASE ORAL DAILY
Qty: 90 TABLET | Refills: 0 | Status: SHIPPED | OUTPATIENT
Start: 2021-06-14 | End: 2021-09-09

## 2021-06-14 NOTE — PROGRESS NOTES
HPI:   Syed Taylor is a 73year old female who presents for recheck of her diabetes. Pt's weight down 6 pound since her last visit. Patient’s FBS have been   Pt tests her blood 1-2 times daily.   Last visit with ophthalmologist was 5/23/18 with [facility] kg/m².     HEMOGLOBIN A1C (%)   Date Value   03/11/2020 6.1 (A)     HEMOGLOBIN A1c (% of total Hgb)   Date Value   03/02/2017 6.6 (H)   08/11/2015 6.3 (H)   08/20/2014 5.8 (H)     HgbA1C (%)   Date Value   12/17/2020 6.2 (H)   06/25/2020 6.2 (H)   09/19/20 FLUoxetine HCl 40 MG Oral Cap Take 1 capsule (40 mg total) by mouth daily. 90 capsule 0   • Nystatin 150391 UNIT/GM External Powder Apply 1 Application topically 4 (four) times daily.  60 g 1   • HYDROcodone-acetaminophen 5-325 MG Oral Tab Take 1 tablet by polyuria. Genitourinary: Negative for dysuria. Musculoskeletal: Negative for joint pain. Skin: red rash under breasts  Neurological: Negative for numbness.    Hematological:Negative for hypoglycemia   Psychiatric/Behavioral: Negative for sleep disturb Oral Tab 90 tablet 0     Sig: Take 1 tablet (10 mg total) by mouth daily. • Triamterene-HCTZ 37.5-25 MG Oral Tab 90 tablet 0     Sig: Take 1 tablet by mouth daily.    • metFORMIN HCl 1000 MG Oral Tab 180 tablet 0     Sig: Take 1 tablet (1,000 mg total) by mg total) by mouth 2 (two) times daily with meals. Dispense: 180 tablet; Refill: 0    4. Depression, unspecified depression type  - stable, continue medication.  - FLUoxetine HCl 40 MG Oral Cap; Take 1 capsule (40 mg total) by mouth daily.   Dispense: 90 c

## 2021-06-16 ENCOUNTER — ANTI-COAG (OUTPATIENT)
Dept: CARDIOLOGY | Age: 71
End: 2021-06-16

## 2021-06-16 DIAGNOSIS — I74.3 EMBOLISM AND THROMBOSIS OF ARTERIES OF LOWER EXTREMITY (CMD): Primary | ICD-10-CM

## 2021-06-22 ENCOUNTER — TELEPHONE (OUTPATIENT)
Dept: CARDIOLOGY | Age: 71
End: 2021-06-22

## 2021-06-22 DIAGNOSIS — I74.3 EMBOLISM AND THROMBOSIS OF ARTERIES OF LOWER EXTREMITY (CMD): Primary | ICD-10-CM

## 2021-07-09 DIAGNOSIS — M19.071 OSTEOARTHRITIS OF RIGHT ANKLE, UNSPECIFIED OSTEOARTHRITIS TYPE: ICD-10-CM

## 2021-07-09 DIAGNOSIS — M05.741 RHEUMATOID ARTHRITIS INVOLVING BOTH HANDS WITH POSITIVE RHEUMATOID FACTOR (HCC): ICD-10-CM

## 2021-07-09 DIAGNOSIS — M05.742 RHEUMATOID ARTHRITIS INVOLVING BOTH HANDS WITH POSITIVE RHEUMATOID FACTOR (HCC): ICD-10-CM

## 2021-07-09 RX ORDER — HYDROCODONE BITARTRATE AND ACETAMINOPHEN 5; 325 MG/1; MG/1
1 TABLET ORAL EVERY 6 HOURS PRN
Qty: 120 TABLET | Refills: 0 | OUTPATIENT
Start: 2021-07-09

## 2021-07-12 RX ORDER — HYDROCODONE BITARTRATE AND ACETAMINOPHEN 5; 325 MG/1; MG/1
1 TABLET ORAL EVERY 6 HOURS PRN
Qty: 120 TABLET | Refills: 0 | Status: SHIPPED | OUTPATIENT
Start: 2021-07-12 | End: 2021-08-10

## 2021-07-12 NOTE — TELEPHONE ENCOUNTER
Pt requesting refill  - pt stated she is out of medication    HYDROcodone-acetaminophen 5-325 MG Oral Tab    [facility] #[id number] - [place], [address] AT [address] [place] [address], [phone], [phone]

## 2021-07-18 ENCOUNTER — TELEPHONE (OUTPATIENT)
Dept: CARDIOLOGY | Age: 71
End: 2021-07-18

## 2021-07-19 RX ORDER — WARFARIN SODIUM 5 MG/1
5 TABLET ORAL DAILY
Qty: 30 TABLET | Refills: 0 | Status: SHIPPED | OUTPATIENT
Start: 2021-07-19 | End: 2022-09-26 | Stop reason: SDUPTHER

## 2021-07-20 RX ORDER — WARFARIN SODIUM 5 MG/1
2.5-5 TABLET ORAL DAILY
Qty: 14 TABLET | Refills: 0 | OUTPATIENT
Start: 2021-07-20 | End: 2021-08-03

## 2021-07-21 ENCOUNTER — TELEPHONE (OUTPATIENT)
Dept: CARDIOLOGY | Age: 71
End: 2021-07-21

## 2021-08-10 DIAGNOSIS — M05.741 RHEUMATOID ARTHRITIS INVOLVING BOTH HANDS WITH POSITIVE RHEUMATOID FACTOR (HCC): ICD-10-CM

## 2021-08-10 DIAGNOSIS — M19.071 OSTEOARTHRITIS OF RIGHT ANKLE, UNSPECIFIED OSTEOARTHRITIS TYPE: ICD-10-CM

## 2021-08-10 DIAGNOSIS — M05.742 RHEUMATOID ARTHRITIS INVOLVING BOTH HANDS WITH POSITIVE RHEUMATOID FACTOR (HCC): ICD-10-CM

## 2021-08-11 RX ORDER — HYDROCODONE BITARTRATE AND ACETAMINOPHEN 5; 325 MG/1; MG/1
1 TABLET ORAL EVERY 6 HOURS PRN
Qty: 120 TABLET | Refills: 0 | Status: SHIPPED | OUTPATIENT
Start: 2021-08-11 | End: 2021-09-13

## 2021-08-11 NOTE — TELEPHONE ENCOUNTER
No Protocol     Requesting: Hydrocodone-acetaminophen 5-325mg     LOV: 6/14/21   RTC: 6 months   Filled: 7/12/1 #120 0 refills       Upcoming OV: none scheduled

## 2021-09-09 DIAGNOSIS — F32.A DEPRESSION, UNSPECIFIED DEPRESSION TYPE: ICD-10-CM

## 2021-09-09 DIAGNOSIS — I10 ESSENTIAL HYPERTENSION: ICD-10-CM

## 2021-09-09 RX ORDER — FLUOXETINE HYDROCHLORIDE 40 MG/1
CAPSULE ORAL
Qty: 90 CAPSULE | Refills: 0 | Status: SHIPPED | OUTPATIENT
Start: 2021-09-09 | End: 2021-12-13

## 2021-09-09 RX ORDER — FELODIPINE 10 MG/1
TABLET, EXTENDED RELEASE ORAL
Qty: 90 TABLET | Refills: 0 | Status: SHIPPED | OUTPATIENT
Start: 2021-09-09 | End: 2021-12-13

## 2021-09-09 NOTE — TELEPHONE ENCOUNTER
Felodipine ER 10 mg  Filled 6-14-21  Qty 90  0 refills  No upcoming appt. LOV 6-14-21    Fluoxetine HCl 40 mg  Filled 6-14-21  Qty 90  0 refills  No upcoming appt.   LOV 6-14-21

## 2021-09-13 DIAGNOSIS — M05.742 RHEUMATOID ARTHRITIS INVOLVING BOTH HANDS WITH POSITIVE RHEUMATOID FACTOR (HCC): ICD-10-CM

## 2021-09-13 DIAGNOSIS — M05.741 RHEUMATOID ARTHRITIS INVOLVING BOTH HANDS WITH POSITIVE RHEUMATOID FACTOR (HCC): ICD-10-CM

## 2021-09-13 DIAGNOSIS — M19.071 OSTEOARTHRITIS OF RIGHT ANKLE, UNSPECIFIED OSTEOARTHRITIS TYPE: ICD-10-CM

## 2021-09-13 RX ORDER — HYDROCODONE BITARTRATE AND ACETAMINOPHEN 5; 325 MG/1; MG/1
1 TABLET ORAL EVERY 6 HOURS PRN
Qty: 120 TABLET | Refills: 0 | Status: SHIPPED | OUTPATIENT
Start: 2021-09-13 | End: 2021-10-19

## 2021-09-13 NOTE — TELEPHONE ENCOUNTER
HYDROcodone-acetaminophen 5-325 MG Oral Tab         Sig: Take 1 tablet by mouth every 6 (six) hours as needed. LABS DUE!     Disp:  120 tablet    Refills:  0    Start: 9/13/2021    Earliest Fill Date: 9/13/2021    Class: Normal    Non-formulary For: Rheuma

## 2021-09-17 ENCOUNTER — TELEPHONE (OUTPATIENT)
Dept: CARDIOLOGY | Age: 71
End: 2021-09-17

## 2021-09-18 ENCOUNTER — LAB SERVICES (OUTPATIENT)
Dept: LAB | Age: 71
End: 2021-09-18

## 2021-09-18 DIAGNOSIS — I10 ESSENTIAL HYPERTENSION: ICD-10-CM

## 2021-09-18 DIAGNOSIS — I74.3 EMBOLISM AND THROMBOSIS OF ARTERIES OF LOWER EXTREMITY (CMD): ICD-10-CM

## 2021-09-18 DIAGNOSIS — I70.209 ATHEROSCLEROSIS OF ARTERIES OF EXTREMITIES (CMD): ICD-10-CM

## 2021-09-18 DIAGNOSIS — E78.00 PURE HYPERCHOLESTEROLEMIA: ICD-10-CM

## 2021-09-18 LAB — INR BLDC: 2.4

## 2021-09-18 PROCEDURE — 36416 COLLJ CAPILLARY BLOOD SPEC: CPT | Performed by: INTERNAL MEDICINE

## 2021-09-18 PROCEDURE — 85610 PROTHROMBIN TIME: CPT | Performed by: INTERNAL MEDICINE

## 2021-09-20 ENCOUNTER — ANTI-COAG (OUTPATIENT)
Dept: CARDIOLOGY | Age: 71
End: 2021-09-20

## 2021-09-20 ENCOUNTER — TELEPHONE (OUTPATIENT)
Dept: CARDIOLOGY | Age: 71
End: 2021-09-20

## 2021-09-20 DIAGNOSIS — I74.3 EMBOLISM AND THROMBOSIS OF ARTERIES OF LOWER EXTREMITY (CMD): Primary | ICD-10-CM

## 2021-09-20 RX ORDER — WARFARIN SODIUM 5 MG/1
TABLET ORAL
Qty: 80 TABLET | Refills: 1 | Status: SHIPPED | OUTPATIENT
Start: 2021-09-20 | End: 2022-12-08 | Stop reason: SDUPTHER

## 2021-09-22 DIAGNOSIS — I74.3 EMBOLISM AND THROMBOSIS OF ARTERIES OF LOWER EXTREMITY (CMD): Primary | ICD-10-CM

## 2021-10-19 ENCOUNTER — IMMUNIZATION (OUTPATIENT)
Dept: LAB | Facility: HOSPITAL | Age: 71
End: 2021-10-19
Attending: EMERGENCY MEDICINE
Payer: COMMERCIAL

## 2021-10-19 ENCOUNTER — TELEPHONE (OUTPATIENT)
Dept: INTERNAL MEDICINE CLINIC | Facility: CLINIC | Age: 71
End: 2021-10-19

## 2021-10-19 ENCOUNTER — TELEPHONE (OUTPATIENT)
Dept: CARDIOLOGY | Age: 71
End: 2021-10-19

## 2021-10-19 DIAGNOSIS — M05.742 RHEUMATOID ARTHRITIS INVOLVING BOTH HANDS WITH POSITIVE RHEUMATOID FACTOR (HCC): ICD-10-CM

## 2021-10-19 DIAGNOSIS — M19.071 OSTEOARTHRITIS OF RIGHT ANKLE, UNSPECIFIED OSTEOARTHRITIS TYPE: ICD-10-CM

## 2021-10-19 DIAGNOSIS — Z23 NEED FOR VACCINATION: Primary | ICD-10-CM

## 2021-10-19 DIAGNOSIS — M05.741 RHEUMATOID ARTHRITIS INVOLVING BOTH HANDS WITH POSITIVE RHEUMATOID FACTOR (HCC): ICD-10-CM

## 2021-10-19 PROCEDURE — 0003A SARSCOV2 VAC 30MCG/0.3ML IM: CPT

## 2021-10-19 RX ORDER — HYDROCODONE BITARTRATE AND ACETAMINOPHEN 5; 325 MG/1; MG/1
1 TABLET ORAL EVERY 6 HOURS PRN
Qty: 120 TABLET | Refills: 0 | Status: SHIPPED | OUTPATIENT
Start: 2021-10-19 | End: 2021-11-18

## 2021-10-19 NOTE — TELEPHONE ENCOUNTER
HYDROcodone-acetaminophen 5-325 MG Oral Tab          Sig: Take 1 tablet by mouth every 6 (six) hours as needed. LABS DUE!     Disp:  120 tablet    Refills:  0    Start: 10/19/2021    Earliest Fill Date: 10/19/2021    Class: Normal    Non-formulary For: El

## 2021-10-19 NOTE — TELEPHONE ENCOUNTER
Patient received zostavax vaccine 5/17/12    Shingrix #1 12/2020    Ok for second shingrix at this time?  Order pending if appropriate

## 2021-10-19 NOTE — TELEPHONE ENCOUNTER
Patient received first shingle vaccine last December, wondering if she can get the second vaccine now since she was not aware she was due in June or does she need to repeat the series    Please advise

## 2021-10-20 NOTE — TELEPHONE ENCOUNTER
Appt scheduled    Future Appointments   Date Time Provider Amelia Ng   10/28/2021 11:00 AM EMG 08 NURSE EMG 8 EM[facility] [place]

## 2021-10-21 DIAGNOSIS — E78.2 MIXED HYPERLIPIDEMIA: ICD-10-CM

## 2021-10-21 RX ORDER — ATORVASTATIN CALCIUM 10 MG/1
TABLET, FILM COATED ORAL
Qty: 90 TABLET | Refills: 0 | Status: SHIPPED | OUTPATIENT
Start: 2021-10-21 | End: 2021-11-18

## 2021-10-22 DIAGNOSIS — I10 ESSENTIAL HYPERTENSION: ICD-10-CM

## 2021-10-22 DIAGNOSIS — E11.9 DIABETES MELLITUS WITHOUT COMPLICATION (HCC): ICD-10-CM

## 2021-10-23 RX ORDER — TRIAMTERENE AND HYDROCHLOROTHIAZIDE 37.5; 25 MG/1; MG/1
1 TABLET ORAL DAILY
Qty: 90 TABLET | Refills: 0 | Status: SHIPPED | OUTPATIENT
Start: 2021-10-23 | End: 2021-12-29

## 2021-10-28 ENCOUNTER — NURSE ONLY (OUTPATIENT)
Dept: INTERNAL MEDICINE CLINIC | Facility: CLINIC | Age: 71
End: 2021-10-28
Payer: COMMERCIAL

## 2021-10-28 DIAGNOSIS — Z23 NEED FOR VACCINATION: Primary | ICD-10-CM

## 2021-10-28 PROCEDURE — 90471 IMMUNIZATION ADMIN: CPT | Performed by: FAMILY MEDICINE

## 2021-10-28 PROCEDURE — 90750 HZV VACC RECOMBINANT IM: CPT | Performed by: FAMILY MEDICINE

## 2021-10-29 ENCOUNTER — TELEPHONE (OUTPATIENT)
Dept: CARDIOLOGY | Age: 71
End: 2021-10-29

## 2021-10-29 DIAGNOSIS — I74.3 EMBOLISM AND THROMBOSIS OF ARTERIES OF LOWER EXTREMITY (CMD): Primary | ICD-10-CM

## 2021-11-08 ENCOUNTER — TELEPHONE (OUTPATIENT)
Dept: CARDIOLOGY | Age: 71
End: 2021-11-08

## 2021-11-17 ENCOUNTER — LAB ENCOUNTER (OUTPATIENT)
Dept: LAB | Age: 71
End: 2021-11-17
Attending: FAMILY MEDICINE
Payer: COMMERCIAL

## 2021-11-17 DIAGNOSIS — E78.2 MIXED HYPERLIPIDEMIA: ICD-10-CM

## 2021-11-17 DIAGNOSIS — E11.9 DIABETES MELLITUS WITHOUT COMPLICATION (HCC): ICD-10-CM

## 2021-11-17 DIAGNOSIS — I10 ESSENTIAL HYPERTENSION: ICD-10-CM

## 2021-11-17 PROCEDURE — 36415 COLL VENOUS BLD VENIPUNCTURE: CPT

## 2021-11-17 PROCEDURE — 3044F HG A1C LEVEL LT 7.0%: CPT | Performed by: FAMILY MEDICINE

## 2021-11-17 PROCEDURE — 80053 COMPREHEN METABOLIC PANEL: CPT

## 2021-11-17 PROCEDURE — 80061 LIPID PANEL: CPT

## 2021-11-17 PROCEDURE — 83036 HEMOGLOBIN GLYCOSYLATED A1C: CPT

## 2021-11-18 ENCOUNTER — TELEPHONE (OUTPATIENT)
Dept: INTERNAL MEDICINE CLINIC | Facility: CLINIC | Age: 71
End: 2021-11-18

## 2021-11-18 DIAGNOSIS — E78.2 MIXED HYPERLIPIDEMIA: ICD-10-CM

## 2021-11-18 DIAGNOSIS — M05.741 RHEUMATOID ARTHRITIS INVOLVING BOTH HANDS WITH POSITIVE RHEUMATOID FACTOR (HCC): ICD-10-CM

## 2021-11-18 DIAGNOSIS — M19.071 OSTEOARTHRITIS OF RIGHT ANKLE, UNSPECIFIED OSTEOARTHRITIS TYPE: ICD-10-CM

## 2021-11-18 DIAGNOSIS — R74.8 ELEVATED LIVER ENZYMES: Primary | ICD-10-CM

## 2021-11-18 DIAGNOSIS — M05.742 RHEUMATOID ARTHRITIS INVOLVING BOTH HANDS WITH POSITIVE RHEUMATOID FACTOR (HCC): ICD-10-CM

## 2021-11-18 RX ORDER — ATORVASTATIN CALCIUM 20 MG/1
20 TABLET, FILM COATED ORAL DAILY
Qty: 90 TABLET | Refills: 1 | Status: SHIPPED | OUTPATIENT
Start: 2021-11-18 | End: 2021-12-29

## 2021-11-18 RX ORDER — HYDROCODONE BITARTRATE AND ACETAMINOPHEN 5; 325 MG/1; MG/1
1 TABLET ORAL EVERY 6 HOURS PRN
Qty: 120 TABLET | Refills: 0 | Status: SHIPPED | OUTPATIENT
Start: 2021-11-18 | End: 2021-12-23

## 2021-11-18 NOTE — TELEPHONE ENCOUNTER
Patient requesting refill on Norco. Please advise    HYDROcodone-acetaminophen 5-325 MG Oral Tab    Last filled 10/19/21   120 tabs with 0 refills.      LOV 6/14/21

## 2021-11-18 NOTE — TELEPHONE ENCOUNTER
HYDROcodone-acetaminophen 5-325 MG Oral Tab          Sig: Take 1 tablet by mouth every 6 (six) hours as needed. LABS DUE!     Disp:  120 tablet    Refills:  0    Start: 11/18/2021    Earliest Fill Date: 11/18/2021    Class: Normal    For: Rheumatoid arthrit

## 2021-11-18 NOTE — TELEPHONE ENCOUNTER
----- Message from IVETTE Millre sent at 11/18/2021 11:55 AM CST -----  Glucose 137 and Hgba1c down a little. Pt to work on her diet and exercise. Trigs remain up, lets increase the Lipitor to 20 mg daily. Recheck lipids in 6 months.   ALT 59 which is

## 2021-12-03 ENCOUNTER — TELEPHONE (OUTPATIENT)
Dept: INTERNAL MEDICINE CLINIC | Facility: CLINIC | Age: 71
End: 2021-12-03

## 2021-12-03 NOTE — TELEPHONE ENCOUNTER
Pt called to schedule her cpx, pt stated she was advised to take vitamins because of where her liver enzyme levels were at. Pt has not started taking any and would like to know if Mario Baker still thinks she needs to and what vitamins she should be taking.  Pt i

## 2021-12-03 NOTE — TELEPHONE ENCOUNTER
Discussed test results with patient from 11/17/21 -- see result note. Patient seeking clarification on omega fatty acid. Per Brigida Davidson: \"A diet rich in omega fatty acids would also help. Recheck level again in one month. \"     Patient made aware of this and v

## 2021-12-13 DIAGNOSIS — I10 ESSENTIAL HYPERTENSION: ICD-10-CM

## 2021-12-13 DIAGNOSIS — F32.A DEPRESSION, UNSPECIFIED DEPRESSION TYPE: ICD-10-CM

## 2021-12-13 RX ORDER — FELODIPINE 10 MG/1
TABLET, EXTENDED RELEASE ORAL
Qty: 90 TABLET | Refills: 0 | OUTPATIENT
Start: 2021-12-13

## 2021-12-13 RX ORDER — FELODIPINE 10 MG/1
10 TABLET, EXTENDED RELEASE ORAL DAILY
Qty: 30 TABLET | Refills: 0 | Status: SHIPPED | OUTPATIENT
Start: 2021-12-13 | End: 2022-01-09

## 2021-12-13 RX ORDER — FLUOXETINE HYDROCHLORIDE 40 MG/1
40 CAPSULE ORAL DAILY
Qty: 30 CAPSULE | Refills: 0 | Status: SHIPPED | OUTPATIENT
Start: 2021-12-13 | End: 2022-01-09

## 2021-12-13 NOTE — TELEPHONE ENCOUNTER
Felodipine 10 mg  Filled 9-9-21  Qty 90  0 refills  Upcoming appt. 12-29-21  LOV 6-14-21    Fluoxetine HCL 40 mg  Filled 9-9-21  Qty 90  0 refills  Upcoming appt.  12-29-21  LOV 6-14-21

## 2021-12-20 ENCOUNTER — TELEPHONE (OUTPATIENT)
Dept: CARDIOLOGY | Age: 71
End: 2021-12-20

## 2021-12-20 DIAGNOSIS — M05.741 RHEUMATOID ARTHRITIS INVOLVING BOTH HANDS WITH POSITIVE RHEUMATOID FACTOR (HCC): ICD-10-CM

## 2021-12-20 DIAGNOSIS — M05.742 RHEUMATOID ARTHRITIS INVOLVING BOTH HANDS WITH POSITIVE RHEUMATOID FACTOR (HCC): ICD-10-CM

## 2021-12-20 DIAGNOSIS — M19.071 OSTEOARTHRITIS OF RIGHT ANKLE, UNSPECIFIED OSTEOARTHRITIS TYPE: ICD-10-CM

## 2021-12-20 RX ORDER — HYDROCODONE BITARTRATE AND ACETAMINOPHEN 5; 325 MG/1; MG/1
1 TABLET ORAL EVERY 6 HOURS PRN
Qty: 120 TABLET | Refills: 0 | OUTPATIENT
Start: 2021-12-20

## 2021-12-22 DIAGNOSIS — M05.741 RHEUMATOID ARTHRITIS INVOLVING BOTH HANDS WITH POSITIVE RHEUMATOID FACTOR (HCC): ICD-10-CM

## 2021-12-22 DIAGNOSIS — M19.071 OSTEOARTHRITIS OF RIGHT ANKLE, UNSPECIFIED OSTEOARTHRITIS TYPE: ICD-10-CM

## 2021-12-22 DIAGNOSIS — M05.742 RHEUMATOID ARTHRITIS INVOLVING BOTH HANDS WITH POSITIVE RHEUMATOID FACTOR (HCC): ICD-10-CM

## 2021-12-22 RX ORDER — HYDROCODONE BITARTRATE AND ACETAMINOPHEN 5; 325 MG/1; MG/1
1 TABLET ORAL EVERY 6 HOURS PRN
Qty: 120 TABLET | Refills: 0 | Status: CANCELLED | OUTPATIENT
Start: 2021-12-22

## 2021-12-22 NOTE — TELEPHONE ENCOUNTER
Appt scheduled. Future Appointments   Date Time Provider Amelia Ng   12/29/2021 11:00 AM IVETTE Kenyon EM[facility] 8 [facility] [place]     Pt states she is all out.

## 2021-12-23 RX ORDER — HYDROCODONE BITARTRATE AND ACETAMINOPHEN 5; 325 MG/1; MG/1
1 TABLET ORAL EVERY 6 HOURS PRN
Qty: 30 TABLET | Refills: 0 | Status: SHIPPED | OUTPATIENT
Start: 2021-12-23 | End: 2021-12-30

## 2021-12-23 RX ORDER — HYDROCODONE BITARTRATE AND ACETAMINOPHEN 5; 325 MG/1; MG/1
1 TABLET ORAL EVERY 6 HOURS PRN
Qty: 120 TABLET | Refills: 0 | OUTPATIENT
Start: 2021-12-23

## 2021-12-27 ENCOUNTER — TELEPHONE (OUTPATIENT)
Dept: CARDIOLOGY | Age: 71
End: 2021-12-27

## 2021-12-28 NOTE — PROGRESS NOTES
HPI:   Moise Gordon is a 73year old female who presents for a complete physical exam. Symptoms: denies discharge, itching, burning or dysuria, is menopausal.   Regular SBE- yes,Sexually active- occ,  Contraception- postmenopausal. STD history- none area x 1 month. It has been off and on. It mainly occurs at night when sitting or if she sits too long during the day. Pt ices it and it does feel better. Pt has a hx of OA and RA.  And feels it is her arthritis acting up because when she is up walking she 12/17/2020      Zoster Vaccine Live (Zostavax)                          05/17/2012      Zoster Vaccine Recombinant Adjuvanted (Shingrix)                          12/17/2020  10/28/2021    Wt Readings from Last 6 Encounters:  12/29/21 : 263 lb 12.8 oz (119. (10 mg total) by mouth daily. APPT DUE 30 tablet 0   • Nystatin [id number] UNIT/GM External Powder Apply 1 Application topically 4 (four) times daily.  60 g 1   • Warfarin Sodium (COUMADIN) 5 MG Oral Tab TAKE 1 TABLET BY MOUTH DAILY 90 tablet 0      Past Medica history,+DM  ALL/ASTHMA: denies hx of allergy or asthma    EXAM:   /84 (BP Location: Right arm, Patient Position: Sitting)   Pulse 83   Temp 98.7 °F (37.1 °C) (Oral)   Resp 16   Ht 5' 1\" (1.549 m)   Wt 263 lb 12.8 oz (119.7 kg)   SpO2 98%   BMI 49. 8 twice a day  Dispense: 200 strip; Refill: 1  - Microlet Lancets Does not apply Misc; 1 lancet by Finger stick route 2 (two) times daily. Dispense: 100 each; Refill: 1  - metFORMIN HCl 1000 MG Oral Tab;  Take 1 tablet (1,000 mg total) by mouth 2 (two) times glucose twice a day   • Microlet Lancets Does not apply Misc 100 each 1     Si lancet by Finger stick route 2 (two) times daily. • Triamterene-HCTZ 37.5-25 MG Oral Tab 90 tablet 1     Sig: Take 1 tablet by mouth daily.    • metFORMIN HCl 1000 MG Oral

## 2021-12-29 ENCOUNTER — OFFICE VISIT (OUTPATIENT)
Dept: INTERNAL MEDICINE CLINIC | Facility: CLINIC | Age: 71
End: 2021-12-29
Payer: COMMERCIAL

## 2021-12-29 ENCOUNTER — LAB ENCOUNTER (OUTPATIENT)
Dept: LAB | Age: 71
End: 2021-12-29
Attending: FAMILY MEDICINE
Payer: COMMERCIAL

## 2021-12-29 VITALS
HEART RATE: 83 BPM | WEIGHT: 263.81 LBS | BODY MASS INDEX: 49.81 KG/M2 | DIASTOLIC BLOOD PRESSURE: 84 MMHG | TEMPERATURE: 99 F | HEIGHT: 61 IN | RESPIRATION RATE: 16 BRPM | OXYGEN SATURATION: 98 % | SYSTOLIC BLOOD PRESSURE: 130 MMHG

## 2021-12-29 DIAGNOSIS — Z00.00 ROUTINE GENERAL MEDICAL EXAMINATION AT A HEALTH CARE FACILITY: Primary | ICD-10-CM

## 2021-12-29 DIAGNOSIS — Z12.11 ENCOUNTER FOR SCREENING FECAL OCCULT BLOOD TESTING: ICD-10-CM

## 2021-12-29 DIAGNOSIS — R74.8 ELEVATED LIVER ENZYMES: ICD-10-CM

## 2021-12-29 DIAGNOSIS — I10 ESSENTIAL HYPERTENSION: ICD-10-CM

## 2021-12-29 DIAGNOSIS — M79.18 RIGHT BUTTOCK PAIN: ICD-10-CM

## 2021-12-29 DIAGNOSIS — E11.65 TYPE 2 DIABETES MELLITUS WITH HYPERGLYCEMIA, WITHOUT LONG-TERM CURRENT USE OF INSULIN (HCC): ICD-10-CM

## 2021-12-29 DIAGNOSIS — E78.2 MIXED HYPERLIPIDEMIA: ICD-10-CM

## 2021-12-29 PROCEDURE — 3075F SYST BP GE 130 - 139MM HG: CPT | Performed by: FAMILY MEDICINE

## 2021-12-29 PROCEDURE — 3079F DIAST BP 80-89 MM HG: CPT | Performed by: FAMILY MEDICINE

## 2021-12-29 PROCEDURE — 3008F BODY MASS INDEX DOCD: CPT | Performed by: FAMILY MEDICINE

## 2021-12-29 PROCEDURE — 82570 ASSAY OF URINE CREATININE: CPT | Performed by: FAMILY MEDICINE

## 2021-12-29 PROCEDURE — 99213 OFFICE O/P EST LOW 20 MIN: CPT | Performed by: FAMILY MEDICINE

## 2021-12-29 PROCEDURE — 99397 PER PM REEVAL EST PAT 65+ YR: CPT | Performed by: FAMILY MEDICINE

## 2021-12-29 PROCEDURE — 82043 UR ALBUMIN QUANTITATIVE: CPT | Performed by: FAMILY MEDICINE

## 2021-12-29 PROCEDURE — 80053 COMPREHEN METABOLIC PANEL: CPT | Performed by: FAMILY MEDICINE

## 2021-12-29 RX ORDER — LANCETS
1 EACH MISCELLANEOUS 2 TIMES DAILY
Qty: 100 EACH | Refills: 1 | Status: SHIPPED | OUTPATIENT
Start: 2021-12-29

## 2021-12-29 RX ORDER — TRIAMTERENE AND HYDROCHLOROTHIAZIDE 37.5; 25 MG/1; MG/1
1 TABLET ORAL DAILY
Qty: 90 TABLET | Refills: 1 | Status: SHIPPED | OUTPATIENT
Start: 2021-12-29 | End: 2022-03-29

## 2021-12-29 RX ORDER — BLOOD SUGAR DIAGNOSTIC
STRIP MISCELLANEOUS
Qty: 200 STRIP | Refills: 1 | Status: SHIPPED | OUTPATIENT
Start: 2021-12-29

## 2021-12-29 RX ORDER — ATORVASTATIN CALCIUM 20 MG/1
20 TABLET, FILM COATED ORAL DAILY
Qty: 90 TABLET | Refills: 1 | Status: SHIPPED | OUTPATIENT
Start: 2021-12-29

## 2021-12-30 DIAGNOSIS — M19.071 OSTEOARTHRITIS OF RIGHT ANKLE, UNSPECIFIED OSTEOARTHRITIS TYPE: ICD-10-CM

## 2021-12-30 DIAGNOSIS — M05.741 RHEUMATOID ARTHRITIS INVOLVING BOTH HANDS WITH POSITIVE RHEUMATOID FACTOR (HCC): ICD-10-CM

## 2021-12-30 DIAGNOSIS — M05.742 RHEUMATOID ARTHRITIS INVOLVING BOTH HANDS WITH POSITIVE RHEUMATOID FACTOR (HCC): ICD-10-CM

## 2021-12-30 RX ORDER — HYDROCODONE BITARTRATE AND ACETAMINOPHEN 5; 325 MG/1; MG/1
1 TABLET ORAL EVERY 6 HOURS PRN
Qty: 120 TABLET | Refills: 0 | Status: SHIPPED | OUTPATIENT
Start: 2021-12-30 | End: 2022-02-02

## 2021-12-30 NOTE — TELEPHONE ENCOUNTER
Pt called requesting refill:  Pt states she got 30 tabs before the holiday because she was due for an appt. Pt states she has 3 days worth left and would like the refill as soon as we can since the office will be closed. Please advise.      HYDROcodone-acetaminophen 5-325 MG Oral Tab (120 tabs)      [facility] #[id number] - [place], [place] - [address] AT [address] [place] [address], [phone], [phone]

## 2022-01-05 ENCOUNTER — LAB SERVICES (OUTPATIENT)
Dept: LAB | Age: 72
End: 2022-01-05

## 2022-01-05 ENCOUNTER — ANTI-COAG (OUTPATIENT)
Dept: CARDIOLOGY | Age: 72
End: 2022-01-05

## 2022-01-05 DIAGNOSIS — Z79.01 LONG TERM (CURRENT) USE OF ANTICOAGULANTS: ICD-10-CM

## 2022-01-05 DIAGNOSIS — I74.3 EMBOLISM AND THROMBOSIS OF ARTERIES OF LOWER EXTREMITY (CMD): Primary | ICD-10-CM

## 2022-01-05 DIAGNOSIS — I74.3 EMBOLISM AND THROMBOSIS OF ARTERIES OF LOWER EXTREMITY (CMD): ICD-10-CM

## 2022-01-05 LAB — INR BLDC: 2.4

## 2022-01-05 PROCEDURE — 85610 PROTHROMBIN TIME: CPT | Performed by: PSYCHIATRY & NEUROLOGY

## 2022-01-05 PROCEDURE — 36416 COLLJ CAPILLARY BLOOD SPEC: CPT | Performed by: PSYCHIATRY & NEUROLOGY

## 2022-01-05 RX ORDER — WARFARIN SODIUM 5 MG/1
TABLET ORAL
Qty: 30 TABLET | Refills: 0 | Status: SHIPPED | OUTPATIENT
Start: 2022-01-05 | End: 2023-03-14 | Stop reason: SDUPTHER

## 2022-01-06 ENCOUNTER — TELEPHONE (OUTPATIENT)
Dept: CARDIOLOGY | Age: 72
End: 2022-01-06

## 2022-01-09 ENCOUNTER — PATIENT MESSAGE (OUTPATIENT)
Dept: INTERNAL MEDICINE CLINIC | Facility: CLINIC | Age: 72
End: 2022-01-09

## 2022-01-09 DIAGNOSIS — I10 ESSENTIAL HYPERTENSION: ICD-10-CM

## 2022-01-09 DIAGNOSIS — F32.A DEPRESSION, UNSPECIFIED DEPRESSION TYPE: ICD-10-CM

## 2022-01-10 RX ORDER — WARFARIN SODIUM 5 MG/1
TABLET ORAL
Qty: 71 TABLET | OUTPATIENT
Start: 2022-01-10

## 2022-01-10 RX ORDER — FELODIPINE 10 MG/1
10 TABLET, EXTENDED RELEASE ORAL DAILY
Qty: 90 TABLET | Refills: 1 | Status: SHIPPED | OUTPATIENT
Start: 2022-01-10 | End: 2022-07-12

## 2022-01-10 RX ORDER — FLUOXETINE HYDROCHLORIDE 40 MG/1
40 CAPSULE ORAL DAILY
Qty: 90 CAPSULE | Refills: 1 | Status: SHIPPED | OUTPATIENT
Start: 2022-01-10 | End: 2022-07-12

## 2022-01-10 NOTE — TELEPHONE ENCOUNTER
From: William Gallagher  To: IVETTE Katz  Sent: 1/9/2022 1:36 PM CST  Subject: The two prescriptions l ordered today on my chart    Both quantities need to be changed to 90 count. thx

## 2022-02-02 RX ORDER — HYDROCODONE BITARTRATE AND ACETAMINOPHEN 5; 325 MG/1; MG/1
1 TABLET ORAL EVERY 6 HOURS PRN
Qty: 120 TABLET | Refills: 0 | Status: SHIPPED | OUTPATIENT
Start: 2022-02-02 | End: 2022-03-02

## 2022-02-09 ENCOUNTER — TELEPHONE (OUTPATIENT)
Dept: CARDIOLOGY | Age: 72
End: 2022-02-09

## 2022-02-11 ENCOUNTER — ANTI-COAG (OUTPATIENT)
Dept: CARDIOLOGY | Age: 72
End: 2022-02-11

## 2022-02-11 ENCOUNTER — LAB SERVICES (OUTPATIENT)
Dept: LAB | Age: 72
End: 2022-02-11

## 2022-02-11 DIAGNOSIS — I74.3 EMBOLISM AND THROMBOSIS OF ARTERIES OF LOWER EXTREMITY (CMD): ICD-10-CM

## 2022-02-11 DIAGNOSIS — I74.3 EMBOLISM AND THROMBOSIS OF ARTERIES OF LOWER EXTREMITY (CMD): Primary | ICD-10-CM

## 2022-02-11 DIAGNOSIS — Z79.01 LONG TERM (CURRENT) USE OF ANTICOAGULANTS: ICD-10-CM

## 2022-02-11 LAB — INR BLDC: 3.1

## 2022-02-11 PROCEDURE — 36416 COLLJ CAPILLARY BLOOD SPEC: CPT | Performed by: PSYCHIATRY & NEUROLOGY

## 2022-02-11 PROCEDURE — 85610 PROTHROMBIN TIME: CPT | Performed by: PSYCHIATRY & NEUROLOGY

## 2022-02-11 RX ORDER — WARFARIN SODIUM 5 MG/1
2.5-5 TABLET ORAL DAILY
Qty: 90 TABLET | Refills: 1 | Status: SHIPPED | OUTPATIENT
Start: 2022-02-11 | End: 2023-10-03 | Stop reason: SDUPTHER

## 2022-02-28 ENCOUNTER — TELEPHONE (OUTPATIENT)
Dept: CARDIOLOGY | Age: 72
End: 2022-02-28

## 2022-03-03 RX ORDER — HYDROCODONE BITARTRATE AND ACETAMINOPHEN 5; 325 MG/1; MG/1
1 TABLET ORAL EVERY 6 HOURS PRN
Qty: 120 TABLET | Refills: 0 | Status: SHIPPED | OUTPATIENT
Start: 2022-03-03 | End: 2022-04-04

## 2022-03-07 ENCOUNTER — ANTI-COAG (OUTPATIENT)
Dept: CARDIOLOGY | Age: 72
End: 2022-03-07

## 2022-03-07 DIAGNOSIS — I74.3 EMBOLISM AND THROMBOSIS OF ARTERIES OF LOWER EXTREMITY (CMD): Primary | ICD-10-CM

## 2022-03-15 ENCOUNTER — ANTI-COAG (OUTPATIENT)
Dept: CARDIOLOGY | Age: 72
End: 2022-03-15

## 2022-03-15 DIAGNOSIS — I74.3 EMBOLISM AND THROMBOSIS OF ARTERIES OF LOWER EXTREMITY (CMD): Primary | ICD-10-CM

## 2022-03-28 ENCOUNTER — APPOINTMENT (OUTPATIENT)
Dept: CARDIOLOGY | Age: 72
End: 2022-03-28

## 2022-03-30 ENCOUNTER — ANTI-COAG (OUTPATIENT)
Dept: CARDIOLOGY | Age: 72
End: 2022-03-30

## 2022-03-30 ENCOUNTER — TELEPHONE (OUTPATIENT)
Dept: CARDIOLOGY | Age: 72
End: 2022-03-30

## 2022-03-30 DIAGNOSIS — I74.3 EMBOLISM AND THROMBOSIS OF ARTERIES OF LOWER EXTREMITY (CMD): Primary | ICD-10-CM

## 2022-04-04 ENCOUNTER — APPOINTMENT (OUTPATIENT)
Dept: CARDIOLOGY | Age: 72
End: 2022-04-04

## 2022-04-04 RX ORDER — HYDROCODONE BITARTRATE AND ACETAMINOPHEN 5; 325 MG/1; MG/1
1 TABLET ORAL EVERY 6 HOURS PRN
Qty: 120 TABLET | Refills: 0 | Status: SHIPPED | OUTPATIENT
Start: 2022-04-04

## 2022-04-11 RX ORDER — FELODIPINE 10 MG/1
TABLET, EXTENDED RELEASE ORAL
Qty: 90 TABLET | Refills: 1 | OUTPATIENT
Start: 2022-04-11

## 2022-04-11 RX ORDER — FELODIPINE 10 MG/1
10 TABLET, EXTENDED RELEASE ORAL DAILY
Qty: 90 TABLET | Refills: 1 | OUTPATIENT
Start: 2022-04-11

## 2022-04-11 RX ORDER — FLUOXETINE HYDROCHLORIDE 40 MG/1
CAPSULE ORAL
Qty: 90 CAPSULE | Refills: 1 | OUTPATIENT
Start: 2022-04-11

## 2022-04-11 RX ORDER — FLUOXETINE HYDROCHLORIDE 40 MG/1
40 CAPSULE ORAL DAILY
Qty: 90 CAPSULE | Refills: 1 | OUTPATIENT
Start: 2022-04-11

## 2022-04-11 NOTE — TELEPHONE ENCOUNTER
Fluoxetine HCl 40 mg- to early for refill  Filled 1-10-22  Qty 90  1 refill    Felodipine 10 mg- to early for refill  Filled 1-10-22  Qty 90  1 refill

## 2022-04-13 ENCOUNTER — OFFICE VISIT (OUTPATIENT)
Dept: CARDIOLOGY | Age: 72
End: 2022-04-13

## 2022-04-13 VITALS
SYSTOLIC BLOOD PRESSURE: 142 MMHG | DIASTOLIC BLOOD PRESSURE: 66 MMHG | HEIGHT: 62 IN | WEIGHT: 266 LBS | RESPIRATION RATE: 16 BRPM | BODY MASS INDEX: 48.95 KG/M2 | HEART RATE: 76 BPM

## 2022-04-13 DIAGNOSIS — E78.00 PURE HYPERCHOLESTEROLEMIA: Primary | ICD-10-CM

## 2022-04-13 DIAGNOSIS — Z79.01 LONG TERM (CURRENT) USE OF ANTICOAGULANTS: ICD-10-CM

## 2022-04-13 DIAGNOSIS — G47.33 OSA (OBSTRUCTIVE SLEEP APNEA): ICD-10-CM

## 2022-04-13 DIAGNOSIS — I70.209 ATHEROSCLEROSIS OF ARTERIES OF EXTREMITIES (CMD): ICD-10-CM

## 2022-04-13 DIAGNOSIS — I10 ESSENTIAL HYPERTENSION: ICD-10-CM

## 2022-04-13 PROCEDURE — 3077F SYST BP >= 140 MM HG: CPT | Performed by: INTERNAL MEDICINE

## 2022-04-13 PROCEDURE — 99214 OFFICE O/P EST MOD 30 MIN: CPT | Performed by: INTERNAL MEDICINE

## 2022-04-13 PROCEDURE — 3078F DIAST BP <80 MM HG: CPT | Performed by: INTERNAL MEDICINE

## 2022-04-13 ASSESSMENT — PATIENT HEALTH QUESTIONNAIRE - PHQ9
SUM OF ALL RESPONSES TO PHQ9 QUESTIONS 1 AND 2: 0
1. LITTLE INTEREST OR PLEASURE IN DOING THINGS: NOT AT ALL
2. FEELING DOWN, DEPRESSED OR HOPELESS: NOT AT ALL
CLINICAL INTERPRETATION OF PHQ2 SCORE: NO FURTHER SCREENING NEEDED
SUM OF ALL RESPONSES TO PHQ9 QUESTIONS 1 AND 2: 0

## 2022-04-14 ENCOUNTER — TELEPHONE (OUTPATIENT)
Dept: INTERNAL MEDICINE CLINIC | Facility: CLINIC | Age: 72
End: 2022-04-14

## 2022-04-14 NOTE — TELEPHONE ENCOUNTER
Incoming fax from [address] [facility] [facility]     Patients OV notes from 4/13/2022     Placed in  in basket

## 2022-04-28 ENCOUNTER — TELEPHONE (OUTPATIENT)
Dept: CARDIOLOGY | Age: 72
End: 2022-04-28

## 2022-04-28 ENCOUNTER — ANTI-COAG (OUTPATIENT)
Dept: CARDIOLOGY | Age: 72
End: 2022-04-28

## 2022-04-28 DIAGNOSIS — I74.3 EMBOLISM AND THROMBOSIS OF ARTERIES OF LOWER EXTREMITY (CMD): Primary | ICD-10-CM

## 2022-05-04 RX ORDER — HYDROCODONE BITARTRATE AND ACETAMINOPHEN 5; 325 MG/1; MG/1
1 TABLET ORAL EVERY 6 HOURS PRN
Qty: 120 TABLET | Refills: 0 | Status: SHIPPED | OUTPATIENT
Start: 2022-05-04

## 2022-05-27 ENCOUNTER — TELEPHONE (OUTPATIENT)
Dept: CARDIOLOGY | Age: 72
End: 2022-05-27

## 2022-05-27 ENCOUNTER — ANTI-COAG (OUTPATIENT)
Dept: CARDIOLOGY | Age: 72
End: 2022-05-27

## 2022-05-27 DIAGNOSIS — I74.3 EMBOLISM AND THROMBOSIS OF ARTERIES OF LOWER EXTREMITY (CMD): Primary | ICD-10-CM

## 2022-06-05 DIAGNOSIS — M19.071 OSTEOARTHRITIS OF RIGHT ANKLE, UNSPECIFIED OSTEOARTHRITIS TYPE: ICD-10-CM

## 2022-06-05 DIAGNOSIS — E78.2 MIXED HYPERLIPIDEMIA: ICD-10-CM

## 2022-06-05 DIAGNOSIS — M05.741 RHEUMATOID ARTHRITIS INVOLVING BOTH HANDS WITH POSITIVE RHEUMATOID FACTOR (HCC): ICD-10-CM

## 2022-06-05 DIAGNOSIS — M05.742 RHEUMATOID ARTHRITIS INVOLVING BOTH HANDS WITH POSITIVE RHEUMATOID FACTOR (HCC): ICD-10-CM

## 2022-06-06 RX ORDER — HYDROCODONE BITARTRATE AND ACETAMINOPHEN 5; 325 MG/1; MG/1
1 TABLET ORAL EVERY 6 HOURS PRN
Qty: 120 TABLET | Refills: 0 | OUTPATIENT
Start: 2022-06-06

## 2022-06-06 RX ORDER — ATORVASTATIN CALCIUM 20 MG/1
20 TABLET, FILM COATED ORAL DAILY
Qty: 90 TABLET | Refills: 0 | Status: SHIPPED | OUTPATIENT
Start: 2022-06-06

## 2022-06-06 NOTE — TELEPHONE ENCOUNTER
Atorvastatin 20 mg  Filled 12-29-21  Qty 90  1 refill  No upcoming appt. LOV 12-29-21    Hydrocodone-acetaminophen 5-325 mg  Filled 5-4-22  Qty 120  0 refills  No upcoming appt.    LOV 12-29-21

## 2022-06-07 DIAGNOSIS — M05.741 RHEUMATOID ARTHRITIS INVOLVING BOTH HANDS WITH POSITIVE RHEUMATOID FACTOR (HCC): ICD-10-CM

## 2022-06-07 DIAGNOSIS — M19.071 OSTEOARTHRITIS OF RIGHT ANKLE, UNSPECIFIED OSTEOARTHRITIS TYPE: ICD-10-CM

## 2022-06-07 DIAGNOSIS — M05.742 RHEUMATOID ARTHRITIS INVOLVING BOTH HANDS WITH POSITIVE RHEUMATOID FACTOR (HCC): ICD-10-CM

## 2022-06-07 RX ORDER — HYDROCODONE BITARTRATE AND ACETAMINOPHEN 5; 325 MG/1; MG/1
1 TABLET ORAL EVERY 6 HOURS PRN
Qty: 120 TABLET | Refills: 0 | Status: SHIPPED | OUTPATIENT
Start: 2022-06-07

## 2022-06-07 NOTE — TELEPHONE ENCOUNTER
Pt called in to confirm her upcoming appointment for medication follow up. She questioned if her medication will be renewed now and I advised pt it will be up to Cecilia to determine. Pt verbalized understanding.

## 2022-06-09 ENCOUNTER — LAB SERVICES (OUTPATIENT)
Dept: LAB | Age: 72
End: 2022-06-09

## 2022-06-09 ENCOUNTER — ANTI-COAG (OUTPATIENT)
Dept: CARDIOLOGY | Age: 72
End: 2022-06-09

## 2022-06-09 DIAGNOSIS — Z79.01 LONG TERM (CURRENT) USE OF ANTICOAGULANTS: ICD-10-CM

## 2022-06-09 DIAGNOSIS — I74.3 EMBOLISM AND THROMBOSIS OF ARTERIES OF LOWER EXTREMITY (CMD): ICD-10-CM

## 2022-06-09 DIAGNOSIS — I74.3 EMBOLISM AND THROMBOSIS OF ARTERIES OF LOWER EXTREMITY (CMD): Primary | ICD-10-CM

## 2022-06-09 LAB
INR BLDC: 1.7
INR PPP: 1.7

## 2022-06-09 PROCEDURE — 36416 COLLJ CAPILLARY BLOOD SPEC: CPT | Performed by: INTERNAL MEDICINE

## 2022-06-09 PROCEDURE — 85610 PROTHROMBIN TIME: CPT | Performed by: INTERNAL MEDICINE

## 2022-06-24 ENCOUNTER — ANTI-COAG (OUTPATIENT)
Dept: CARDIOLOGY | Age: 72
End: 2022-06-24

## 2022-06-24 DIAGNOSIS — I74.3 EMBOLISM AND THROMBOSIS OF ARTERIES OF LOWER EXTREMITY (CMD): Primary | ICD-10-CM

## 2022-06-30 ENCOUNTER — ANTI-COAG (OUTPATIENT)
Dept: CARDIOLOGY | Age: 72
End: 2022-06-30

## 2022-06-30 DIAGNOSIS — I74.3 EMBOLISM AND THROMBOSIS OF ARTERIES OF LOWER EXTREMITY (CMD): Primary | ICD-10-CM

## 2022-07-06 ENCOUNTER — ANTI-COAG (OUTPATIENT)
Dept: CARDIOLOGY | Age: 72
End: 2022-07-06

## 2022-07-06 DIAGNOSIS — I74.3 EMBOLISM AND THROMBOSIS OF ARTERIES OF LOWER EXTREMITY (CMD): Primary | ICD-10-CM

## 2022-07-12 ENCOUNTER — OFFICE VISIT (OUTPATIENT)
Dept: INTERNAL MEDICINE CLINIC | Facility: CLINIC | Age: 72
End: 2022-07-12
Payer: COMMERCIAL

## 2022-07-12 ENCOUNTER — LAB ENCOUNTER (OUTPATIENT)
Dept: LAB | Age: 72
End: 2022-07-12
Attending: FAMILY MEDICINE
Payer: COMMERCIAL

## 2022-07-12 VITALS
HEART RATE: 86 BPM | TEMPERATURE: 99 F | SYSTOLIC BLOOD PRESSURE: 130 MMHG | HEIGHT: 61 IN | RESPIRATION RATE: 20 BRPM | BODY MASS INDEX: 51.16 KG/M2 | DIASTOLIC BLOOD PRESSURE: 72 MMHG | WEIGHT: 271 LBS

## 2022-07-12 DIAGNOSIS — E11.65 TYPE 2 DIABETES MELLITUS WITH HYPERGLYCEMIA, WITHOUT LONG-TERM CURRENT USE OF INSULIN (HCC): ICD-10-CM

## 2022-07-12 DIAGNOSIS — L30.4 INTERTRIGO: ICD-10-CM
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
## 2022-07-12 DIAGNOSIS — F32.A DEPRESSION, UNSPECIFIED DEPRESSION TYPE: ICD-10-CM

## 2022-07-12 DIAGNOSIS — I74.3 EMBOLISM AND THROMBOSIS OF ARTERIES OF LOWER EXTREMITIES (HCC): ICD-10-CM

## 2022-07-12 DIAGNOSIS — E78.2 MIXED HYPERLIPIDEMIA: ICD-10-CM

## 2022-07-12 DIAGNOSIS — Z12.31 ENCOUNTER FOR SCREENING MAMMOGRAM FOR BREAST CANCER: Primary | ICD-10-CM

## 2022-07-12 DIAGNOSIS — Z78.0 POSTMENOPAUSAL ESTROGEN DEFICIENCY: ICD-10-CM

## 2022-07-12 DIAGNOSIS — I10 ESSENTIAL HYPERTENSION: ICD-10-CM

## 2022-07-12 LAB
ALBUMIN SERPL-MCNC: 3.9 G/DL (ref 3.4–5)
ALBUMIN/GLOB SERPL: 1 {RATIO} (ref 1–2)
ALP LIVER SERPL-CCNC: 81 U/L
ALT SERPL-CCNC: 53 U/L
ANION GAP SERPL CALC-SCNC: 9 MMOL/L (ref 0–18)
AST SERPL-CCNC: 35 U/L (ref 15–37)
BILIRUB SERPL-MCNC: 1.1 MG/DL (ref 0.1–2)
BUN BLD-MCNC: 14 MG/DL (ref 7–18)
CALCIUM BLD-MCNC: 9.8 MG/DL (ref 8.5–10.1)
CHLORIDE SERPL-SCNC: 102 MMOL/L (ref 98–112)
CHOLEST SERPL-MCNC: 136 MG/DL (ref ?–200)
CO2 SERPL-SCNC: 27 MMOL/L (ref 21–32)
CREAT BLD-MCNC: 0.81 MG/DL
EST. AVERAGE GLUCOSE BLD GHB EST-MCNC: 146 MG/DL (ref 68–126)
FASTING PATIENT LIPID ANSWER: YES
FASTING STATUS PATIENT QL REPORTED: YES
GLOBULIN PLAS-MCNC: 4 G/DL (ref 2.8–4.4)
GLUCOSE BLD-MCNC: 152 MG/DL (ref 70–99)
HBA1C MFR BLD: 6.7 % (ref ?–5.7)
HDLC SERPL-MCNC: 61 MG/DL (ref 40–59)
INR BLD: 1.57 (ref 0.8–1.2)
INR PPP: 1.57
LDLC SERPL CALC-MCNC: 47 MG/DL (ref ?–100)
NONHDLC SERPL-MCNC: 75 MG/DL (ref ?–130)
OSMOLALITY SERPL CALC.SUM OF ELEC: 289 MOSM/KG (ref 275–295)
POTASSIUM SERPL-SCNC: 3.8 MMOL/L (ref 3.5–5.1)
PROT SERPL-MCNC: 7.9 G/DL (ref 6.4–8.2)
PROTHROMBIN TIME: 18.9 SECONDS (ref 11.6–14.8)
SODIUM SERPL-SCNC: 138 MMOL/L (ref 136–145)
TRIGL SERPL-MCNC: 168 MG/DL (ref 30–149)
VLDLC SERPL CALC-MCNC: 24 MG/DL (ref 0–30)

## 2022-07-12 PROCEDURE — 85610 PROTHROMBIN TIME: CPT | Performed by: FAMILY MEDICINE

## 2022-07-12 PROCEDURE — 3008F BODY MASS INDEX DOCD: CPT | Performed by: FAMILY MEDICINE

## 2022-07-12 PROCEDURE — 80053 COMPREHEN METABOLIC PANEL: CPT | Performed by: FAMILY MEDICINE

## 2022-07-12 PROCEDURE — 3078F DIAST BP <80 MM HG: CPT | Performed by: FAMILY MEDICINE

## 2022-07-12 PROCEDURE — 3075F SYST BP GE 130 - 139MM HG: CPT | Performed by: FAMILY MEDICINE

## 2022-07-12 PROCEDURE — 99214 OFFICE O/P EST MOD 30 MIN: CPT | Performed by: FAMILY MEDICINE

## 2022-07-12 PROCEDURE — 83036 HEMOGLOBIN GLYCOSYLATED A1C: CPT | Performed by: FAMILY MEDICINE

## 2022-07-12 PROCEDURE — 80061 LIPID PANEL: CPT | Performed by: FAMILY MEDICINE

## 2022-07-12 RX ORDER — FELODIPINE 10 MG/1
10 TABLET, EXTENDED RELEASE ORAL DAILY
Qty: 90 TABLET | Refills: 1 | Status: SHIPPED | OUTPATIENT
Start: 2022-07-12

## 2022-07-12 RX ORDER — ATORVASTATIN CALCIUM 20 MG/1
20 TABLET, FILM COATED ORAL DAILY
Qty: 90 TABLET | Refills: 0 | Status: SHIPPED | OUTPATIENT
Start: 2022-07-12

## 2022-07-12 RX ORDER — TRIAMTERENE AND HYDROCHLOROTHIAZIDE 37.5; 25 MG/1; MG/1
1 TABLET ORAL DAILY
COMMUNITY
Start: 2022-04-24 | End: 2022-07-12

## 2022-07-12 RX ORDER — LANCETS
1 EACH MISCELLANEOUS 2 TIMES DAILY
Qty: 100 EACH | Refills: 1 | Status: SHIPPED | OUTPATIENT
Start: 2022-07-12

## 2022-07-12 RX ORDER — TRIAMTERENE AND HYDROCHLOROTHIAZIDE 37.5; 25 MG/1; MG/1
1 TABLET ORAL DAILY
Qty: 90 TABLET | Refills: 1 | Status: SHIPPED | OUTPATIENT
Start: 2022-07-12

## 2022-07-12 RX ORDER — NYSTATIN 100000 [USP'U]/G
1 POWDER TOPICAL 4 TIMES DAILY
Qty: 60 G | Refills: 1 | Status: SHIPPED | OUTPATIENT
Start: 2022-07-12

## 2022-07-12 RX ORDER — BLOOD SUGAR DIAGNOSTIC
STRIP MISCELLANEOUS
Qty: 200 STRIP | Refills: 1 | Status: SHIPPED | OUTPATIENT
Start: 2022-07-12

## 2022-07-12 RX ORDER — FLUOXETINE HYDROCHLORIDE 40 MG/1
40 CAPSULE ORAL DAILY
Qty: 90 CAPSULE | Refills: 1 | Status: SHIPPED | OUTPATIENT
Start: 2022-07-12

## 2022-07-13 ENCOUNTER — ANTI-COAG (OUTPATIENT)
Dept: CARDIOLOGY | Age: 72
End: 2022-07-13

## 2022-07-13 ENCOUNTER — TELEPHONE (OUTPATIENT)
Dept: INTERNAL MEDICINE CLINIC | Facility: CLINIC | Age: 72
End: 2022-07-13

## 2022-07-13 ENCOUNTER — TELEPHONE (OUTPATIENT)
Dept: CARDIOLOGY | Age: 72
End: 2022-07-13

## 2022-07-13 DIAGNOSIS — E78.2 MIXED HYPERLIPIDEMIA: ICD-10-CM

## 2022-07-13 DIAGNOSIS — E11.65 TYPE 2 DIABETES MELLITUS WITH HYPERGLYCEMIA, WITHOUT LONG-TERM CURRENT USE OF INSULIN (HCC): Primary | ICD-10-CM

## 2022-07-13 DIAGNOSIS — I74.3 EMBOLISM AND THROMBOSIS OF ARTERIES OF LOWER EXTREMITY (CMD): Primary | ICD-10-CM

## 2022-07-13 NOTE — TELEPHONE ENCOUNTER
Spoke with pt and relayed results and recs. Faxed PT/INR to Dr. Alexander quinteros. They were already aware of pt's low PT/INR. Fax # [phone]    SM: future labs pended. Did you just want lipids and hgb A1C?  Sign if approve

## 2022-07-14 DIAGNOSIS — M19.071 OSTEOARTHRITIS OF RIGHT ANKLE, UNSPECIFIED OSTEOARTHRITIS TYPE: ICD-10-CM

## 2022-07-14 DIAGNOSIS — M05.741 RHEUMATOID ARTHRITIS INVOLVING BOTH HANDS WITH POSITIVE RHEUMATOID FACTOR (HCC): ICD-10-CM

## 2022-07-14 DIAGNOSIS — M05.742 RHEUMATOID ARTHRITIS INVOLVING BOTH HANDS WITH POSITIVE RHEUMATOID FACTOR (HCC): ICD-10-CM

## 2022-07-14 RX ORDER — HYDROCODONE BITARTRATE AND ACETAMINOPHEN 5; 325 MG/1; MG/1
1 TABLET ORAL EVERY 6 HOURS PRN
Qty: 120 TABLET | Refills: 0 | Status: SHIPPED | OUTPATIENT
Start: 2022-07-14

## 2022-07-21 ENCOUNTER — ANTI-COAG (OUTPATIENT)
Dept: CARDIOLOGY | Age: 72
End: 2022-07-21

## 2022-07-21 DIAGNOSIS — I74.3 EMBOLISM AND THROMBOSIS OF ARTERIES OF LOWER EXTREMITY (CMD): Primary | ICD-10-CM

## 2022-07-27 ENCOUNTER — ANTI-COAG (OUTPATIENT)
Dept: CARDIOLOGY | Age: 72
End: 2022-07-27

## 2022-07-27 DIAGNOSIS — I74.3 EMBOLISM AND THROMBOSIS OF ARTERIES OF LOWER EXTREMITY (CMD): Primary | ICD-10-CM

## 2022-07-29 ENCOUNTER — LAB SERVICES (OUTPATIENT)
Dept: LAB | Age: 72
End: 2022-07-29

## 2022-07-29 ENCOUNTER — TELEPHONE (OUTPATIENT)
Dept: CARDIOLOGY | Age: 72
End: 2022-07-29

## 2022-07-29 ENCOUNTER — ANTI-COAG (OUTPATIENT)
Dept: CARDIOLOGY | Age: 72
End: 2022-07-29

## 2022-07-29 DIAGNOSIS — I74.3 EMBOLISM AND THROMBOSIS OF ARTERIES OF LOWER EXTREMITY (CMD): ICD-10-CM

## 2022-07-29 DIAGNOSIS — Z79.01 LONG TERM (CURRENT) USE OF ANTICOAGULANTS: ICD-10-CM

## 2022-07-29 DIAGNOSIS — I74.3 EMBOLISM AND THROMBOSIS OF ARTERIES OF LOWER EXTREMITY (CMD): Primary | ICD-10-CM

## 2022-07-29 LAB — INR BLDC: 2.2

## 2022-07-29 PROCEDURE — 36416 COLLJ CAPILLARY BLOOD SPEC: CPT | Performed by: INTERNAL MEDICINE

## 2022-07-29 PROCEDURE — 85610 PROTHROMBIN TIME: CPT | Performed by: INTERNAL MEDICINE

## 2022-08-14 DIAGNOSIS — M19.071 OSTEOARTHRITIS OF RIGHT ANKLE, UNSPECIFIED OSTEOARTHRITIS TYPE: ICD-10-CM

## 2022-08-14 DIAGNOSIS — M05.742 RHEUMATOID ARTHRITIS INVOLVING BOTH HANDS WITH POSITIVE RHEUMATOID FACTOR (HCC): ICD-10-CM

## 2022-08-14 DIAGNOSIS — M05.741 RHEUMATOID ARTHRITIS INVOLVING BOTH HANDS WITH POSITIVE RHEUMATOID FACTOR (HCC): ICD-10-CM

## 2022-08-14 RX ORDER — HYDROCODONE BITARTRATE AND ACETAMINOPHEN 5; 325 MG/1; MG/1
1 TABLET ORAL EVERY 6 HOURS PRN
Qty: 120 TABLET | Refills: 0 | Status: SHIPPED | OUTPATIENT
Start: 2022-08-14

## 2022-08-22 ENCOUNTER — ANTI-COAG (OUTPATIENT)
Dept: CARDIOLOGY | Age: 72
End: 2022-08-22

## 2022-08-22 DIAGNOSIS — I74.3 EMBOLISM AND THROMBOSIS OF ARTERIES OF LOWER EXTREMITY (CMD): Primary | ICD-10-CM

## 2022-08-24 ENCOUNTER — LAB SERVICES (OUTPATIENT)
Dept: LAB | Age: 72
End: 2022-08-24

## 2022-08-24 DIAGNOSIS — I74.3 EMBOLISM AND THROMBOSIS OF ARTERIES OF LOWER EXTREMITY (CMD): ICD-10-CM

## 2022-08-24 DIAGNOSIS — Z79.01 LONG TERM (CURRENT) USE OF ANTICOAGULANTS: ICD-10-CM

## 2022-08-24 LAB — INR BLDC: 3

## 2022-08-24 PROCEDURE — 36416 COLLJ CAPILLARY BLOOD SPEC: CPT | Performed by: INTERNAL MEDICINE

## 2022-08-24 PROCEDURE — 85610 PROTHROMBIN TIME: CPT | Performed by: INTERNAL MEDICINE

## 2022-08-25 ENCOUNTER — ANTI-COAG (OUTPATIENT)
Dept: CARDIOLOGY | Age: 72
End: 2022-08-25

## 2022-08-25 DIAGNOSIS — I74.3 EMBOLISM AND THROMBOSIS OF ARTERIES OF LOWER EXTREMITY (CMD): Primary | ICD-10-CM

## 2022-08-26 DIAGNOSIS — Z79.01 LONG TERM (CURRENT) USE OF ANTICOAGULANTS: ICD-10-CM

## 2022-08-26 DIAGNOSIS — I74.3 EMBOLISM AND THROMBOSIS OF ARTERIES OF LOWER EXTREMITY (CMD): Primary | ICD-10-CM

## 2022-09-12 DIAGNOSIS — E78.2 MIXED HYPERLIPIDEMIA: ICD-10-CM

## 2022-09-12 RX ORDER — ATORVASTATIN CALCIUM 20 MG/1
TABLET, FILM COATED ORAL
Qty: 90 TABLET | Refills: 1 | Status: SHIPPED | OUTPATIENT
Start: 2022-09-12

## 2022-09-12 NOTE — TELEPHONE ENCOUNTER
LOV: 7/12/2022 with IVETTE Baca  RTC: 6 months  Last Relevant Labs: 7/12/2022  Filled: 7/12/2022    #90 with 0 refills    No future appointments.

## 2022-09-13 DIAGNOSIS — M05.741 RHEUMATOID ARTHRITIS INVOLVING BOTH HANDS WITH POSITIVE RHEUMATOID FACTOR (HCC): ICD-10-CM

## 2022-09-13 DIAGNOSIS — M05.742 RHEUMATOID ARTHRITIS INVOLVING BOTH HANDS WITH POSITIVE RHEUMATOID FACTOR (HCC): ICD-10-CM

## 2022-09-13 DIAGNOSIS — M19.071 OSTEOARTHRITIS OF RIGHT ANKLE, UNSPECIFIED OSTEOARTHRITIS TYPE: ICD-10-CM

## 2022-09-14 RX ORDER — HYDROCODONE BITARTRATE AND ACETAMINOPHEN 5; 325 MG/1; MG/1
1 TABLET ORAL EVERY 6 HOURS PRN
Qty: 120 TABLET | Refills: 0 | Status: SHIPPED | OUTPATIENT
Start: 2022-09-14

## 2022-09-19 ENCOUNTER — TELEMEDICINE (OUTPATIENT)
Dept: INTERNAL MEDICINE CLINIC | Facility: CLINIC | Age: 72
End: 2022-09-19

## 2022-09-19 DIAGNOSIS — J06.9 ACUTE URI: Primary | ICD-10-CM

## 2022-09-19 DIAGNOSIS — Z20.822 EXPOSURE TO CONFIRMED CASE OF COVID-19: ICD-10-CM

## 2022-09-26 ENCOUNTER — TELEPHONE (OUTPATIENT)
Dept: CARDIOLOGY | Age: 72
End: 2022-09-26

## 2022-09-26 RX ORDER — WARFARIN SODIUM 5 MG/1
5 TABLET ORAL DAILY
Qty: 30 TABLET | Refills: 1 | Status: SHIPPED | OUTPATIENT
Start: 2022-09-26 | End: 2022-11-04

## 2022-10-07 ENCOUNTER — ANTI-COAG (OUTPATIENT)
Dept: CARDIOLOGY | Age: 72
End: 2022-10-07

## 2022-10-07 DIAGNOSIS — Z79.01 LONG TERM (CURRENT) USE OF ANTICOAGULANTS: ICD-10-CM

## 2022-10-07 DIAGNOSIS — I74.3 EMBOLISM AND THROMBOSIS OF ARTERIES OF LOWER EXTREMITY (CMD): Primary | ICD-10-CM

## 2022-10-13 DIAGNOSIS — M05.742 RHEUMATOID ARTHRITIS INVOLVING BOTH HANDS WITH POSITIVE RHEUMATOID FACTOR (HCC): ICD-10-CM

## 2022-10-13 DIAGNOSIS — M05.741 RHEUMATOID ARTHRITIS INVOLVING BOTH HANDS WITH POSITIVE RHEUMATOID FACTOR (HCC): ICD-10-CM

## 2022-10-13 DIAGNOSIS — M19.071 OSTEOARTHRITIS OF RIGHT ANKLE, UNSPECIFIED OSTEOARTHRITIS TYPE: ICD-10-CM

## 2022-10-13 RX ORDER — HYDROCODONE BITARTRATE AND ACETAMINOPHEN 5; 325 MG/1; MG/1
1 TABLET ORAL EVERY 6 HOURS PRN
Qty: 120 TABLET | Refills: 0 | Status: SHIPPED | OUTPATIENT
Start: 2022-10-13

## 2022-10-13 NOTE — TELEPHONE ENCOUNTER
Hydrocodone-acetaminophen 5-325 mg  Filled 9-14-22  Qty 120  0 refills  No upcoming appt.   LOV 7-12-22

## 2022-10-17 ENCOUNTER — ANTI-COAG (OUTPATIENT)
Dept: CARDIOLOGY | Age: 72
End: 2022-10-17

## 2022-10-17 DIAGNOSIS — Z79.01 LONG TERM (CURRENT) USE OF ANTICOAGULANTS: ICD-10-CM

## 2022-10-17 DIAGNOSIS — I74.3 EMBOLISM AND THROMBOSIS OF ARTERIES OF LOWER EXTREMITY (CMD): Primary | ICD-10-CM

## 2022-10-22 ENCOUNTER — TELEPHONE (OUTPATIENT)
Dept: CARDIOLOGY | Age: 72
End: 2022-10-22

## 2022-10-22 ENCOUNTER — LAB SERVICES (OUTPATIENT)
Dept: LAB | Age: 72
End: 2022-10-22

## 2022-10-22 DIAGNOSIS — I74.3 EMBOLISM AND THROMBOSIS OF ARTERIES OF LOWER EXTREMITY (CMD): ICD-10-CM

## 2022-10-22 DIAGNOSIS — Z79.01 LONG TERM (CURRENT) USE OF ANTICOAGULANTS: ICD-10-CM

## 2022-10-22 LAB
INR BLDC: 2.8
INR PPP: 2.8

## 2022-10-22 PROCEDURE — 85610 PROTHROMBIN TIME: CPT | Performed by: INTERNAL MEDICINE

## 2022-10-22 PROCEDURE — 36416 COLLJ CAPILLARY BLOOD SPEC: CPT | Performed by: INTERNAL MEDICINE

## 2022-10-24 ENCOUNTER — ANTI-COAG (OUTPATIENT)
Dept: CARDIOLOGY | Age: 72
End: 2022-10-24

## 2022-10-24 DIAGNOSIS — I74.3 EMBOLISM AND THROMBOSIS OF ARTERIES OF LOWER EXTREMITY (CMD): Primary | ICD-10-CM

## 2022-10-24 DIAGNOSIS — Z79.01 LONG TERM (CURRENT) USE OF ANTICOAGULANTS: ICD-10-CM

## 2022-11-04 RX ORDER — WARFARIN SODIUM 5 MG/1
5 TABLET ORAL DAILY
Qty: 30 TABLET | Refills: 5 | Status: SHIPPED | OUTPATIENT
Start: 2022-11-04 | End: 2023-10-03 | Stop reason: SDUPTHER

## 2022-11-14 ENCOUNTER — TELEPHONE (OUTPATIENT)
Dept: INTERNAL MEDICINE CLINIC | Facility: CLINIC | Age: 72
End: 2022-11-14

## 2022-11-14 DIAGNOSIS — M19.071 OSTEOARTHRITIS OF RIGHT ANKLE, UNSPECIFIED OSTEOARTHRITIS TYPE: ICD-10-CM

## 2022-11-14 DIAGNOSIS — M05.742 RHEUMATOID ARTHRITIS INVOLVING BOTH HANDS WITH POSITIVE RHEUMATOID FACTOR (HCC): ICD-10-CM

## 2022-11-14 DIAGNOSIS — M05.741 RHEUMATOID ARTHRITIS INVOLVING BOTH HANDS WITH POSITIVE RHEUMATOID FACTOR (HCC): ICD-10-CM

## 2022-11-15 RX ORDER — HYDROCODONE BITARTRATE AND ACETAMINOPHEN 5; 325 MG/1; MG/1
1 TABLET ORAL EVERY 6 HOURS PRN
Qty: 120 TABLET | Refills: 0 | OUTPATIENT
Start: 2022-11-15

## 2022-11-16 DIAGNOSIS — M05.742 RHEUMATOID ARTHRITIS INVOLVING BOTH HANDS WITH POSITIVE RHEUMATOID FACTOR (HCC): ICD-10-CM

## 2022-11-16 DIAGNOSIS — M05.741 RHEUMATOID ARTHRITIS INVOLVING BOTH HANDS WITH POSITIVE RHEUMATOID FACTOR (HCC): ICD-10-CM

## 2022-11-16 DIAGNOSIS — M19.071 OSTEOARTHRITIS OF RIGHT ANKLE, UNSPECIFIED OSTEOARTHRITIS TYPE: ICD-10-CM

## 2022-11-16 RX ORDER — HYDROCODONE BITARTRATE AND ACETAMINOPHEN 5; 325 MG/1; MG/1
1 TABLET ORAL EVERY 6 HOURS PRN
Qty: 120 TABLET | Refills: 0 | Status: SHIPPED | OUTPATIENT
Start: 2022-11-16

## 2022-11-21 ENCOUNTER — ANTI-COAG (OUTPATIENT)
Dept: CARDIOLOGY | Age: 72
End: 2022-11-21

## 2022-11-21 DIAGNOSIS — Z79.01 LONG TERM (CURRENT) USE OF ANTICOAGULANTS: ICD-10-CM

## 2022-11-21 DIAGNOSIS — I74.3 EMBOLISM AND THROMBOSIS OF ARTERIES OF LOWER EXTREMITY (CMD): Primary | ICD-10-CM

## 2022-11-29 ENCOUNTER — ANTI-COAG (OUTPATIENT)
Dept: CARDIOLOGY | Age: 72
End: 2022-11-29

## 2022-11-29 DIAGNOSIS — I74.3 EMBOLISM AND THROMBOSIS OF ARTERIES OF LOWER EXTREMITY (CMD): Primary | ICD-10-CM

## 2022-11-29 DIAGNOSIS — Z79.01 LONG TERM (CURRENT) USE OF ANTICOAGULANTS: ICD-10-CM

## 2022-12-06 ENCOUNTER — ANTI-COAG (OUTPATIENT)
Dept: CARDIOLOGY | Age: 72
End: 2022-12-06

## 2022-12-06 DIAGNOSIS — Z79.01 LONG TERM (CURRENT) USE OF ANTICOAGULANTS: ICD-10-CM

## 2022-12-06 DIAGNOSIS — I74.3 EMBOLISM AND THROMBOSIS OF ARTERIES OF LOWER EXTREMITY (CMD): Primary | ICD-10-CM

## 2022-12-08 ENCOUNTER — TELEPHONE (OUTPATIENT)
Dept: SCHEDULING | Age: 72
End: 2022-12-08

## 2022-12-08 ENCOUNTER — LAB SERVICES (OUTPATIENT)
Dept: LAB | Age: 72
End: 2022-12-08

## 2022-12-08 DIAGNOSIS — I74.3 EMBOLISM AND THROMBOSIS OF ARTERIES OF LOWER EXTREMITY (CMD): ICD-10-CM

## 2022-12-08 DIAGNOSIS — Z79.01 LONG TERM (CURRENT) USE OF ANTICOAGULANTS: ICD-10-CM

## 2022-12-08 LAB — INR PPP: 2.1

## 2022-12-08 PROCEDURE — 36416 COLLJ CAPILLARY BLOOD SPEC: CPT | Performed by: INTERNAL MEDICINE

## 2022-12-08 PROCEDURE — 85610 PROTHROMBIN TIME: CPT | Performed by: INTERNAL MEDICINE

## 2022-12-09 ENCOUNTER — ANTI-COAG (OUTPATIENT)
Dept: CARDIOLOGY | Age: 72
End: 2022-12-09

## 2022-12-09 DIAGNOSIS — I74.3 EMBOLISM AND THROMBOSIS OF ARTERIES OF LOWER EXTREMITY (CMD): Primary | ICD-10-CM

## 2022-12-09 DIAGNOSIS — Z79.01 LONG TERM (CURRENT) USE OF ANTICOAGULANTS: ICD-10-CM

## 2022-12-09 LAB — INR BLDC: 2.1

## 2022-12-12 RX ORDER — WARFARIN SODIUM 5 MG/1
TABLET ORAL
Qty: 80 TABLET | Refills: 1 | Status: SHIPPED | OUTPATIENT
Start: 2022-12-12 | End: 2023-10-03 | Stop reason: SDUPTHER

## 2022-12-14 DIAGNOSIS — M05.741 RHEUMATOID ARTHRITIS INVOLVING BOTH HANDS WITH POSITIVE RHEUMATOID FACTOR (HCC): ICD-10-CM

## 2022-12-14 DIAGNOSIS — M05.742 RHEUMATOID ARTHRITIS INVOLVING BOTH HANDS WITH POSITIVE RHEUMATOID FACTOR (HCC): ICD-10-CM

## 2022-12-14 DIAGNOSIS — M19.071 OSTEOARTHRITIS OF RIGHT ANKLE, UNSPECIFIED OSTEOARTHRITIS TYPE: ICD-10-CM

## 2022-12-14 RX ORDER — HYDROCODONE BITARTRATE AND ACETAMINOPHEN 5; 325 MG/1; MG/1
1 TABLET ORAL EVERY 6 HOURS PRN
Qty: 120 TABLET | Refills: 0 | OUTPATIENT
Start: 2022-12-14

## 2022-12-16 RX ORDER — HYDROCODONE BITARTRATE AND ACETAMINOPHEN 5; 325 MG/1; MG/1
1 TABLET ORAL EVERY 6 HOURS PRN
Qty: 120 TABLET | Refills: 0 | Status: SHIPPED | OUTPATIENT
Start: 2022-12-16

## 2022-12-16 NOTE — TELEPHONE ENCOUNTER
[address] like at the moment refill was requested it was too early, should be okay now. Rx pending.

## 2022-12-16 NOTE — TELEPHONE ENCOUNTER
Pt called and asked why it was denied. Stated that she wants this sent to [facility].      Please advise

## 2023-01-04 ENCOUNTER — OFFICE VISIT (OUTPATIENT)
Dept: INTERNAL MEDICINE CLINIC | Facility: CLINIC | Age: 73
End: 2023-01-04
Payer: COMMERCIAL

## 2023-01-04 VITALS
BODY MASS INDEX: 51.5 KG/M2 | RESPIRATION RATE: 20 BRPM | WEIGHT: 272.81 LBS | DIASTOLIC BLOOD PRESSURE: 60 MMHG | TEMPERATURE: 98 F | OXYGEN SATURATION: 97 % | HEART RATE: 96 BPM | HEIGHT: 61 IN | SYSTOLIC BLOOD PRESSURE: 138 MMHG

## 2023-01-04 DIAGNOSIS — F32.A DEPRESSION, UNSPECIFIED DEPRESSION TYPE: ICD-10-CM

## 2023-01-04 DIAGNOSIS — Z00.00 ROUTINE GENERAL MEDICAL EXAMINATION AT A HEALTH CARE FACILITY: Primary | ICD-10-CM

## 2023-01-04 DIAGNOSIS — M05.741 RHEUMATOID ARTHRITIS INVOLVING BOTH HANDS WITH POSITIVE RHEUMATOID FACTOR (HCC): ICD-10-CM

## 2023-01-04 DIAGNOSIS — I10 ESSENTIAL HYPERTENSION: ICD-10-CM

## 2023-01-04 DIAGNOSIS — Z79.01 LONG TERM (CURRENT) USE OF ANTICOAGULANTS: ICD-10-CM

## 2023-01-04 DIAGNOSIS — M19.071 OSTEOARTHRITIS OF RIGHT ANKLE, UNSPECIFIED OSTEOARTHRITIS TYPE: ICD-10-CM

## 2023-01-04 DIAGNOSIS — Z12.11 ENCOUNTER FOR SCREENING FECAL OCCULT BLOOD TESTING: ICD-10-CM

## 2023-01-04 DIAGNOSIS — M05.742 RHEUMATOID ARTHRITIS INVOLVING BOTH HANDS WITH POSITIVE RHEUMATOID FACTOR (HCC): ICD-10-CM

## 2023-01-04 DIAGNOSIS — E55.9 VITAMIN D DEFICIENCY: ICD-10-CM

## 2023-01-04 DIAGNOSIS — E11.65 TYPE 2 DIABETES MELLITUS WITH HYPERGLYCEMIA, WITHOUT LONG-TERM CURRENT USE OF INSULIN (HCC): ICD-10-CM

## 2023-01-04 DIAGNOSIS — E78.2 MIXED HYPERLIPIDEMIA: ICD-10-CM

## 2023-01-04 PROCEDURE — 99213 OFFICE O/P EST LOW 20 MIN: CPT | Performed by: FAMILY MEDICINE

## 2023-01-04 PROCEDURE — 3078F DIAST BP <80 MM HG: CPT | Performed by: FAMILY MEDICINE

## 2023-01-04 PROCEDURE — 3008F BODY MASS INDEX DOCD: CPT | Performed by: FAMILY MEDICINE

## 2023-01-04 PROCEDURE — 3075F SYST BP GE 130 - 139MM HG: CPT | Performed by: FAMILY MEDICINE

## 2023-01-04 PROCEDURE — 99397 PER PM REEVAL EST PAT 65+ YR: CPT | Performed by: FAMILY MEDICINE

## 2023-01-04 RX ORDER — FELODIPINE 10 MG/1
10 TABLET, EXTENDED RELEASE ORAL DAILY
Qty: 90 TABLET | Refills: 0 | Status: SHIPPED | OUTPATIENT
Start: 2023-01-04

## 2023-01-04 RX ORDER — METFORMIN HYDROCHLORIDE 500 MG/1
1000 TABLET, EXTENDED RELEASE ORAL
Qty: 180 TABLET | Refills: 0 | Status: SHIPPED | OUTPATIENT
Start: 2023-01-04

## 2023-01-04 RX ORDER — TRIAMTERENE AND HYDROCHLOROTHIAZIDE 37.5; 25 MG/1; MG/1
1 TABLET ORAL DAILY
Qty: 90 TABLET | Refills: 0 | Status: SHIPPED | OUTPATIENT
Start: 2023-01-04

## 2023-01-04 RX ORDER — FLUOXETINE HYDROCHLORIDE 40 MG/1
40 CAPSULE ORAL DAILY
Qty: 90 CAPSULE | Refills: 0 | Status: SHIPPED | OUTPATIENT
Start: 2023-01-04

## 2023-01-06 ENCOUNTER — ANTI-COAG (OUTPATIENT)
Dept: CARDIOLOGY | Age: 73
End: 2023-01-06

## 2023-01-06 DIAGNOSIS — I74.3 EMBOLISM AND THROMBOSIS OF ARTERIES OF LOWER EXTREMITY (CMD): Primary | ICD-10-CM

## 2023-01-06 DIAGNOSIS — Z79.01 LONG TERM (CURRENT) USE OF ANTICOAGULANTS: ICD-10-CM

## 2023-01-13 ENCOUNTER — ANTI-COAG (OUTPATIENT)
Dept: CARDIOLOGY | Age: 73
End: 2023-01-13

## 2023-01-13 DIAGNOSIS — Z79.01 LONG TERM (CURRENT) USE OF ANTICOAGULANTS: ICD-10-CM

## 2023-01-13 DIAGNOSIS — I74.3 EMBOLISM AND THROMBOSIS OF ARTERIES OF LOWER EXTREMITY (CMD): Primary | ICD-10-CM

## 2023-01-20 ENCOUNTER — ANTI-COAG (OUTPATIENT)
Dept: CARDIOLOGY | Age: 73
End: 2023-01-20

## 2023-01-20 DIAGNOSIS — I74.3 EMBOLISM AND THROMBOSIS OF ARTERIES OF LOWER EXTREMITY (CMD): Primary | ICD-10-CM

## 2023-01-20 DIAGNOSIS — Z79.01 LONG TERM (CURRENT) USE OF ANTICOAGULANTS: ICD-10-CM

## 2023-01-24 DIAGNOSIS — M05.741 RHEUMATOID ARTHRITIS INVOLVING BOTH HANDS WITH POSITIVE RHEUMATOID FACTOR (HCC): ICD-10-CM

## 2023-01-24 DIAGNOSIS — M19.071 OSTEOARTHRITIS OF RIGHT ANKLE, UNSPECIFIED OSTEOARTHRITIS TYPE: ICD-10-CM

## 2023-01-24 DIAGNOSIS — M05.742 RHEUMATOID ARTHRITIS INVOLVING BOTH HANDS WITH POSITIVE RHEUMATOID FACTOR (HCC): ICD-10-CM

## 2023-01-24 RX ORDER — HYDROCODONE BITARTRATE AND ACETAMINOPHEN 5; 325 MG/1; MG/1
1 TABLET ORAL EVERY 6 HOURS PRN
Qty: 120 TABLET | Refills: 0 | Status: SHIPPED | OUTPATIENT
Start: 2023-01-24

## 2023-01-27 ENCOUNTER — ANTI-COAG (OUTPATIENT)
Dept: CARDIOLOGY | Age: 73
End: 2023-01-27

## 2023-01-27 DIAGNOSIS — Z79.01 LONG TERM (CURRENT) USE OF ANTICOAGULANTS: ICD-10-CM

## 2023-01-27 DIAGNOSIS — I74.3 EMBOLISM AND THROMBOSIS OF ARTERIES OF LOWER EXTREMITY (CMD): Primary | ICD-10-CM

## 2023-02-06 ENCOUNTER — ANTI-COAG (OUTPATIENT)
Dept: CARDIOLOGY | Age: 73
End: 2023-02-06

## 2023-02-06 ENCOUNTER — TELEPHONE (OUTPATIENT)
Dept: CARDIOLOGY | Age: 73
End: 2023-02-06

## 2023-02-06 DIAGNOSIS — Z79.01 LONG TERM (CURRENT) USE OF ANTICOAGULANTS: ICD-10-CM

## 2023-02-06 DIAGNOSIS — I74.3 EMBOLISM AND THROMBOSIS OF ARTERIES OF LOWER EXTREMITY (CMD): Primary | ICD-10-CM

## 2023-02-14 ENCOUNTER — LAB ENCOUNTER (OUTPATIENT)
Dept: LAB | Age: 73
End: 2023-02-14
Attending: FAMILY MEDICINE
Payer: COMMERCIAL

## 2023-02-14 DIAGNOSIS — Z79.01 LONG TERM (CURRENT) USE OF ANTICOAGULANTS: ICD-10-CM

## 2023-02-14 DIAGNOSIS — Z00.00 ROUTINE GENERAL MEDICAL EXAMINATION AT A HEALTH CARE FACILITY: ICD-10-CM

## 2023-02-14 DIAGNOSIS — E55.9 VITAMIN D DEFICIENCY: ICD-10-CM

## 2023-02-14 DIAGNOSIS — E11.65 TYPE 2 DIABETES MELLITUS WITH HYPERGLYCEMIA, WITHOUT LONG-TERM CURRENT USE OF INSULIN (HCC): ICD-10-CM

## 2023-02-14 LAB
ALBUMIN SERPL-MCNC: 3.6 G/DL (ref 3.4–5)
ALBUMIN/GLOB SERPL: 0.9 {RATIO} (ref 1–2)
ALP LIVER SERPL-CCNC: 90 U/L
ALT SERPL-CCNC: 64 U/L
ANION GAP SERPL CALC-SCNC: 10 MMOL/L (ref 0–18)
AST SERPL-CCNC: 43 U/L (ref 15–37)
BASOPHILS # BLD AUTO: 0.09 X10(3) UL (ref 0–0.2)
BASOPHILS NFR BLD AUTO: 1.2 %
BILIRUB SERPL-MCNC: 1.3 MG/DL (ref 0.1–2)
BUN BLD-MCNC: 16 MG/DL (ref 7–18)
BUN/CREAT SERPL: 18.2 (ref 10–20)
CALCIUM BLD-MCNC: 9.7 MG/DL (ref 8.5–10.1)
CHLORIDE SERPL-SCNC: 100 MMOL/L (ref 98–112)
CHOLEST SERPL-MCNC: 145 MG/DL (ref ?–200)
CO2 SERPL-SCNC: 30 MMOL/L (ref 21–32)
CREAT BLD-MCNC: 0.88 MG/DL
CREAT UR-SCNC: 189 MG/DL
DEPRECATED RDW RBC AUTO: 46.5 FL (ref 35.1–46.3)
EOSINOPHIL # BLD AUTO: 0.43 X10(3) UL (ref 0–0.7)
EOSINOPHIL NFR BLD AUTO: 5.8 %
ERYTHROCYTE [DISTWIDTH] IN BLOOD BY AUTOMATED COUNT: 13.8 % (ref 11–15)
EST. AVERAGE GLUCOSE BLD GHB EST-MCNC: 166 MG/DL (ref 68–126)
FASTING PATIENT LIPID ANSWER: YES
FASTING STATUS PATIENT QL REPORTED: YES
GFR SERPLBLD BASED ON 1.73 SQ M-ARVRAT: 71 ML/MIN/1.73M2 (ref 60–?)
GLOBULIN PLAS-MCNC: 4.2 G/DL (ref 2.8–4.4)
GLUCOSE BLD-MCNC: 228 MG/DL (ref 70–99)
HBA1C MFR BLD: 7.4 % (ref ?–5.7)
HCT VFR BLD AUTO: 46.8 %
HDLC SERPL-MCNC: 56 MG/DL (ref 40–59)
HGB BLD-MCNC: 14.8 G/DL
IMM GRANULOCYTES # BLD AUTO: 0.03 X10(3) UL (ref 0–1)
IMM GRANULOCYTES NFR BLD: 0.4 %
INR BLD: 1.98 (ref 0.85–1.16)
INR PPP: 1.98
LDLC SERPL CALC-MCNC: 63 MG/DL (ref ?–100)
LYMPHOCYTES # BLD AUTO: 1.33 X10(3) UL (ref 1–4)
LYMPHOCYTES NFR BLD AUTO: 17.9 %
MCH RBC QN AUTO: 28.7 PG (ref 26–34)
MCHC RBC AUTO-ENTMCNC: 31.6 G/DL (ref 31–37)
MCV RBC AUTO: 90.9 FL
MICROALBUMIN UR-MCNC: 5.89 MG/DL
MICROALBUMIN/CREAT 24H UR-RTO: 31.2 UG/MG (ref ?–30)
MONOCYTES # BLD AUTO: 0.5 X10(3) UL (ref 0.1–1)
MONOCYTES NFR BLD AUTO: 6.7 %
NEUTROPHILS # BLD AUTO: 5.06 X10 (3) UL (ref 1.5–7.7)
NEUTROPHILS # BLD AUTO: 5.06 X10(3) UL (ref 1.5–7.7)
NEUTROPHILS NFR BLD AUTO: 68 %
NONHDLC SERPL-MCNC: 89 MG/DL (ref ?–130)
OSMOLALITY SERPL CALC.SUM OF ELEC: 298 MOSM/KG (ref 275–295)
PLATELET # BLD AUTO: 250 10(3)UL (ref 150–450)
POTASSIUM SERPL-SCNC: 3.7 MMOL/L (ref 3.5–5.1)
PROT SERPL-MCNC: 7.8 G/DL (ref 6.4–8.2)
PROTHROMBIN TIME: 22.2 SECONDS (ref 11.6–14.8)
RBC # BLD AUTO: 5.15 X10(6)UL
SODIUM SERPL-SCNC: 140 MMOL/L (ref 136–145)
TRIGL SERPL-MCNC: 151 MG/DL (ref 30–149)
TSI SER-ACNC: 1.67 MIU/ML (ref 0.36–3.74)
VIT D+METAB SERPL-MCNC: 35.3 NG/ML (ref 30–100)
VLDLC SERPL CALC-MCNC: 23 MG/DL (ref 0–30)
WBC # BLD AUTO: 7.4 X10(3) UL (ref 4–11)

## 2023-02-14 PROCEDURE — 85610 PROTHROMBIN TIME: CPT

## 2023-02-14 PROCEDURE — 80053 COMPREHEN METABOLIC PANEL: CPT

## 2023-02-14 PROCEDURE — 83036 HEMOGLOBIN GLYCOSYLATED A1C: CPT

## 2023-02-14 PROCEDURE — 82306 VITAMIN D 25 HYDROXY: CPT

## 2023-02-14 PROCEDURE — 82570 ASSAY OF URINE CREATININE: CPT

## 2023-02-14 PROCEDURE — 36415 COLL VENOUS BLD VENIPUNCTURE: CPT

## 2023-02-14 PROCEDURE — 82043 UR ALBUMIN QUANTITATIVE: CPT

## 2023-02-14 PROCEDURE — 85025 COMPLETE CBC W/AUTO DIFF WBC: CPT

## 2023-02-14 PROCEDURE — 80061 LIPID PANEL: CPT

## 2023-02-14 PROCEDURE — 84443 ASSAY THYROID STIM HORMONE: CPT

## 2023-02-15 ENCOUNTER — TELEPHONE (OUTPATIENT)
Dept: INTERNAL MEDICINE CLINIC | Facility: CLINIC | Age: 73
End: 2023-02-15

## 2023-02-15 ENCOUNTER — ANTI-COAG (OUTPATIENT)
Dept: CARDIOLOGY | Age: 73
End: 2023-02-15

## 2023-02-15 DIAGNOSIS — I74.3 EMBOLISM AND THROMBOSIS OF ARTERIES OF LOWER EXTREMITY (CMD): Primary | ICD-10-CM

## 2023-02-15 DIAGNOSIS — Z79.01 LONG TERM (CURRENT) USE OF ANTICOAGULANTS: ICD-10-CM

## 2023-02-15 DIAGNOSIS — E11.9 DIABETES MELLITUS WITHOUT COMPLICATION (HCC): Primary | ICD-10-CM

## 2023-02-15 DIAGNOSIS — R74.8 ELEVATED LIVER ENZYMES: ICD-10-CM

## 2023-02-15 RX ORDER — METFORMIN HYDROCHLORIDE 500 MG/1
1500 TABLET, EXTENDED RELEASE ORAL
Qty: 180 TABLET | Refills: 0 | COMMUNITY
Start: 2023-02-15

## 2023-02-15 NOTE — TELEPHONE ENCOUNTER
----- Message from IVETTE Zavala sent at 2/15/2023  1:23 PM CST -----  Lets have Pt take Metformin  mg 3 tablets daily and have her recheck a Hgba1c in 3 months. Hold the [place] for now. Pt should be checking her glucose levels daily and eating a low carb diet.

## 2023-02-27 DIAGNOSIS — M19.071 OSTEOARTHRITIS OF RIGHT ANKLE, UNSPECIFIED OSTEOARTHRITIS TYPE: ICD-10-CM

## 2023-02-27 DIAGNOSIS — M05.741 RHEUMATOID ARTHRITIS INVOLVING BOTH HANDS WITH POSITIVE RHEUMATOID FACTOR (HCC): ICD-10-CM

## 2023-02-27 DIAGNOSIS — M05.742 RHEUMATOID ARTHRITIS INVOLVING BOTH HANDS WITH POSITIVE RHEUMATOID FACTOR (HCC): ICD-10-CM

## 2023-02-28 RX ORDER — HYDROCODONE BITARTRATE AND ACETAMINOPHEN 5; 325 MG/1; MG/1
1 TABLET ORAL EVERY 6 HOURS PRN
Qty: 120 TABLET | Refills: 0 | Status: SHIPPED | OUTPATIENT
Start: 2023-02-28

## 2023-03-08 ENCOUNTER — ANTI-COAG (OUTPATIENT)
Dept: CARDIOLOGY | Age: 73
End: 2023-03-08

## 2023-03-08 DIAGNOSIS — I74.3 EMBOLISM AND THROMBOSIS OF ARTERIES OF LOWER EXTREMITY (CMD): Primary | ICD-10-CM

## 2023-03-08 DIAGNOSIS — Z79.01 LONG TERM (CURRENT) USE OF ANTICOAGULANTS: ICD-10-CM

## 2023-03-14 ENCOUNTER — TELEPHONE (OUTPATIENT)
Dept: CARDIOLOGY | Age: 73
End: 2023-03-14

## 2023-03-14 ENCOUNTER — ANTI-COAG (OUTPATIENT)
Dept: CARDIOLOGY | Age: 73
End: 2023-03-14

## 2023-03-14 ENCOUNTER — LAB SERVICES (OUTPATIENT)
Dept: LAB | Age: 73
End: 2023-03-14

## 2023-03-14 DIAGNOSIS — Z79.01 LONG TERM (CURRENT) USE OF ANTICOAGULANTS: ICD-10-CM

## 2023-03-14 DIAGNOSIS — I74.3 EMBOLISM AND THROMBOSIS OF ARTERIES OF LOWER EXTREMITY (CMD): Primary | ICD-10-CM

## 2023-03-14 DIAGNOSIS — Z79.01 LONG TERM (CURRENT) USE OF ANTICOAGULANTS: Primary | ICD-10-CM

## 2023-03-14 DIAGNOSIS — I74.3 EMBOLISM AND THROMBOSIS OF ARTERIES OF THE LOWER EXTREMITIES (CMD): ICD-10-CM

## 2023-03-14 LAB — INR BLDC: 1.5

## 2023-03-14 PROCEDURE — 36416 COLLJ CAPILLARY BLOOD SPEC: CPT | Performed by: INTERNAL MEDICINE

## 2023-03-14 PROCEDURE — 85610 PROTHROMBIN TIME: CPT | Performed by: INTERNAL MEDICINE

## 2023-03-14 RX ORDER — WARFARIN SODIUM 5 MG/1
TABLET ORAL
Qty: 70 TABLET | Refills: 1 | Status: SHIPPED | OUTPATIENT
Start: 2023-03-14 | End: 2023-08-07 | Stop reason: SDUPTHER

## 2023-03-15 ENCOUNTER — TELEPHONE (OUTPATIENT)
Dept: CARDIOLOGY | Age: 73
End: 2023-03-15

## 2023-03-20 RX ORDER — METFORMIN HYDROCHLORIDE 500 MG/1
TABLET, EXTENDED RELEASE ORAL
Qty: 270 TABLET | Refills: 0 | Status: SHIPPED | OUTPATIENT
Start: 2023-03-20

## 2023-03-20 RX ORDER — METFORMIN HYDROCHLORIDE 500 MG/1
1500 TABLET, EXTENDED RELEASE ORAL
Qty: 180 TABLET | Refills: 0 | Status: SHIPPED | OUTPATIENT
Start: 2023-03-20 | End: 2023-03-20

## 2023-03-22 ENCOUNTER — ANTI-COAG (OUTPATIENT)
Dept: CARDIOLOGY | Age: 73
End: 2023-03-22

## 2023-03-22 DIAGNOSIS — Z79.01 LONG TERM (CURRENT) USE OF ANTICOAGULANTS: ICD-10-CM

## 2023-03-22 DIAGNOSIS — I74.3 EMBOLISM AND THROMBOSIS OF ARTERIES OF LOWER EXTREMITY (CMD): Primary | ICD-10-CM

## 2023-03-26 ENCOUNTER — LAB ENCOUNTER (OUTPATIENT)
Dept: LAB | Age: 73
End: 2023-03-26
Attending: FAMILY MEDICINE
Payer: COMMERCIAL

## 2023-03-26 ENCOUNTER — NURSE ONLY (OUTPATIENT)
Dept: LAB | Age: 73
End: 2023-03-26
Attending: FAMILY MEDICINE
Payer: COMMERCIAL

## 2023-03-26 DIAGNOSIS — R74.8 ELEVATED LIVER ENZYMES: ICD-10-CM

## 2023-03-26 DIAGNOSIS — Z79.01 LONG TERM (CURRENT) USE OF ANTICOAGULANTS: ICD-10-CM

## 2023-03-26 DIAGNOSIS — I74.3 EMBOLISM AND THROMBOSIS OF ARTERIES OF LOWER EXTREMITY (HCC): Primary | ICD-10-CM

## 2023-03-26 LAB
ALT SERPL-CCNC: 47 U/L
AST SERPL-CCNC: 29 U/L (ref 15–37)
INR BLDC: 2.5 (ref 0.9–1.1)
INR PPP: 2.5

## 2023-03-26 PROCEDURE — 84460 ALANINE AMINO (ALT) (SGPT): CPT | Performed by: FAMILY MEDICINE

## 2023-03-26 PROCEDURE — 84450 TRANSFERASE (AST) (SGOT): CPT | Performed by: FAMILY MEDICINE

## 2023-03-27 ENCOUNTER — ANTI-COAG (OUTPATIENT)
Dept: CARDIOLOGY | Age: 73
End: 2023-03-27

## 2023-03-27 DIAGNOSIS — I74.3 EMBOLISM AND THROMBOSIS OF ARTERIES OF LOWER EXTREMITY (CMD): Primary | ICD-10-CM

## 2023-03-27 DIAGNOSIS — Z79.01 LONG TERM (CURRENT) USE OF ANTICOAGULANTS: ICD-10-CM

## 2023-03-28 DIAGNOSIS — M05.741 RHEUMATOID ARTHRITIS INVOLVING BOTH HANDS WITH POSITIVE RHEUMATOID FACTOR (HCC): ICD-10-CM

## 2023-03-28 DIAGNOSIS — M05.742 RHEUMATOID ARTHRITIS INVOLVING BOTH HANDS WITH POSITIVE RHEUMATOID FACTOR (HCC): ICD-10-CM

## 2023-03-28 DIAGNOSIS — M19.071 OSTEOARTHRITIS OF RIGHT ANKLE, UNSPECIFIED OSTEOARTHRITIS TYPE: ICD-10-CM

## 2023-03-28 RX ORDER — HYDROCODONE BITARTRATE AND ACETAMINOPHEN 5; 325 MG/1; MG/1
1 TABLET ORAL EVERY 6 HOURS PRN
Qty: 120 TABLET | Refills: 0 | Status: SHIPPED | OUTPATIENT
Start: 2023-03-28

## 2023-04-11 DIAGNOSIS — F32.A DEPRESSION, UNSPECIFIED DEPRESSION TYPE: ICD-10-CM

## 2023-04-11 RX ORDER — FLUOXETINE HYDROCHLORIDE 40 MG/1
40 CAPSULE ORAL DAILY
Qty: 90 CAPSULE | Refills: 0 | Status: SHIPPED | OUTPATIENT
Start: 2023-04-11

## 2023-04-13 ENCOUNTER — APPOINTMENT (OUTPATIENT)
Dept: CARDIOLOGY | Age: 73
End: 2023-04-13

## 2023-04-17 ENCOUNTER — APPOINTMENT (OUTPATIENT)
Dept: CARDIOLOGY | Age: 73
End: 2023-04-17

## 2023-04-19 ENCOUNTER — APPOINTMENT (OUTPATIENT)
Dept: CARDIOLOGY | Age: 73
End: 2023-04-19

## 2023-04-23 DIAGNOSIS — I10 ESSENTIAL HYPERTENSION: ICD-10-CM

## 2023-04-24 ENCOUNTER — ANTI-COAG (OUTPATIENT)
Dept: CARDIOLOGY | Age: 73
End: 2023-04-24

## 2023-04-24 DIAGNOSIS — Z79.01 LONG TERM (CURRENT) USE OF ANTICOAGULANTS: ICD-10-CM

## 2023-04-24 DIAGNOSIS — I74.3 EMBOLISM AND THROMBOSIS OF ARTERIES OF LOWER EXTREMITY (CMD): Primary | ICD-10-CM

## 2023-04-24 RX ORDER — TRIAMTERENE AND HYDROCHLOROTHIAZIDE 37.5; 25 MG/1; MG/1
1 TABLET ORAL DAILY
Qty: 90 TABLET | Refills: 0 | Status: SHIPPED | OUTPATIENT
Start: 2023-04-24

## 2023-04-24 RX ORDER — FELODIPINE 10 MG/1
10 TABLET, EXTENDED RELEASE ORAL DAILY
Qty: 90 TABLET | Refills: 0 | Status: SHIPPED | OUTPATIENT
Start: 2023-04-24

## 2023-04-28 DIAGNOSIS — M05.741 RHEUMATOID ARTHRITIS INVOLVING BOTH HANDS WITH POSITIVE RHEUMATOID FACTOR (HCC): ICD-10-CM

## 2023-04-28 DIAGNOSIS — M05.742 RHEUMATOID ARTHRITIS INVOLVING BOTH HANDS WITH POSITIVE RHEUMATOID FACTOR (HCC): ICD-10-CM

## 2023-04-28 DIAGNOSIS — M19.071 OSTEOARTHRITIS OF RIGHT ANKLE, UNSPECIFIED OSTEOARTHRITIS TYPE: ICD-10-CM

## 2023-04-29 RX ORDER — HYDROCODONE BITARTRATE AND ACETAMINOPHEN 5; 325 MG/1; MG/1
1 TABLET ORAL EVERY 6 HOURS PRN
Qty: 120 TABLET | Refills: 0 | Status: SHIPPED | OUTPATIENT
Start: 2023-04-29

## 2023-05-02 ENCOUNTER — ANTI-COAG (OUTPATIENT)
Dept: CARDIOLOGY | Age: 73
End: 2023-05-02

## 2023-05-02 DIAGNOSIS — Z79.01 LONG TERM (CURRENT) USE OF ANTICOAGULANTS: ICD-10-CM

## 2023-05-02 DIAGNOSIS — I74.3 EMBOLISM AND THROMBOSIS OF ARTERIES OF LOWER EXTREMITY (CMD): Primary | ICD-10-CM

## 2023-05-09 ENCOUNTER — ANTI-COAG (OUTPATIENT)
Dept: CARDIOLOGY | Age: 73
End: 2023-05-09

## 2023-05-09 DIAGNOSIS — I74.3 EMBOLISM AND THROMBOSIS OF ARTERIES OF LOWER EXTREMITY (CMD): Primary | ICD-10-CM

## 2023-05-09 DIAGNOSIS — Z79.01 LONG TERM (CURRENT) USE OF ANTICOAGULANTS: ICD-10-CM

## 2023-05-16 ENCOUNTER — ANTI-COAG (OUTPATIENT)
Dept: CARDIOLOGY | Age: 73
End: 2023-05-16

## 2023-05-16 DIAGNOSIS — I74.3 EMBOLISM AND THROMBOSIS OF ARTERIES OF LOWER EXTREMITY (CMD): Primary | ICD-10-CM

## 2023-05-16 DIAGNOSIS — Z79.01 LONG TERM (CURRENT) USE OF ANTICOAGULANTS: ICD-10-CM

## 2023-05-30 DIAGNOSIS — M05.742 RHEUMATOID ARTHRITIS INVOLVING BOTH HANDS WITH POSITIVE RHEUMATOID FACTOR (HCC): ICD-10-CM

## 2023-05-30 DIAGNOSIS — M05.741 RHEUMATOID ARTHRITIS INVOLVING BOTH HANDS WITH POSITIVE RHEUMATOID FACTOR (HCC): ICD-10-CM

## 2023-05-30 DIAGNOSIS — M19.071 OSTEOARTHRITIS OF RIGHT ANKLE, UNSPECIFIED OSTEOARTHRITIS TYPE: ICD-10-CM

## 2023-05-30 RX ORDER — HYDROCODONE BITARTRATE AND ACETAMINOPHEN 5; 325 MG/1; MG/1
1 TABLET ORAL EVERY 6 HOURS PRN
Qty: 120 TABLET | Refills: 0 | Status: SHIPPED | OUTPATIENT
Start: 2023-05-30

## 2023-06-13 ENCOUNTER — LAB ENCOUNTER (OUTPATIENT)
Dept: LAB | Age: 73
End: 2023-06-13
Attending: INTERNAL MEDICINE
Payer: COMMERCIAL

## 2023-06-13 ENCOUNTER — ANTI-COAG (OUTPATIENT)
Dept: CARDIOLOGY | Age: 73
End: 2023-06-13

## 2023-06-13 ENCOUNTER — TELEPHONE (OUTPATIENT)
Dept: CARDIOLOGY | Age: 73
End: 2023-06-13

## 2023-06-13 DIAGNOSIS — Z79.01 LONG TERM (CURRENT) USE OF ANTICOAGULANTS: ICD-10-CM

## 2023-06-13 DIAGNOSIS — I74.3 EMBOLISM AND THROMBOSIS OF ARTERIES OF LOWER EXTREMITY (HCC): ICD-10-CM

## 2023-06-13 DIAGNOSIS — E78.2 MIXED HYPERLIPIDEMIA: ICD-10-CM

## 2023-06-13 DIAGNOSIS — I74.3 EMBOLISM AND THROMBOSIS OF ARTERIES OF LOWER EXTREMITY (CMD): Primary | ICD-10-CM

## 2023-06-13 DIAGNOSIS — E11.65 TYPE 2 DIABETES MELLITUS WITH HYPERGLYCEMIA, WITHOUT LONG-TERM CURRENT USE OF INSULIN (HCC): ICD-10-CM

## 2023-06-13 LAB
ALBUMIN SERPL-MCNC: 3.6 G/DL (ref 3.4–5)
ALBUMIN/GLOB SERPL: 0.9 {RATIO} (ref 1–2)
ALP LIVER SERPL-CCNC: 77 U/L
ALT SERPL-CCNC: 48 U/L
ANION GAP SERPL CALC-SCNC: 4 MMOL/L (ref 0–18)
AST SERPL-CCNC: 29 U/L (ref 15–37)
BILIRUB SERPL-MCNC: 0.9 MG/DL (ref 0.1–2)
BUN BLD-MCNC: 9 MG/DL (ref 7–18)
BUN/CREAT SERPL: 11.4 (ref 10–20)
CALCIUM BLD-MCNC: 9.4 MG/DL (ref 8.5–10.1)
CHLORIDE SERPL-SCNC: 102 MMOL/L (ref 98–112)
CHOLEST SERPL-MCNC: 122 MG/DL (ref ?–200)
CO2 SERPL-SCNC: 32 MMOL/L (ref 21–32)
CREAT BLD-MCNC: 0.79 MG/DL
EST. AVERAGE GLUCOSE BLD GHB EST-MCNC: 235 MG/DL (ref 68–126)
FASTING PATIENT LIPID ANSWER: YES
FASTING STATUS PATIENT QL REPORTED: YES
GFR SERPLBLD BASED ON 1.73 SQ M-ARVRAT: 80 ML/MIN/1.73M2 (ref 60–?)
GLOBULIN PLAS-MCNC: 3.9 G/DL (ref 2.8–4.4)
GLUCOSE BLD-MCNC: 283 MG/DL (ref 70–99)
HBA1C MFR BLD: 9.8 % (ref ?–5.7)
HDLC SERPL-MCNC: 56 MG/DL (ref 40–59)
INR BLDC: 3.3 (ref 0.9–1.1)
INR PPP: 3.3
LDLC SERPL CALC-MCNC: 42 MG/DL (ref ?–100)
NONHDLC SERPL-MCNC: 66 MG/DL (ref ?–130)
OSMOLALITY SERPL CALC.SUM OF ELEC: 295 MOSM/KG (ref 275–295)
POTASSIUM SERPL-SCNC: 4.1 MMOL/L (ref 3.5–5.1)
PROT SERPL-MCNC: 7.5 G/DL (ref 6.4–8.2)
SODIUM SERPL-SCNC: 138 MMOL/L (ref 136–145)
TRIGL SERPL-MCNC: 143 MG/DL (ref 30–149)
VLDLC SERPL CALC-MCNC: 20 MG/DL (ref 0–30)

## 2023-06-13 PROCEDURE — 80061 LIPID PANEL: CPT | Performed by: FAMILY MEDICINE

## 2023-06-13 PROCEDURE — 80053 COMPREHEN METABOLIC PANEL: CPT | Performed by: FAMILY MEDICINE

## 2023-06-13 PROCEDURE — 3046F HEMOGLOBIN A1C LEVEL >9.0%: CPT | Performed by: FAMILY MEDICINE

## 2023-06-13 PROCEDURE — 83036 HEMOGLOBIN GLYCOSYLATED A1C: CPT | Performed by: FAMILY MEDICINE

## 2023-06-14 ENCOUNTER — TELEPHONE (OUTPATIENT)
Dept: INTERNAL MEDICINE CLINIC | Facility: CLINIC | Age: 73
End: 2023-06-14

## 2023-06-14 ENCOUNTER — OFFICE VISIT (OUTPATIENT)
Dept: INTERNAL MEDICINE CLINIC | Facility: CLINIC | Age: 73
End: 2023-06-14
Payer: COMMERCIAL

## 2023-06-14 VITALS
TEMPERATURE: 98 F | DIASTOLIC BLOOD PRESSURE: 70 MMHG | SYSTOLIC BLOOD PRESSURE: 138 MMHG | HEIGHT: 61 IN | HEART RATE: 88 BPM | WEIGHT: 279 LBS | BODY MASS INDEX: 52.67 KG/M2 | RESPIRATION RATE: 18 BRPM | OXYGEN SATURATION: 96 %

## 2023-06-14 DIAGNOSIS — F33.0 MILD EPISODE OF RECURRENT MAJOR DEPRESSIVE DISORDER (HCC): ICD-10-CM

## 2023-06-14 DIAGNOSIS — M05.741 RHEUMATOID ARTHRITIS INVOLVING BOTH HANDS WITH POSITIVE RHEUMATOID FACTOR (HCC): Primary | ICD-10-CM

## 2023-06-14 DIAGNOSIS — F32.A DEPRESSION, UNSPECIFIED DEPRESSION TYPE: ICD-10-CM

## 2023-06-14 DIAGNOSIS — E11.9 DIABETES MELLITUS WITHOUT COMPLICATION (HCC): Primary | ICD-10-CM

## 2023-06-14 DIAGNOSIS — L72.9 SKIN CYST: ICD-10-CM

## 2023-06-14 DIAGNOSIS — E78.2 MIXED HYPERLIPIDEMIA: ICD-10-CM

## 2023-06-14 DIAGNOSIS — M05.742 RHEUMATOID ARTHRITIS INVOLVING BOTH HANDS WITH POSITIVE RHEUMATOID FACTOR (HCC): Primary | ICD-10-CM

## 2023-06-14 DIAGNOSIS — M05.742 RHEUMATOID ARTHRITIS INVOLVING BOTH HANDS WITH POSITIVE RHEUMATOID FACTOR (HCC): ICD-10-CM

## 2023-06-14 DIAGNOSIS — M19.071 OSTEOARTHRITIS OF RIGHT ANKLE, UNSPECIFIED OSTEOARTHRITIS TYPE: ICD-10-CM

## 2023-06-14 DIAGNOSIS — M05.741 RHEUMATOID ARTHRITIS INVOLVING BOTH HANDS WITH POSITIVE RHEUMATOID FACTOR (HCC): ICD-10-CM

## 2023-06-14 DIAGNOSIS — I10 ESSENTIAL HYPERTENSION: ICD-10-CM

## 2023-06-14 DIAGNOSIS — N39.3 STRESS INCONTINENCE, FEMALE: ICD-10-CM

## 2023-06-14 PROCEDURE — 3075F SYST BP GE 130 - 139MM HG: CPT | Performed by: FAMILY MEDICINE

## 2023-06-14 PROCEDURE — 3008F BODY MASS INDEX DOCD: CPT | Performed by: FAMILY MEDICINE

## 2023-06-14 PROCEDURE — 3078F DIAST BP <80 MM HG: CPT | Performed by: FAMILY MEDICINE

## 2023-06-14 PROCEDURE — 99214 OFFICE O/P EST MOD 30 MIN: CPT | Performed by: FAMILY MEDICINE

## 2023-06-14 RX ORDER — FLUOXETINE HYDROCHLORIDE 40 MG/1
40 CAPSULE ORAL DAILY
Qty: 90 CAPSULE | Refills: 0 | Status: SHIPPED | OUTPATIENT
Start: 2023-06-14

## 2023-06-14 RX ORDER — FELODIPINE 10 MG/1
10 TABLET, EXTENDED RELEASE ORAL DAILY
Qty: 90 TABLET | Refills: 0 | Status: SHIPPED | OUTPATIENT
Start: 2023-06-14

## 2023-06-14 RX ORDER — TRIAMTERENE AND HYDROCHLOROTHIAZIDE 37.5; 25 MG/1; MG/1
1 TABLET ORAL DAILY
Qty: 90 TABLET | Refills: 0 | Status: SHIPPED | OUTPATIENT
Start: 2023-06-14

## 2023-06-14 RX ORDER — BLOOD-GLUCOSE METER
EACH MISCELLANEOUS
Qty: 1 KIT | Refills: 0 | Status: CANCELLED | OUTPATIENT
Start: 2023-06-14

## 2023-06-14 RX ORDER — ATORVASTATIN CALCIUM 20 MG/1
20 TABLET, FILM COATED ORAL DAILY
Qty: 90 TABLET | Refills: 0 | Status: SHIPPED | OUTPATIENT
Start: 2023-06-14

## 2023-06-14 RX ORDER — METFORMIN HYDROCHLORIDE 500 MG/1
1500 TABLET, EXTENDED RELEASE ORAL
Qty: 270 TABLET | Refills: 0 | Status: SHIPPED | OUTPATIENT
Start: 2023-06-14

## 2023-06-14 RX ORDER — HYDROCODONE BITARTRATE AND ACETAMINOPHEN 5; 325 MG/1; MG/1
1 TABLET ORAL EVERY 6 HOURS PRN
Qty: 120 TABLET | Refills: 0 | Status: SHIPPED | OUTPATIENT
Start: 2023-06-14

## 2023-06-14 NOTE — TELEPHONE ENCOUNTER
[facility]. They do sell stand up walkers, but they are not billable to insurance (even BCBS PPO), so it would only be out of pocket. She stated the walkers range from $550 - $800. Will try other vendors for stand up walkers similar to :     [employer] Mobility Upright Walker with Seat - Stand Up Rollator, Arm Rests, Heavy Duty, Folding Medical Aid Scooter     [employer]: ph #[phone] spoke with Irving Davis. She stated they do have upright walkers available. To place DME order and fax to : Fax # [phone]   they will obtain authorization from insurance. Referral placed and faxed.      Received Fax confirmation.  -    Pt notified and gave # for [employer].

## 2023-06-15 ENCOUNTER — TELEPHONE (OUTPATIENT)
Dept: INTERNAL MEDICINE CLINIC | Facility: CLINIC | Age: 73
End: 2023-06-15

## 2023-06-15 DIAGNOSIS — E11.9 DIABETES MELLITUS WITHOUT COMPLICATION (HCC): ICD-10-CM

## 2023-06-15 DIAGNOSIS — E78.5 HYPERLIPIDEMIA, UNSPECIFIED HYPERLIPIDEMIA TYPE: Primary | ICD-10-CM

## 2023-06-15 NOTE — TELEPHONE ENCOUNTER
----- Message from IVETTE Cabrera sent at 6/14/2023  8:28 AM CDT -----  FBS- 283, Average glucose 235, Hgba1c 9.8. Pt's diabetes is out of control. Pt to continue her metformin but add farxiga 5 mg daily. recheck hgba1c in 3 months. Rest of labs look ok. Continue chol med. Recheck lipids in 6 months.

## 2023-06-21 ENCOUNTER — ANTI-COAG (OUTPATIENT)
Dept: CARDIOLOGY | Age: 73
End: 2023-06-21

## 2023-06-21 DIAGNOSIS — Z79.01 LONG TERM (CURRENT) USE OF ANTICOAGULANTS: ICD-10-CM

## 2023-06-21 DIAGNOSIS — I74.3 EMBOLISM AND THROMBOSIS OF ARTERIES OF LOWER EXTREMITY (CMD): Primary | ICD-10-CM

## 2023-06-28 ENCOUNTER — ANTI-COAG (OUTPATIENT)
Dept: CARDIOLOGY | Age: 73
End: 2023-06-28

## 2023-06-28 DIAGNOSIS — I74.3 EMBOLISM AND THROMBOSIS OF ARTERIES OF LOWER EXTREMITY (CMD): Primary | ICD-10-CM

## 2023-06-28 DIAGNOSIS — Z79.01 LONG TERM (CURRENT) USE OF ANTICOAGULANTS: ICD-10-CM

## 2023-07-06 ENCOUNTER — ANTI-COAG (OUTPATIENT)
Dept: CARDIOLOGY | Age: 73
End: 2023-07-06

## 2023-07-06 DIAGNOSIS — I74.3 EMBOLISM AND THROMBOSIS OF ARTERIES OF LOWER EXTREMITY (CMD): Primary | ICD-10-CM

## 2023-07-06 DIAGNOSIS — Z79.01 LONG TERM (CURRENT) USE OF ANTICOAGULANTS: ICD-10-CM

## 2023-07-08 DIAGNOSIS — E11.9 DIABETES MELLITUS WITHOUT COMPLICATION (HCC): ICD-10-CM

## 2023-07-13 ENCOUNTER — ANTI-COAG (OUTPATIENT)
Dept: CARDIOLOGY | Age: 73
End: 2023-07-13

## 2023-07-13 DIAGNOSIS — I74.3 EMBOLISM AND THROMBOSIS OF ARTERIES OF LOWER EXTREMITY (CMD): Primary | ICD-10-CM

## 2023-07-13 DIAGNOSIS — Z79.01 LONG TERM (CURRENT) USE OF ANTICOAGULANTS: ICD-10-CM

## 2023-07-18 ENCOUNTER — NURSE ONLY (OUTPATIENT)
Dept: LAB | Age: 73
End: 2023-07-18
Attending: INTERNAL MEDICINE
Payer: COMMERCIAL

## 2023-07-18 ENCOUNTER — TELEPHONE (OUTPATIENT)
Dept: CARDIOLOGY | Age: 73
End: 2023-07-18

## 2023-07-18 DIAGNOSIS — Z79.01 LONG TERM (CURRENT) USE OF ANTICOAGULANTS: ICD-10-CM

## 2023-07-18 DIAGNOSIS — I74.3 EMBOLISM AND THROMBOSIS OF ARTERIES OF LOWER EXTREMITY (HCC): ICD-10-CM

## 2023-07-18 LAB
INR BLDC: 3.1 (ref 0.9–1.1)
INR PPP: 3.1
INR PPP: 3.1

## 2023-07-18 PROCEDURE — 85610 PROTHROMBIN TIME: CPT

## 2023-07-19 ENCOUNTER — ANTI-COAG (OUTPATIENT)
Dept: CARDIOLOGY | Age: 73
End: 2023-07-19

## 2023-07-19 ENCOUNTER — TELEPHONE (OUTPATIENT)
Dept: CARDIOLOGY | Age: 73
End: 2023-07-19

## 2023-07-19 DIAGNOSIS — I10 ESSENTIAL HYPERTENSION: ICD-10-CM

## 2023-07-19 DIAGNOSIS — I74.3 EMBOLISM AND THROMBOSIS OF ARTERIES OF LOWER EXTREMITY (CMD): Primary | ICD-10-CM

## 2023-07-19 DIAGNOSIS — Z79.01 LONG TERM (CURRENT) USE OF ANTICOAGULANTS: ICD-10-CM

## 2023-07-19 RX ORDER — TRIAMTERENE AND HYDROCHLOROTHIAZIDE 37.5; 25 MG/1; MG/1
1 TABLET ORAL DAILY
Qty: 90 TABLET | Refills: 0 | Status: SHIPPED | OUTPATIENT
Start: 2023-07-19

## 2023-07-19 NOTE — TELEPHONE ENCOUNTER
Requesting   Name from pharmacy: TRIAMTERENE 37.5MG/ HCTZ 25MG TABS          Will file in chart as: TRIAMTERENE-HCTZ 37.5-25 MG Oral Tab    Sig: Take 1 tablet by mouth daily. Original sig: TAKE 1 TABLET BY MOUTH DAILY    Disp: 90 tablet    Refills: 0 (Pharmacy requested: Not specified)    Start: 7/19/2023    Class: Normal    Non-formulary For: Essential hypertension    Last ordered: 1 month ago by IVETTE Zavala Last refill: 4/24/2023    Rx #: [id number]    Hypertension Medications Protocol [id number]/19/2023 01:33 PM   Protocol Details CMP or BMP in past 12 months    Last serum creatinine< 2.0    Appointment in past 6 or next 3 months        LOV: 6/14/2023  RTC: 6 months   Last Relevant Labs: 6/13/2023  Filled: 6/14/2023 #90 with 0 refills    No future appointments.

## 2023-08-05 DIAGNOSIS — M05.742 RHEUMATOID ARTHRITIS INVOLVING BOTH HANDS WITH POSITIVE RHEUMATOID FACTOR (HCC): ICD-10-CM

## 2023-08-05 DIAGNOSIS — M19.071 OSTEOARTHRITIS OF RIGHT ANKLE, UNSPECIFIED OSTEOARTHRITIS TYPE: ICD-10-CM

## 2023-08-05 DIAGNOSIS — M05.741 RHEUMATOID ARTHRITIS INVOLVING BOTH HANDS WITH POSITIVE RHEUMATOID FACTOR (HCC): ICD-10-CM

## 2023-08-07 ENCOUNTER — OFFICE VISIT (OUTPATIENT)
Dept: CARDIOLOGY | Age: 73
End: 2023-08-07

## 2023-08-07 VITALS
SYSTOLIC BLOOD PRESSURE: 134 MMHG | WEIGHT: 275.13 LBS | HEIGHT: 62 IN | HEART RATE: 85 BPM | BODY MASS INDEX: 50.63 KG/M2 | DIASTOLIC BLOOD PRESSURE: 79 MMHG

## 2023-08-07 DIAGNOSIS — Z79.01 LONG TERM (CURRENT) USE OF ANTICOAGULANTS: ICD-10-CM

## 2023-08-07 DIAGNOSIS — E78.00 PURE HYPERCHOLESTEROLEMIA: Primary | ICD-10-CM

## 2023-08-07 DIAGNOSIS — I70.209 ATHEROSCLEROSIS OF ARTERIES OF EXTREMITIES (CMD): ICD-10-CM

## 2023-08-07 DIAGNOSIS — G47.33 OSA (OBSTRUCTIVE SLEEP APNEA): ICD-10-CM

## 2023-08-07 DIAGNOSIS — I10 ESSENTIAL HYPERTENSION: ICD-10-CM

## 2023-08-07 PROCEDURE — 99214 OFFICE O/P EST MOD 30 MIN: CPT | Performed by: INTERNAL MEDICINE

## 2023-08-07 PROCEDURE — 3075F SYST BP GE 130 - 139MM HG: CPT | Performed by: INTERNAL MEDICINE

## 2023-08-07 PROCEDURE — 3078F DIAST BP <80 MM HG: CPT | Performed by: INTERNAL MEDICINE

## 2023-08-07 RX ORDER — METFORMIN HYDROCHLORIDE 500 MG/1
1500 TABLET, EXTENDED RELEASE ORAL
COMMUNITY
Start: 2023-06-14

## 2023-08-07 RX ORDER — ATORVASTATIN CALCIUM 20 MG/1
TABLET, FILM COATED ORAL
COMMUNITY
Start: 2023-06-14

## 2023-08-07 RX ORDER — HYDROCODONE BITARTRATE AND ACETAMINOPHEN 5; 325 MG/1; MG/1
1 TABLET ORAL EVERY 6 HOURS PRN
Qty: 120 TABLET | Refills: 0 | Status: SHIPPED | OUTPATIENT
Start: 2023-08-07

## 2023-08-07 SDOH — HEALTH STABILITY: PHYSICAL HEALTH: ON AVERAGE, HOW MANY DAYS PER WEEK DO YOU ENGAGE IN MODERATE TO STRENUOUS EXERCISE (LIKE A BRISK WALK)?: 7 DAYS

## 2023-08-07 SDOH — HEALTH STABILITY: PHYSICAL HEALTH: ON AVERAGE, HOW MANY MINUTES DO YOU ENGAGE IN EXERCISE AT THIS LEVEL?: 10 MIN

## 2023-08-07 ASSESSMENT — PATIENT HEALTH QUESTIONNAIRE - PHQ9
1. LITTLE INTEREST OR PLEASURE IN DOING THINGS: NOT AT ALL
CLINICAL INTERPRETATION OF PHQ2 SCORE: NO FURTHER SCREENING NEEDED
SUM OF ALL RESPONSES TO PHQ9 QUESTIONS 1 AND 2: 0
2. FEELING DOWN, DEPRESSED OR HOPELESS: NOT AT ALL
SUM OF ALL RESPONSES TO PHQ9 QUESTIONS 1 AND 2: 0

## 2023-08-10 ENCOUNTER — ANTI-COAG (OUTPATIENT)
Dept: CARDIOLOGY | Age: 73
End: 2023-08-10

## 2023-08-10 DIAGNOSIS — Z79.01 LONG TERM (CURRENT) USE OF ANTICOAGULANTS: ICD-10-CM

## 2023-08-10 DIAGNOSIS — I74.3 EMBOLISM AND THROMBOSIS OF ARTERIES OF LOWER EXTREMITY (CMD): Primary | ICD-10-CM

## 2023-08-17 ENCOUNTER — ANTI-COAG (OUTPATIENT)
Dept: CARDIOLOGY | Age: 73
End: 2023-08-17

## 2023-08-17 DIAGNOSIS — Z79.01 LONG TERM (CURRENT) USE OF ANTICOAGULANTS: ICD-10-CM

## 2023-08-17 DIAGNOSIS — I74.3 EMBOLISM AND THROMBOSIS OF ARTERIES OF LOWER EXTREMITY (CMD): Primary | ICD-10-CM

## 2023-08-25 ENCOUNTER — ANTI-COAG (OUTPATIENT)
Dept: CARDIOLOGY | Age: 73
End: 2023-08-25

## 2023-08-25 DIAGNOSIS — Z79.01 LONG TERM (CURRENT) USE OF ANTICOAGULANTS: ICD-10-CM

## 2023-08-25 DIAGNOSIS — I74.3 EMBOLISM AND THROMBOSIS OF ARTERIES OF LOWER EXTREMITY (CMD): Primary | ICD-10-CM

## 2023-09-01 ENCOUNTER — ANTI-COAG (OUTPATIENT)
Dept: CARDIOLOGY | Age: 73
End: 2023-09-01

## 2023-09-01 DIAGNOSIS — I74.3 EMBOLISM AND THROMBOSIS OF ARTERIES OF LOWER EXTREMITY (CMD): Primary | ICD-10-CM

## 2023-09-01 DIAGNOSIS — Z79.01 LONG TERM (CURRENT) USE OF ANTICOAGULANTS: ICD-10-CM

## 2023-09-09 DIAGNOSIS — M05.742 RHEUMATOID ARTHRITIS INVOLVING BOTH HANDS WITH POSITIVE RHEUMATOID FACTOR (HCC): ICD-10-CM

## 2023-09-09 DIAGNOSIS — M05.741 RHEUMATOID ARTHRITIS INVOLVING BOTH HANDS WITH POSITIVE RHEUMATOID FACTOR (HCC): ICD-10-CM

## 2023-09-09 DIAGNOSIS — M19.071 OSTEOARTHRITIS OF RIGHT ANKLE, UNSPECIFIED OSTEOARTHRITIS TYPE: ICD-10-CM

## 2023-09-11 RX ORDER — HYDROCODONE BITARTRATE AND ACETAMINOPHEN 5; 325 MG/1; MG/1
1 TABLET ORAL EVERY 6 HOURS PRN
Qty: 120 TABLET | Refills: 0 | Status: SHIPPED | OUTPATIENT
Start: 2023-09-11

## 2023-09-11 NOTE — TELEPHONE ENCOUNTER
Requesting        HYDROcodone-acetaminophen 5-325 MG Oral Tab          Sig: Take 1 tablet by mouth every 6 (six) hours as needed. Disp: 120 tablet    Refills: 0    Start: 9/9/2023    Earliest Fill Date: 9/9/2023    Class: Normal    Non-formulary For: Rheumatoid arthritis involving both hands with positive rheumatoid factor ([facility] [address] Osteoarthritis of right ankle, unspecified osteoarthritis type    Last ordered: 1 month ago by Zandra Siemens, MD             LOV: 6/14/2023  RTC: labs in 3 months   Last Relevant Labs: n/a   Filled: 8/7/2023 #120 with 0 refills    No future appointments.

## 2023-09-15 ENCOUNTER — TELEPHONE (OUTPATIENT)
Dept: CARDIOLOGY | Age: 73
End: 2023-09-15

## 2023-09-15 ENCOUNTER — ANTI-COAG (OUTPATIENT)
Dept: CARDIOLOGY | Age: 73
End: 2023-09-15

## 2023-09-15 ENCOUNTER — NURSE TRIAGE (OUTPATIENT)
Dept: INTERNAL MEDICINE CLINIC | Facility: CLINIC | Age: 73
End: 2023-09-15

## 2023-09-15 DIAGNOSIS — Z79.01 LONG TERM (CURRENT) USE OF ANTICOAGULANTS: ICD-10-CM

## 2023-09-15 DIAGNOSIS — I74.3 EMBOLISM AND THROMBOSIS OF ARTERIES OF LOWER EXTREMITY (CMD): Primary | ICD-10-CM

## 2023-09-16 ENCOUNTER — HOSPITAL ENCOUNTER (OUTPATIENT)
Age: 73
Discharge: HOME | End: 2023-09-16
Attending: EMERGENCY MEDICINE
Payer: COMMERCIAL

## 2023-09-16 VITALS
OXYGEN SATURATION: 96 % | SYSTOLIC BLOOD PRESSURE: 144 MMHG | TEMPERATURE: 97 F | DIASTOLIC BLOOD PRESSURE: 64 MMHG | RESPIRATION RATE: 18 BRPM | HEART RATE: 78 BPM

## 2023-09-16 DIAGNOSIS — R30.0 DYSURIA: Primary | ICD-10-CM

## 2023-09-16 DIAGNOSIS — E11.65 TYPE 2 DIABETES MELLITUS WITH HYPERGLYCEMIA, UNSPECIFIED WHETHER LONG TERM INSULIN USE (HCC): ICD-10-CM

## 2023-09-16 LAB
#MXD IC: 0.6 X10ˆ3/UL (ref 0.1–1)
BILIRUB UR QL STRIP: NEGATIVE
BUN BLD-MCNC: 12 MG/DL (ref 7–18)
CHLORIDE BLD-SCNC: 97 MMOL/L (ref 98–112)
CLARITY UR: CLEAR
CO2 BLD-SCNC: 28 MMOL/L (ref 21–32)
COLOR UR: YELLOW
CREAT BLD-MCNC: 0.7 MG/DL
EGFRCR SERPLBLD CKD-EPI 2021: 92 ML/MIN/1.73M2 (ref 60–?)
GLUCOSE BLD-MCNC: 260 MG/DL (ref 70–99)
GLUCOSE UR STRIP-MCNC: >=1000 MG/DL
HCT VFR BLD AUTO: 45.7 %
HCT VFR BLD CALC: 49 %
HGB BLD-MCNC: 14.3 G/DL
HGB UR QL STRIP: NEGATIVE
ISTAT IONIZED CALCIUM FOR CHEM 8: 1.15 MMOL/L (ref 1.12–1.32)
KETONES UR STRIP-MCNC: NEGATIVE MG/DL
LEUKOCYTE ESTERASE UR QL STRIP: NEGATIVE
LYMPHOCYTES # BLD AUTO: 1.4 X10ˆ3/UL (ref 1–4)
LYMPHOCYTES NFR BLD AUTO: 15.9 %
MCH RBC QN AUTO: 27.7 PG (ref 26–34)
MCHC RBC AUTO-ENTMCNC: 31.3 G/DL (ref 31–37)
MCV RBC AUTO: 88.6 FL (ref 80–100)
MIXED CELL %: 7 %
NEUTROPHILS # BLD AUTO: 6.7 X10ˆ3/UL (ref 1.5–7.7)
NEUTROPHILS NFR BLD AUTO: 77.1 %
NITRITE UR QL STRIP: NEGATIVE
PH UR STRIP: 5.5 [PH]
PLATELET # BLD AUTO: 221 X10ˆ3/UL (ref 150–450)
POTASSIUM BLD-SCNC: 3.6 MMOL/L (ref 3.6–5.1)
PROT UR STRIP-MCNC: NEGATIVE MG/DL
RBC # BLD AUTO: 5.16 X10ˆ6/UL
SODIUM BLD-SCNC: 136 MMOL/L (ref 136–145)
SP GR UR STRIP: 1.01
UROBILINOGEN UR STRIP-ACNC: <2 MG/DL
WBC # BLD AUTO: 8.7 X10ˆ3/UL (ref 4–11)

## 2023-09-16 PROCEDURE — 81002 URINALYSIS NONAUTO W/O SCOPE: CPT

## 2023-09-16 PROCEDURE — 87086 URINE CULTURE/COLONY COUNT: CPT | Performed by: EMERGENCY MEDICINE

## 2023-09-16 PROCEDURE — 85025 COMPLETE CBC W/AUTO DIFF WBC: CPT | Performed by: EMERGENCY MEDICINE

## 2023-09-16 PROCEDURE — 36415 COLL VENOUS BLD VENIPUNCTURE: CPT

## 2023-09-16 PROCEDURE — 99214 OFFICE O/P EST MOD 30 MIN: CPT

## 2023-09-16 PROCEDURE — 99204 OFFICE O/P NEW MOD 45 MIN: CPT

## 2023-09-16 PROCEDURE — 80047 BASIC METABLC PNL IONIZED CA: CPT

## 2023-09-16 RX ORDER — CEPHALEXIN 500 MG/1
500 CAPSULE ORAL 3 TIMES DAILY
Qty: 21 CAPSULE | Refills: 0 | Status: SHIPPED | OUTPATIENT
Start: 2023-09-16 | End: 2023-09-23

## 2023-09-18 DIAGNOSIS — E11.9 DIABETES MELLITUS WITHOUT COMPLICATION (HCC): ICD-10-CM

## 2023-09-18 RX ORDER — METFORMIN HYDROCHLORIDE 500 MG/1
1500 TABLET, EXTENDED RELEASE ORAL
Qty: 270 TABLET | Refills: 0 | Status: SHIPPED | OUTPATIENT
Start: 2023-09-18

## 2023-09-19 DIAGNOSIS — E78.2 MIXED HYPERLIPIDEMIA: ICD-10-CM

## 2023-09-20 RX ORDER — ATORVASTATIN CALCIUM 20 MG/1
20 TABLET, FILM COATED ORAL DAILY
Qty: 90 TABLET | Refills: 0 | Status: SHIPPED | OUTPATIENT
Start: 2023-09-20

## 2023-10-02 ENCOUNTER — TELEPHONE (OUTPATIENT)
Dept: CARDIOLOGY | Age: 73
End: 2023-10-02

## 2023-10-02 ENCOUNTER — ANTI-COAG (OUTPATIENT)
Dept: CARDIOLOGY | Age: 73
End: 2023-10-02

## 2023-10-02 DIAGNOSIS — I74.3 EMBOLISM AND THROMBOSIS OF ARTERIES OF LOWER EXTREMITY (CMD): Primary | ICD-10-CM

## 2023-10-02 DIAGNOSIS — Z79.01 LONG TERM (CURRENT) USE OF ANTICOAGULANTS: ICD-10-CM

## 2023-10-03 ENCOUNTER — TELEPHONE (OUTPATIENT)
Dept: CARDIOLOGY | Age: 73
End: 2023-10-03

## 2023-10-03 DIAGNOSIS — I10 ESSENTIAL HYPERTENSION: ICD-10-CM

## 2023-10-03 RX ORDER — WARFARIN SODIUM 5 MG/1
TABLET ORAL
Qty: 30 TABLET | Refills: 0 | Status: SHIPPED | OUTPATIENT
Start: 2023-10-03 | End: 2023-10-30

## 2023-10-04 RX ORDER — FELODIPINE 10 MG/1
10 TABLET, EXTENDED RELEASE ORAL DAILY
Qty: 90 TABLET | Refills: 0 | Status: SHIPPED | OUTPATIENT
Start: 2023-10-04

## 2023-10-12 DIAGNOSIS — M19.071 OSTEOARTHRITIS OF RIGHT ANKLE, UNSPECIFIED OSTEOARTHRITIS TYPE: ICD-10-CM

## 2023-10-12 DIAGNOSIS — M05.741 RHEUMATOID ARTHRITIS INVOLVING BOTH HANDS WITH POSITIVE RHEUMATOID FACTOR (HCC): ICD-10-CM

## 2023-10-12 DIAGNOSIS — F32.A DEPRESSION, UNSPECIFIED DEPRESSION TYPE: ICD-10-CM

## 2023-10-12 DIAGNOSIS — M05.742 RHEUMATOID ARTHRITIS INVOLVING BOTH HANDS WITH POSITIVE RHEUMATOID FACTOR (HCC): ICD-10-CM

## 2023-10-12 RX ORDER — FLUOXETINE HYDROCHLORIDE 40 MG/1
40 CAPSULE ORAL DAILY
Qty: 90 CAPSULE | Refills: 0 | Status: SHIPPED | OUTPATIENT
Start: 2023-10-12

## 2023-10-12 RX ORDER — HYDROCODONE BITARTRATE AND ACETAMINOPHEN 5; 325 MG/1; MG/1
1 TABLET ORAL EVERY 6 HOURS PRN
Qty: 120 TABLET | Refills: 0 | Status: SHIPPED | OUTPATIENT
Start: 2023-10-12

## 2023-10-12 NOTE — TELEPHONE ENCOUNTER
Fluoxetine 40 mg  Filled 6-14-23  Qty 90  0 refills  No future appointments. LOV 6-14-23 SM    Norco 5-325 mg  Filled 9-11-23  Qty 120  0 refills  No future appointments.   LOV 6-14-23 SM

## 2023-10-17 ENCOUNTER — TELEPHONE (OUTPATIENT)
Dept: CARDIOLOGY | Age: 73
End: 2023-10-17

## 2023-10-17 ENCOUNTER — ANTI-COAG (OUTPATIENT)
Dept: CARDIOLOGY | Age: 73
End: 2023-10-17

## 2023-10-17 DIAGNOSIS — Z79.01 LONG TERM (CURRENT) USE OF ANTICOAGULANTS: ICD-10-CM

## 2023-10-17 DIAGNOSIS — I74.3 EMBOLISM AND THROMBOSIS OF ARTERIES OF LOWER EXTREMITY (CMD): Primary | ICD-10-CM

## 2023-10-22 ENCOUNTER — NURSE ONLY (OUTPATIENT)
Dept: LAB | Age: 73
End: 2023-10-22
Attending: INTERNAL MEDICINE

## 2023-10-22 ENCOUNTER — LAB ENCOUNTER (OUTPATIENT)
Dept: LAB | Age: 73
End: 2023-10-22
Attending: FAMILY MEDICINE

## 2023-10-22 DIAGNOSIS — Z79.01 LONG TERM (CURRENT) USE OF ANTICOAGULANTS: ICD-10-CM

## 2023-10-22 DIAGNOSIS — E11.9 DIABETES MELLITUS WITHOUT COMPLICATION (HCC): ICD-10-CM

## 2023-10-22 DIAGNOSIS — I74.3 EMBOLISM AND THROMBOSIS OF ARTERIES OF LOWER EXTREMITY (HCC): ICD-10-CM

## 2023-10-22 LAB
INR BLDC: 2.9 (ref 0.9–1.1)
INR PPP: 2.9

## 2023-10-22 PROCEDURE — 85610 PROTHROMBIN TIME: CPT

## 2023-10-22 PROCEDURE — 36415 COLL VENOUS BLD VENIPUNCTURE: CPT

## 2023-10-22 PROCEDURE — 83036 HEMOGLOBIN GLYCOSYLATED A1C: CPT | Performed by: FAMILY MEDICINE

## 2023-10-23 ENCOUNTER — ANTI-COAG (OUTPATIENT)
Dept: CARDIOLOGY | Age: 73
End: 2023-10-23

## 2023-10-23 DIAGNOSIS — I74.3 EMBOLISM AND THROMBOSIS OF ARTERIES OF LOWER EXTREMITY (CMD): Primary | ICD-10-CM

## 2023-10-23 DIAGNOSIS — Z79.01 LONG TERM (CURRENT) USE OF ANTICOAGULANTS: ICD-10-CM

## 2023-10-23 LAB
EST. AVERAGE GLUCOSE BLD GHB EST-MCNC: 200 MG/DL (ref 68–126)
HBA1C MFR BLD: 8.6 % (ref ?–5.7)

## 2023-10-27 ENCOUNTER — TELEPHONE (OUTPATIENT)
Dept: INTERNAL MEDICINE CLINIC | Facility: CLINIC | Age: 73
End: 2023-10-27

## 2023-10-27 DIAGNOSIS — E11.9 DIABETES MELLITUS WITHOUT COMPLICATION (HCC): ICD-10-CM

## 2023-10-27 NOTE — TELEPHONE ENCOUNTER
Called patient to see if she is still atking Farxiga--picked up then hung up phone--will call again 10/28/2023    Requesting    Name from pharmacy: [address] 5MG TABLETS         Will file in chart as: FARXIGA 5 MG Oral Tab    Sig: TAKE 1 TABLET(5 MG) BY MOUTH DAILY    Disp: 28 tablet    Refills: 3 (Pharmacy requested: Not specified)    Start: 10/27/2023    Class: Normal    Non-formulary For: Diabetes mellitus without complication ([facility] [address]    Last ordered: 3 months ago (7/9/2023) by IVETTE Cabrera    Last refill: 9/27/2023    Rx #: [id number]    Diabetic Medication Protocol Spgqds53/27/2023 12:34 PM   Protocol Details Last HgBA1C < 7.5    HgBA1C procedure resulted in past 6 months    Microalbumin procedure in past 12 months or taking ACE/ARB    Appointment in past 6 or next 3 months        LOV: 6/14/2023  RTC: none noted   Last Relevant Labs: 10/22/2023  Filled: 9/27/2023 #28 with 0 refills    No future appointments.

## 2023-10-27 NOTE — TELEPHONE ENCOUNTER
Patient called in stating that she received a mcm from Tomer Neves but was unable to replay back. Patient states that she is currently still taking [place] and would like to know if she can get a larger dose, so she wouldn't have to go every month to  medication. Please advise.

## 2023-10-29 DIAGNOSIS — I10 ESSENTIAL HYPERTENSION: ICD-10-CM

## 2023-10-30 DIAGNOSIS — I74.3 EMBOLISM AND THROMBOSIS OF ARTERIES OF LOWER EXTREMITY (CMD): Primary | ICD-10-CM

## 2023-10-30 DIAGNOSIS — Z79.01 LONG TERM (CURRENT) USE OF ANTICOAGULANTS: ICD-10-CM

## 2023-10-30 RX ORDER — TRIAMTERENE AND HYDROCHLOROTHIAZIDE 37.5; 25 MG/1; MG/1
1 TABLET ORAL DAILY
Qty: 90 TABLET | Refills: 0 | Status: SHIPPED | OUTPATIENT
Start: 2023-10-30

## 2023-10-30 RX ORDER — WARFARIN SODIUM 5 MG/1
5 TABLET ORAL DAILY
Qty: 90 TABLET | Refills: 0 | Status: SHIPPED | OUTPATIENT
Start: 2023-10-30

## 2023-10-30 NOTE — TELEPHONE ENCOUNTER
Triamterene- hydrochlorothiazide 37.5-25 mg  Filled 7-19-23  Qty 90  0 refills  No future appointments.   LOV 6-14-23 SM

## 2023-11-14 DIAGNOSIS — M05.741 RHEUMATOID ARTHRITIS INVOLVING BOTH HANDS WITH POSITIVE RHEUMATOID FACTOR (HCC): ICD-10-CM

## 2023-11-14 DIAGNOSIS — M05.742 RHEUMATOID ARTHRITIS INVOLVING BOTH HANDS WITH POSITIVE RHEUMATOID FACTOR (HCC): ICD-10-CM

## 2023-11-14 DIAGNOSIS — M19.071 OSTEOARTHRITIS OF RIGHT ANKLE, UNSPECIFIED OSTEOARTHRITIS TYPE: ICD-10-CM

## 2023-11-14 RX ORDER — HYDROCODONE BITARTRATE AND ACETAMINOPHEN 5; 325 MG/1; MG/1
1 TABLET ORAL EVERY 6 HOURS PRN
Qty: 120 TABLET | Refills: 0 | Status: SHIPPED | OUTPATIENT
Start: 2023-11-14

## 2023-11-24 ENCOUNTER — ANTI-COAG (OUTPATIENT)
Dept: CARDIOLOGY | Age: 73
End: 2023-11-24

## 2023-11-24 DIAGNOSIS — I74.3 EMBOLISM AND THROMBOSIS OF ARTERIES OF LOWER EXTREMITY (CMD): Primary | ICD-10-CM

## 2023-11-24 DIAGNOSIS — Z79.01 LONG TERM (CURRENT) USE OF ANTICOAGULANTS: ICD-10-CM

## 2023-11-30 ENCOUNTER — ANTI-COAG (OUTPATIENT)
Dept: CARDIOLOGY | Age: 73
End: 2023-11-30

## 2023-11-30 DIAGNOSIS — I74.3 EMBOLISM AND THROMBOSIS OF ARTERIES OF LOWER EXTREMITY (CMD): Primary | ICD-10-CM

## 2023-11-30 DIAGNOSIS — Z79.01 LONG TERM (CURRENT) USE OF ANTICOAGULANTS: ICD-10-CM

## 2023-12-07 ENCOUNTER — ANTI-COAG (OUTPATIENT)
Dept: CARDIOLOGY | Age: 73
End: 2023-12-07

## 2023-12-07 DIAGNOSIS — Z79.01 LONG TERM (CURRENT) USE OF ANTICOAGULANTS: ICD-10-CM

## 2023-12-07 DIAGNOSIS — I74.3 EMBOLISM AND THROMBOSIS OF ARTERIES OF LOWER EXTREMITY (CMD): Primary | ICD-10-CM

## 2023-12-14 ENCOUNTER — ANTI-COAG (OUTPATIENT)
Dept: CARDIOLOGY | Age: 73
End: 2023-12-14

## 2023-12-14 DIAGNOSIS — Z79.01 LONG TERM (CURRENT) USE OF ANTICOAGULANTS: ICD-10-CM

## 2023-12-14 DIAGNOSIS — I74.3 EMBOLISM AND THROMBOSIS OF ARTERIES OF LOWER EXTREMITY (CMD): Primary | ICD-10-CM

## 2023-12-18 DIAGNOSIS — M19.071 OSTEOARTHRITIS OF RIGHT ANKLE, UNSPECIFIED OSTEOARTHRITIS TYPE: ICD-10-CM

## 2023-12-18 DIAGNOSIS — E78.2 MIXED HYPERLIPIDEMIA: ICD-10-CM

## 2023-12-18 DIAGNOSIS — E11.9 DIABETES MELLITUS WITHOUT COMPLICATION (HCC): ICD-10-CM

## 2023-12-18 DIAGNOSIS — M05.741 RHEUMATOID ARTHRITIS INVOLVING BOTH HANDS WITH POSITIVE RHEUMATOID FACTOR (HCC): ICD-10-CM

## 2023-12-18 DIAGNOSIS — M05.742 RHEUMATOID ARTHRITIS INVOLVING BOTH HANDS WITH POSITIVE RHEUMATOID FACTOR (HCC): ICD-10-CM

## 2023-12-18 RX ORDER — METFORMIN HYDROCHLORIDE 500 MG/1
1500 TABLET, EXTENDED RELEASE ORAL
Qty: 90 TABLET | Refills: 0 | Status: SHIPPED | OUTPATIENT
Start: 2023-12-18

## 2023-12-18 RX ORDER — ATORVASTATIN CALCIUM 20 MG/1
20 TABLET, FILM COATED ORAL DAILY
Qty: 90 TABLET | Refills: 0 | OUTPATIENT
Start: 2023-12-18

## 2023-12-18 RX ORDER — METFORMIN HYDROCHLORIDE 500 MG/1
1500 TABLET, EXTENDED RELEASE ORAL
Qty: 270 TABLET | Refills: 0 | OUTPATIENT
Start: 2023-12-18

## 2023-12-18 RX ORDER — HYDROCODONE BITARTRATE AND ACETAMINOPHEN 5; 325 MG/1; MG/1
1 TABLET ORAL EVERY 6 HOURS PRN
Qty: 120 TABLET | Refills: 0 | Status: SHIPPED | OUTPATIENT
Start: 2023-12-18

## 2023-12-18 RX ORDER — ATORVASTATIN CALCIUM 20 MG/1
20 TABLET, FILM COATED ORAL DAILY
Qty: 30 TABLET | Refills: 0 | Status: SHIPPED | OUTPATIENT
Start: 2023-12-18

## 2023-12-18 NOTE — TELEPHONE ENCOUNTER
1 month sent in for time being, pt is due for repeat lab work to check levels.      Metformin  mg    Diabetic Medication Protocol Cocjnv9812/18/2023 12:05 PM   Protocol Details Last HgBA1C < 7.5      Filled 9-18-23  Qty 270  0 refills  Future Appointments   Date Time Provider [facility]   1/10/2024 11:00 AM IVETTE Tang EM[facility] 8 [facility] [place]   LOV 6- SM    Atorvastatin 20 mg  Filled 9-20-23  Qty 90  0 refills  Future Appointments   Date Time Provider [facility]   1/10/2024 11:00 AM IVETTE Tang EM[facility] 8 [facility] [place]   LOV 6-   Labs Due for repeat lab work

## 2023-12-18 NOTE — TELEPHONE ENCOUNTER
Norco  Filled 11-14-23  Qty 120  0 refills  Future Appointments   Date Time Provider [facility]   1/10/2024 11:00 AM [facility] Garcia [facility] 6-14-23

## 2023-12-22 ENCOUNTER — TELEPHONE (OUTPATIENT)
Dept: CARDIOLOGY | Age: 73
End: 2023-12-22

## 2023-12-22 ENCOUNTER — ANTI-COAG (OUTPATIENT)
Dept: CARDIOLOGY | Age: 73
End: 2023-12-22

## 2023-12-22 DIAGNOSIS — I74.3 EMBOLISM AND THROMBOSIS OF ARTERIES OF LOWER EXTREMITY (CMD): Primary | ICD-10-CM

## 2023-12-22 DIAGNOSIS — Z79.01 LONG TERM (CURRENT) USE OF ANTICOAGULANTS: ICD-10-CM

## 2023-12-28 ENCOUNTER — TELEPHONE (OUTPATIENT)
Dept: INTERNAL MEDICINE CLINIC | Facility: CLINIC | Age: 73
End: 2023-12-28

## 2023-12-28 DIAGNOSIS — M19.071 OSTEOARTHRITIS OF RIGHT ANKLE, UNSPECIFIED OSTEOARTHRITIS TYPE: ICD-10-CM

## 2023-12-28 DIAGNOSIS — I10 ESSENTIAL HYPERTENSION: Primary | ICD-10-CM

## 2023-12-28 NOTE — TELEPHONE ENCOUNTER
Is she going to  order from our office or does she want us to fax it in somewhere?  Does she know where she will be getting device from?   The facility typically will have the specific CPT code I assume they provide it to insurance when they try to run it through her insurance plan.   LMTCB x 1 to notify patient.     LOV:   06/14/2023 Fernanda Almonte RTC 6 months  9. Osteoarthritis of right ankle, unspecified osteoarthritis type  - continue medication.  - will put order in for upright walker.  - HYDROcodone-acetaminophen 5-325 MG Oral Tab; Take 1 tablet by mouth every 6 (six) hours as needed.  Dispense: 120 tablet; Refill: 0

## 2023-12-28 NOTE — TELEPHONE ENCOUNTER
Patient called in requesting an order for a walker. Pt states she did get the okay from her insurance to proceed.     Upright Rollator Walker Heavy duty    Ref#[id number]    Ph:[phone]    Insurance is just needing the procedure code from us.

## 2024-01-02 NOTE — TELEPHONE ENCOUNTER
Spoke with patient via phone.  Explained no fax received from [facility] for potential vendors.  Explained options patient has for obtaining walker.  Advised patient to contact insurance and ask them to provide name/address/phone/fax# for two local DME vendors that her insurance covers to obtain walker.  Once she has info, can either pick-up up script from office and take directly to selected vendor, or can provide us with information (listed above) and our office will fax order to vendor.  Patient verbalized understanding and confirms she will contact insurance and keep us updated.  Denies any further questions

## 2024-01-05 ENCOUNTER — ANTI-COAG (OUTPATIENT)
Dept: CARDIOLOGY | Age: 74
End: 2024-01-05

## 2024-01-05 DIAGNOSIS — I74.3 EMBOLISM AND THROMBOSIS OF ARTERIES OF LOWER EXTREMITY (CMD): Primary | ICD-10-CM

## 2024-01-05 DIAGNOSIS — Z79.01 LONG TERM (CURRENT) USE OF ANTICOAGULANTS: ICD-10-CM

## 2024-01-07 ENCOUNTER — LAB ENCOUNTER (OUTPATIENT)
Dept: LAB | Age: 74
End: 2024-01-07
Attending: FAMILY MEDICINE
Payer: COMMERCIAL

## 2024-01-07 ENCOUNTER — NURSE ONLY (OUTPATIENT)
Dept: LAB | Age: 74
End: 2024-01-07
Attending: FAMILY MEDICINE
Payer: COMMERCIAL

## 2024-01-07 ENCOUNTER — TELEPHONE (OUTPATIENT)
Dept: TELEHEALTH | Age: 74
End: 2024-01-07

## 2024-01-07 DIAGNOSIS — E11.9 DIABETES MELLITUS WITHOUT COMPLICATION (HCC): ICD-10-CM

## 2024-01-07 DIAGNOSIS — E78.5 HYPERLIPIDEMIA, UNSPECIFIED HYPERLIPIDEMIA TYPE: ICD-10-CM

## 2024-01-07 DIAGNOSIS — Z79.01 LONG TERM (CURRENT) USE OF ANTICOAGULANTS: ICD-10-CM

## 2024-01-07 DIAGNOSIS — I74.3 EMBOLISM AND THROMBOSIS OF ARTERIES OF LOWER EXTREMITY (CMD): Primary | ICD-10-CM

## 2024-01-07 DIAGNOSIS — I74.3 EMBOLISM AND THROMBOSIS OF ARTERIES OF LOWER EXTREMITY (HCC): ICD-10-CM

## 2024-01-07 LAB
CHOLEST SERPL-MCNC: 145 MG/DL (ref ?–200)
EST. AVERAGE GLUCOSE BLD GHB EST-MCNC: 183 MG/DL (ref 68–126)
FASTING PATIENT LIPID ANSWER: YES
HBA1C MFR BLD: 8 % (ref ?–5.7)
HDLC SERPL-MCNC: 55 MG/DL (ref 40–59)
INR BLD: 2.87 (ref 0.8–1.2)
INR BLDC: 4 (ref 0.9–1.1)
INR PPP: 2.87
LDLC SERPL CALC-MCNC: 64 MG/DL (ref ?–100)
NONHDLC SERPL-MCNC: 90 MG/DL (ref ?–130)
PROTHROMBIN TIME: 31.9 SECONDS (ref 11.6–14.8)
TRIGL SERPL-MCNC: 156 MG/DL (ref 30–149)
VLDLC SERPL CALC-MCNC: 23 MG/DL (ref 0–30)

## 2024-01-07 PROCEDURE — 3052F HG A1C>EQUAL 8.0%<EQUAL 9.0%: CPT | Performed by: FAMILY MEDICINE

## 2024-01-07 PROCEDURE — 83036 HEMOGLOBIN GLYCOSYLATED A1C: CPT

## 2024-01-07 PROCEDURE — 85610 PROTHROMBIN TIME: CPT

## 2024-01-07 PROCEDURE — 80061 LIPID PANEL: CPT

## 2024-01-07 PROCEDURE — 36415 COLL VENOUS BLD VENIPUNCTURE: CPT

## 2024-01-08 ENCOUNTER — TELEPHONE (OUTPATIENT)
Dept: INTERNAL MEDICINE CLINIC | Facility: CLINIC | Age: 74
End: 2024-01-08

## 2024-01-08 ENCOUNTER — ANTI-COAG (OUTPATIENT)
Dept: CARDIOLOGY | Age: 74
End: 2024-01-08

## 2024-01-08 DIAGNOSIS — E11.65 TYPE 2 DIABETES MELLITUS WITH HYPERGLYCEMIA, WITHOUT LONG-TERM CURRENT USE OF INSULIN (HCC): Primary | ICD-10-CM

## 2024-01-08 DIAGNOSIS — I74.3 EMBOLISM AND THROMBOSIS OF ARTERIES OF LOWER EXTREMITY (CMD): Primary | ICD-10-CM

## 2024-01-08 DIAGNOSIS — I10 ESSENTIAL HYPERTENSION: ICD-10-CM

## 2024-01-08 DIAGNOSIS — Z79.01 LONG TERM (CURRENT) USE OF ANTICOAGULANTS: ICD-10-CM

## 2024-01-08 RX ORDER — FELODIPINE 10 MG/1
TABLET, EXTENDED RELEASE ORAL
Qty: 90 TABLET | Refills: 0 | Status: SHIPPED | OUTPATIENT
Start: 2024-01-08

## 2024-01-08 NOTE — TELEPHONE ENCOUNTER
Felodipine ER 10 mg  Filled 10-4-23  Qty 90  0 refills  Future Appointments   Date Time Provider Department Center   1/10/2024 11:00 AM Fernanda Almonte APRN EMG 8 [facility] 6-14-23

## 2024-01-08 NOTE — TELEPHONE ENCOUNTER
Spoke to patient and relayed results/recommendations below. Patient requesting 90 day rx on new dose Farxiga, pended.   Patient states will get diabetic educator info at her upcoming appt on 01/10      IVETTE Zepeda  1/8/2024 10:20 AM CST Back to Top      Hgba1c is improving 8.0 but is still up. Continue metformin and increase the Farxiga to 10 mg daily. I would like her to see Eugenie Yu or any one in that group to help get her better control of her DM.  Trigs up a little. Pt to take her cholesterol med and watch her diet

## 2024-01-09 DIAGNOSIS — F32.A DEPRESSION, UNSPECIFIED DEPRESSION TYPE: ICD-10-CM

## 2024-01-09 RX ORDER — FLUOXETINE HYDROCHLORIDE 40 MG/1
40 CAPSULE ORAL DAILY
Qty: 90 CAPSULE | Refills: 0 | Status: SHIPPED | OUTPATIENT
Start: 2024-01-09

## 2024-01-09 NOTE — TELEPHONE ENCOUNTER
Fluoxetine 40 mg  Filled 10-12-23  Qty 90  0 refills  Future Appointments   Date Time Provider Department Center   1/10/2024 11:00 AM Fernanda Almonte APRN [facility] 06-

## 2024-01-10 ENCOUNTER — NURSE ONLY (OUTPATIENT)
Dept: INTERNAL MEDICINE CLINIC | Facility: CLINIC | Age: 74
End: 2024-01-10
Payer: COMMERCIAL

## 2024-01-10 ENCOUNTER — OFFICE VISIT (OUTPATIENT)
Dept: INTERNAL MEDICINE CLINIC | Facility: CLINIC | Age: 74
End: 2024-01-10
Payer: COMMERCIAL

## 2024-01-10 VITALS
DIASTOLIC BLOOD PRESSURE: 82 MMHG | SYSTOLIC BLOOD PRESSURE: 136 MMHG | WEIGHT: 267.63 LBS | HEART RATE: 82 BPM | BODY MASS INDEX: 50.53 KG/M2 | HEIGHT: 61 IN | RESPIRATION RATE: 20 BRPM | OXYGEN SATURATION: 97 % | TEMPERATURE: 97 F

## 2024-01-10 DIAGNOSIS — E11.65 TYPE 2 DIABETES MELLITUS WITH HYPERGLYCEMIA, WITHOUT LONG-TERM CURRENT USE OF INSULIN (HCC): ICD-10-CM

## 2024-01-10 DIAGNOSIS — E78.2 MIXED HYPERLIPIDEMIA: ICD-10-CM

## 2024-01-10 DIAGNOSIS — Z78.0 POSTMENOPAUSAL ESTROGEN DEFICIENCY: ICD-10-CM

## 2024-01-10 DIAGNOSIS — Z00.00 ROUTINE GENERAL MEDICAL EXAMINATION AT A HEALTH CARE FACILITY: Primary | ICD-10-CM

## 2024-01-10 DIAGNOSIS — L30.4 INTERTRIGO: ICD-10-CM

## 2024-01-10 DIAGNOSIS — F32.A DEPRESSION, UNSPECIFIED DEPRESSION TYPE: ICD-10-CM

## 2024-01-10 DIAGNOSIS — E11.9 DIABETES MELLITUS WITHOUT COMPLICATION (HCC): ICD-10-CM

## 2024-01-10 DIAGNOSIS — I74.3 EMBOLISM AND THROMBOSIS OF ARTERIES OF LOWER EXTREMITIES (HCC): ICD-10-CM

## 2024-01-10 DIAGNOSIS — G89.29 CHRONIC MIDLINE LOW BACK PAIN WITHOUT SCIATICA: ICD-10-CM

## 2024-01-10 DIAGNOSIS — E66.01 MORBID OBESITY (HCC): ICD-10-CM

## 2024-01-10 DIAGNOSIS — E55.9 VITAMIN D DEFICIENCY: ICD-10-CM

## 2024-01-10 DIAGNOSIS — Z12.11 ENCOUNTER FOR SCREENING FECAL OCCULT BLOOD TESTING: ICD-10-CM

## 2024-01-10 DIAGNOSIS — M54.50 CHRONIC MIDLINE LOW BACK PAIN WITHOUT SCIATICA: ICD-10-CM

## 2024-01-10 DIAGNOSIS — Z12.31 ENCOUNTER FOR SCREENING MAMMOGRAM FOR BREAST CANCER: ICD-10-CM

## 2024-01-10 DIAGNOSIS — I10 ESSENTIAL HYPERTENSION: ICD-10-CM

## 2024-01-10 DIAGNOSIS — M05.9 RHEUMATOID ARTHRITIS WITH POSITIVE RHEUMATOID FACTOR, INVOLVING UNSPECIFIED SITE (HCC): ICD-10-CM

## 2024-01-10 PROCEDURE — 3075F SYST BP GE 130 - 139MM HG: CPT | Performed by: FAMILY MEDICINE

## 2024-01-10 PROCEDURE — 3072F LOW RISK FOR RETINOPATHY: CPT | Performed by: FAMILY MEDICINE

## 2024-01-10 PROCEDURE — 99397 PER PM REEVAL EST PAT 65+ YR: CPT | Performed by: FAMILY MEDICINE

## 2024-01-10 PROCEDURE — 3079F DIAST BP 80-89 MM HG: CPT | Performed by: FAMILY MEDICINE

## 2024-01-10 PROCEDURE — 3008F BODY MASS INDEX DOCD: CPT | Performed by: FAMILY MEDICINE

## 2024-01-10 PROCEDURE — 92229 IMG RTA DETC/MNTR DS POC ALY: CPT | Performed by: FAMILY MEDICINE

## 2024-01-10 PROCEDURE — 2033F EYE IMG VALID W/O RTNOPTHY: CPT | Performed by: FAMILY MEDICINE

## 2024-01-10 PROCEDURE — 99213 OFFICE O/P EST LOW 20 MIN: CPT | Performed by: FAMILY MEDICINE

## 2024-01-10 RX ORDER — PERPHENAZINE 16 MG/1
TABLET, FILM COATED ORAL
Qty: 200 STRIP | Refills: 1 | Status: SHIPPED | OUTPATIENT
Start: 2024-01-10

## 2024-01-10 RX ORDER — NYSTATIN 100000 U/G
1 CREAM TOPICAL 2 TIMES DAILY
Qty: 30 G | Refills: 1 | Status: SHIPPED | OUTPATIENT
Start: 2024-01-10

## 2024-01-10 RX ORDER — WARFARIN SODIUM 5 MG/1
5 TABLET ORAL DAILY
Qty: 90 TABLET | Refills: 1 | Status: SHIPPED | OUTPATIENT
Start: 2024-01-10

## 2024-01-10 RX ORDER — LANCETS
1 EACH MISCELLANEOUS 2 TIMES DAILY
Qty: 200 EACH | Refills: 1 | Status: SHIPPED | OUTPATIENT
Start: 2024-01-10

## 2024-01-10 RX ORDER — ATORVASTATIN CALCIUM 20 MG/1
20 TABLET, FILM COATED ORAL DAILY
Qty: 90 TABLET | Refills: 1 | Status: SHIPPED | OUTPATIENT
Start: 2024-01-10

## 2024-01-10 RX ORDER — TRIAMTERENE AND HYDROCHLOROTHIAZIDE 37.5; 25 MG/1; MG/1
1 TABLET ORAL DAILY
Qty: 90 TABLET | Refills: 0 | Status: SHIPPED | OUTPATIENT
Start: 2024-01-10

## 2024-01-10 NOTE — PROGRESS NOTES
HPI:   Amee Bermudez is a 73 year old female who presents for a complete physical exam. Symptoms: denies discharge, itching, burning or dysuria, is menopausal.    Regular SBE- yes Sexually active- occ,  Contraception- post menopausal. STD history- none    Patient is aware she is here for a physical today but would also like to discuss other issues,or get medications refilled today. Patient aware that these concerns my not be covered by their Px.     Amee Bermudez is a 71 year old female who presents for recheck of her diabetes. Pt's weight is down 12 pounds since her last year  Patient’s FBS have been 110-140's.  Pt tests her blood few times per week.  Last visit with ophthalmologist was 5/23/18. Pt aware she needs to go but will do her retinal exam her today  Pt has been checking her feet on a regular basis. Pt denies any tingling of the feet.      Patient presents for recheck of their hypertension/hyperlipdemia. Pt has been taking medications as instructed, no medication side effects, home BP monitoring has not been done  Currently asymptomatic, no chest pains, jaw pains, arm pains. No headaches, dizziness or edema.  No SOB on exertion or rest.  Pt has been following a low fat diet and has been walking some.     Pt is here for a recheck of depression. Has been tolerating the meds well. Denies any side effects from the meds. Mood has been good. Patient's appetite is good.Pt denies headaches,tics,or insomnia. Pt has felt a little anxious lately but nothing she can not handle.No suicidal ideations. Would like to continue the same medication.     The patient is here to recheck her RA/OA. Pt has pain in  the following joints: hands,bilateral knees /ankles and back. Pt has had these symptoms for years and has seen Rheumatology in the past. Pt has been using the following medication: Norco 1-3 per day. Pain issues controlled by Norco since she cannot take nsaids because of warfarin use.  Pt describes her sx's as good  controlled. Symptoms are worsened by increase walking. Pt has become more sedentary since she retired and knows she has to get moving more.  Pt has noticed more back pain and needs an order for a walker. Script given to take to medical supply company. Discussed doing a lumbar xray but Pt wants to hold on that for now and if still present in 6 months will get it then.    Pt needs a refill on Nystatin but wants the cream not powder. For her intertrigo.  Screening:  Immunization History   Administered Date(s) Administered    Covid-19 Vaccine Pfizer 30 mcg/0.3 ml 02/08/2021, 03/01/2021, 10/19/2021    FLUZONE 6 months and older PFS 0.5 ml (12694) 02/19/2015    Influenza 11/08/2013    Pneumococcal (Prevnar 13) 03/21/2018    Pneumovax 23 06/25/2020    TDAP 12/17/2020    Zoster Vaccine Live (Zostavax) 05/17/2012    Zoster Vaccine Recombinant Adjuvanted (Shingrix) 12/17/2020, 10/28/2021     Menarche- 15 yr  PAP- aged out  Any abnormal pap smears- 30 years ago  Pregnancies- 1, Live births- 1  Mammogram-  DUE   Any breast cancer- none, or any gynecological cancer- none, any cancers -paternal cousin lung cancer  Labs- 1/7/24- lipids, hgba1c. All other labs due.  DEXA over 65yr-DUE  Flu shot-  allergic to it  Colon- DUE  Glasses/contacts- yes, last exam- due  Dental visits- 2019    Immunization History   Administered Date(s) Administered    Covid-19 Vaccine Pfizer 30 mcg/0.3 ml 02/08/2021, 03/01/2021, 10/19/2021    FLUZONE 6 months and older PFS 0.5 ml (75549) 02/19/2015    Influenza 11/08/2013    Pneumococcal (Prevnar 13) 03/21/2018    Pneumovax 23 06/25/2020    TDAP 12/17/2020    Zoster Vaccine Live (Zostavax) 05/17/2012    Zoster Vaccine Recombinant Adjuvanted (Shingrix) 12/17/2020, 10/28/2021     Wt Readings from Last 6 Encounters:   01/10/24 267 lb 9.6 oz (121.4 kg)   06/14/23 279 lb (126.6 kg)   01/04/23 272 lb 12.8 oz (123.7 kg)   07/12/22 271 lb (122.9 kg)   12/29/21 263 lb 12.8 oz (119.7 kg)   06/14/21 260 lb (117.9 kg)      Body mass index is 50.56 kg/m².     Lab Results   Component Value Date     (H) 06/13/2023     (H) 02/14/2023     (H) 07/12/2022     Lab Results   Component Value Date    CHOLEST 145 01/07/2024    CHOLEST 122 06/13/2023    CHOLEST 145 02/14/2023     Lab Results   Component Value Date    HDL 55 01/07/2024    HDL 56 06/13/2023    HDL 56 02/14/2023     Lab Results   Component Value Date    LDL 64 01/07/2024    LDL 42 06/13/2023    LDL 63 02/14/2023     Lab Results   Component Value Date    AST 29 06/13/2023    AST 29 03/26/2023    AST 43 (H) 02/14/2023     Lab Results   Component Value Date    ALT 48 06/13/2023    ALT 47 03/26/2023    ALT 64 (H) 02/14/2023       Current Outpatient Medications   Medication Sig Dispense Refill    atorvastatin 20 MG Oral Tab Take 1 tablet (20 mg total) by mouth daily. 90 tablet 1    Glucose Blood (CONTOUR NEXT TEST) In Vitro Strip Patient tests her glucose twice a day 200 strip 1    Microlet Lancets Does not apply Misc 1 lancet  by Finger stick route 2 (two) times daily. 200 each 1    Triamterene-HCTZ 37.5-25 MG Oral Tab Take 1 tablet by mouth daily. 90 tablet 0    warfarin 5 MG Oral Tab Take 1 tablet (5 mg total) by mouth daily. 90 tablet 1    nystatin 100,000 Units/g External Cream Apply 1 Application topically 2 (two) times daily. 30 g 1    FLUoxetine HCl 40 MG Oral Cap Take 1 capsule (40 mg total) by mouth daily. 90 capsule 0    FELODIPINE ER 10 MG Oral Tablet 24 Hr TAKE 1 TABLET(10 MG) BY MOUTH DAILY 90 tablet 0    dapagliflozin (FARXIGA) 10 MG Oral Tab Take 1 tablet (10 mg total) by mouth daily. 90 tablet 0    metFORMIN  MG Oral Tablet 24 Hr TAKE 3 TABLETS(1500 MG) BY MOUTH DAILY WITH BREAKFAST 90 tablet 0    HYDROcodone-acetaminophen 5-325 MG Oral Tab Take 1 tablet by mouth every 6 (six) hours as needed. 120 tablet 0    Nystatin 984166 UNIT/GM External Powder Apply 1 Application topically 4 (four) times daily. (Patient taking differently: Apply 1  Application  topically 4 (four) times daily as needed.) 60 g 1      Past Medical History:   Diagnosis Date    Allergic rhinitis 60 years ago    Anxiety ?    Arthritis 20 yrs ago    Depression     Diabetes (HCC) ?    Hyperlipidemia     Knee osteoarthritis 2014    Obesity     Osteoarthritis     Sleep apnea     Solitary kidney, congenital     Unspecified essential hypertension       Past Surgical History:   Procedure Laterality Date    CATARACT  5yrs ago    CHOLECYSTECTOMY        89    OTHER SURGICAL HISTORY  ,     left femoral angiogram/angioplasty and stent    OTHER SURGICAL HISTORY  age 29    right ankle fx (slipped on ice)    TONSILLECTOMY  67 yrs ago      Family History   Problem Relation Age of Onset    Other (Parkinson's disease) Father     Other (Uremic poisoning) Maternal Grandmother     Heart Disease Paternal Grandmother     Heart Attack Maternal Grandfather         AMI    Other (COPD) Mother       Social History:   Social History     Socioeconomic History    Marital status:    Tobacco Use    Smoking status: Former     Packs/day: 0.00     Years: 15.00     Additional pack years: 0.00     Total pack years: 0.00     Types: Cigarettes     Quit date: 1996     Years since quittin.0    Smokeless tobacco: Never   Vaping Use    Vaping Use: Never used   Substance and Sexual Activity    Alcohol use: Yes     Comment: One in a while, 1 glass of wine    Drug use: Never   Other Topics Concern    Caffeine Concern No    Stress Concern No    Weight Concern No    Special Diet No    Exercise No    Seat Belt Yes     Occ: retired. : yes. Children: 1.   Exercise: three times per week, does sitting exercises .  Diet: watches fats closely and low carb diet     REVIEW OF SYSTEMS:   GENERAL: feels well otherwise  SKIN: denies any unusual skin lesions  EYES:denies blurred vision or double vision  HEENT: denies nasal congestion, sinus pain or ST  LUNGS: denies shortness of breath with  exertion,+FOREST  CARDIOVASCULAR: denies chest pain on exertion,+HTN  GI: denies abdominal pain,denies heartburn  : denies dysuria, vaginal discharge or itching  MUSCULOSKELETAL: + hx back pain,+OA,+RA  NEURO: denies headaches  PSYCHE: + depression   HEMATOLOGIC: denies hx of anemia  ENDOCRINE: denies thyroid history,+DM  ALL/ASTHMA: denies hx of allergy or asthma    EXAM:   /82 (BP Location: Left arm, Patient Position: Sitting, Cuff Size: large)   Pulse 82   Temp 97.4 °F (36.3 °C) (Temporal)   Resp 20   Ht 5' 1\" (1.549 m)   Wt 267 lb 9.6 oz (121.4 kg)   SpO2 97%   BMI 50.56 kg/m²   Body mass index is 50.56 kg/m².   GENERAL: well developed, well nourished,in no apparent distress  SKIN: no rashes,no suspicious lesions  HEENT: atraumatic, normocephalic,ears and throat are clear  EYES:PERRLA, EOMI, normal optic disk,conjunctiva are clear  NECK: supple,no adenopathy,no bruits  CHEST: no chest tenderness  BREAST: declines  LUNGS: clear to auscultation  CARDIO: RRR without murmur  GI: good BS's,no masses, HSM or tenderness  :deferred  MUSCULOSKELETAL: lower back is  mildly tender,FROM of the back. Pt walks with cane.  EXTREMITIES: no cyanosis, clubbing or edema  NEURO: Oriented times three,cranial nerves are intact,motor and sensory are grossly intact    ASSESSMENT AND PLAN:   Amee Bermudez is a 73 year old female who presents for a complete physical exam.  Order put in for mammogram and dexascan. Self breast exam encouraged. Health maintenance, will check fasting Labs.. Pt referred for screening colonoscopy but refuses. Pt will do FIT card instead.  Pt' s weight is Body mass index is 50.56 kg/m²., recommended low fat diet and aerobic exercise 30 minutes three times weekly.  The patient indicates understanding of these issues and agrees to the plan.  The patient is asked to return for CPX in one year.  1. Mixed hyperlipidemia  Pt to continue their medication, increase exercise,  choose better fats,   increase fiber and avoid processed foods.    - atorvastatin 20 MG Oral Tab; Take 1 tablet (20 mg total) by mouth daily.  Dispense: 90 tablet; Refill: 1    2. Type 2 diabetes mellitus with hyperglycemia, without long-term current use of insulin (HCC)  Continue present medications. Check sugars daily or as directed.  Eat a low fat, low carb, low cholesterol diet. Call with sugars less than 70 or greater than 300.   Please see your specialists as recommended.  Keep up with regular eye exams and Check feet daily. Stay on your low salt, diabetic diet.  Stay as active as you can.  See me back as scheduled, every 3-6 months.     - Glucose Blood (CONTOUR NEXT TEST) In Vitro Strip; Patient tests her glucose twice a day  Dispense: 200 strip; Refill: 1  - Microlet Lancets Does not apply Misc; 1 lancet  by Finger stick route 2 (two) times daily.  Dispense: 200 each; Refill: 1  - Diabetic Retinopathy Exam  OU - Both Eyes; Future    3. Essential hypertension  Conservative measures dicussed. Continue medication.  Diet and exercise explained and encouraged.  Fasting labs due.  Home blood pressure monitoring. Pt should measure BP’s two to three times per week. Goal blood pressure at home - < 135/85.     - Triamterene-HCTZ 37.5-25 MG Oral Tab; Take 1 tablet by mouth daily.  Dispense: 90 tablet; Refill: 0    4. Encounter for screening mammogram for breast cancer    - [facility] MIRNA 2D+3D SCREENING BILAT (CPT=77067/46274); Future    5. Postmenopausal estrogen deficiency    - XR DEXA BONE DENSITOMETRY (CPT=77080); Future    6. Rheumatoid arthritis with positive rheumatoid factor, involving unspecified site (HCC)  - stable, continue Norco as needed    7. Embolism and thrombosis of arteries of lower extremities (HCC)  -managed by cardiology  - warfarin 5 MG Oral Tab; Take 1 tablet (5 mg total) by mouth daily.  Dispense: 90 tablet; Refill: 1    8. Morbid obesity (HCC)  Pt to continue to work on her diet    9. Routine general medical examination  at a health care facility    - CBC With Differential With Platelet; Future  - Comp Metabolic Panel (14); Future  - TSH W Reflex To Free T4; Future    10. Vitamin D deficiency  Check level  - Vitamin D; Future    12. Encounter for screening fecal occult blood testing    - Occult Blood, Fecal, FIT Immunoassay; Future    13. Chronic midline low back pain without sciatica  Stable, consider lumbar xray in 6 month    14. Depression, unspecified depression type  Stable, continue medication    15.Intertrigo  - Nystatin cream refilled  Encounter Diagnoses   Name Primary?    Mixed hyperlipidemia     Type 2 diabetes mellitus with hyperglycemia, without long-term current use of insulin (HCC)     Essential hypertension     Encounter for screening mammogram for breast cancer     Postmenopausal estrogen deficiency     Rheumatoid arthritis with positive rheumatoid factor, involving unspecified site (HCC)     Embolism and thrombosis of arteries of lower extremities (HCC)     Morbid obesity (HCC)     Diabetes mellitus without complication (HCC)     Routine general medical examination at a [facility] care facility Yes    Vitamin D deficiency     Encounter for screening fecal occult blood testing     Chronic midline low back pain without sciatica     Depression, unspecified depression type     Intertrigo        Orders Placed This Encounter   Procedures    CBC With Differential With Platelet    Comp Metabolic Panel (14)    TSH W Reflex To Free T4    Vitamin D    Occult Blood, Fecal, FIT Immunoassay    Diabetic Retinopathy Exam  OU - Both Eyes       Meds & Refills for this Visit:  Requested Prescriptions     Signed Prescriptions Disp Refills    atorvastatin 20 MG Oral Tab 90 tablet 1     Sig: Take 1 tablet (20 mg total) by mouth daily.    Glucose Blood (CONTOUR NEXT TEST) In Vitro Strip 200 strip 1     Sig: Patient tests her glucose twice a day    Microlet Lancets Does not apply Misc 200 each 1     Si lancet  by Finger stick route 2  (two) times daily.    Triamterene-HCTZ 37.5-25 MG Oral Tab 90 tablet 0     Sig: Take 1 tablet by mouth daily.    warfarin 5 MG Oral Tab 90 tablet 1     Sig: Take 1 tablet (5 mg total) by mouth daily.    nystatin 100,000 Units/g External Cream 30 g 1     Sig: Apply 1 Application topically 2 (two) times daily.       Imaging & Consults:  [facility] MIRNA 2D+3D SCREENING BILAT (CPT=77067/46389)  XR DEXA BONE DENSITOMETRY (CPT=77080)

## 2024-01-12 ENCOUNTER — LAB ENCOUNTER (OUTPATIENT)
Dept: LAB | Age: 74
End: 2024-01-12
Attending: INTERNAL MEDICINE
Payer: COMMERCIAL

## 2024-01-12 LAB
INR BLD: 1.84 (ref 0.8–1.2)
INR PPP: 1.84
PROTHROMBIN TIME: 22.4 SECONDS (ref 11.6–14.8)

## 2024-01-12 PROCEDURE — 36415 COLL VENOUS BLD VENIPUNCTURE: CPT

## 2024-01-12 PROCEDURE — 85610 PROTHROMBIN TIME: CPT

## 2024-01-15 ENCOUNTER — ANTI-COAG (OUTPATIENT)
Dept: CARDIOLOGY | Age: 74
End: 2024-01-15

## 2024-01-15 DIAGNOSIS — I74.3 EMBOLISM AND THROMBOSIS OF ARTERIES OF LOWER EXTREMITY (CMD): Primary | ICD-10-CM

## 2024-01-15 DIAGNOSIS — Z79.01 LONG TERM (CURRENT) USE OF ANTICOAGULANTS: ICD-10-CM

## 2024-01-21 DIAGNOSIS — E11.9 DIABETES MELLITUS WITHOUT COMPLICATION (HCC): ICD-10-CM

## 2024-01-22 DIAGNOSIS — M05.742 RHEUMATOID ARTHRITIS INVOLVING BOTH HANDS WITH POSITIVE RHEUMATOID FACTOR (HCC): ICD-10-CM

## 2024-01-22 DIAGNOSIS — M19.071 OSTEOARTHRITIS OF RIGHT ANKLE, UNSPECIFIED OSTEOARTHRITIS TYPE: ICD-10-CM

## 2024-01-22 DIAGNOSIS — M05.741 RHEUMATOID ARTHRITIS INVOLVING BOTH HANDS WITH POSITIVE RHEUMATOID FACTOR (HCC): ICD-10-CM

## 2024-01-22 RX ORDER — METFORMIN HYDROCHLORIDE 500 MG/1
1500 TABLET, EXTENDED RELEASE ORAL
Qty: 270 TABLET | Refills: 0 | Status: SHIPPED | OUTPATIENT
Start: 2024-01-22

## 2024-01-22 RX ORDER — HYDROCODONE BITARTRATE AND ACETAMINOPHEN 5; 325 MG/1; MG/1
1 TABLET ORAL EVERY 6 HOURS PRN
Qty: 120 TABLET | Refills: 0 | Status: SHIPPED | OUTPATIENT
Start: 2024-01-22

## 2024-01-22 NOTE — TELEPHONE ENCOUNTER
Metformin  mg    Diabetic Medication Protocol Zyvyri5201/21/2024 03:33 PM   Protocol Details Last HgBA1C < 7.5      Filled 12-18-23  Qty 90  0 refills  No future appointments.  LOV 1-10-24 SM  Labs 1-7-24 A1c

## 2024-01-30 ENCOUNTER — ANTI-COAG (OUTPATIENT)
Dept: CARDIOLOGY | Age: 74
End: 2024-01-30

## 2024-01-30 DIAGNOSIS — I74.3 EMBOLISM AND THROMBOSIS OF ARTERIES OF LOWER EXTREMITY (CMD): Primary | ICD-10-CM

## 2024-01-30 DIAGNOSIS — Z79.01 LONG TERM (CURRENT) USE OF ANTICOAGULANTS: ICD-10-CM

## 2024-02-06 ENCOUNTER — NURSE ONLY (OUTPATIENT)
Dept: LAB | Age: 74
End: 2024-02-06
Attending: INTERNAL MEDICINE
Payer: COMMERCIAL

## 2024-02-06 ENCOUNTER — ANTI-COAG (OUTPATIENT)
Dept: CARDIOLOGY | Age: 74
End: 2024-02-06

## 2024-02-06 DIAGNOSIS — I74.3 EMBOLISM AND THROMBOSIS OF ARTERIES OF LOWER EXTREMITY (CMD): Primary | ICD-10-CM

## 2024-02-06 DIAGNOSIS — Z79.01 LONG TERM (CURRENT) USE OF ANTICOAGULANTS: ICD-10-CM

## 2024-02-06 DIAGNOSIS — I74.3 EMBOLISM AND THROMBOSIS OF ARTERIES OF LOWER EXTREMITY (HCC): ICD-10-CM

## 2024-02-06 LAB
INR BLDC: 2.8 (ref 0.9–1.1)
INR PPP: 2.8

## 2024-02-06 PROCEDURE — 85610 PROTHROMBIN TIME: CPT

## 2024-02-06 PROCEDURE — 36415 COLL VENOUS BLD VENIPUNCTURE: CPT

## 2024-02-24 DIAGNOSIS — M05.741 RHEUMATOID ARTHRITIS INVOLVING BOTH HANDS WITH POSITIVE RHEUMATOID FACTOR (HCC): ICD-10-CM

## 2024-02-24 DIAGNOSIS — M05.742 RHEUMATOID ARTHRITIS INVOLVING BOTH HANDS WITH POSITIVE RHEUMATOID FACTOR (HCC): ICD-10-CM

## 2024-02-24 DIAGNOSIS — M19.071 OSTEOARTHRITIS OF RIGHT ANKLE, UNSPECIFIED OSTEOARTHRITIS TYPE: ICD-10-CM

## 2024-02-26 RX ORDER — HYDROCODONE BITARTRATE AND ACETAMINOPHEN 5; 325 MG/1; MG/1
1 TABLET ORAL EVERY 6 HOURS PRN
Qty: 120 TABLET | Refills: 0 | Status: SHIPPED | OUTPATIENT
Start: 2024-02-26

## 2024-03-06 ENCOUNTER — ANTI-COAG (OUTPATIENT)
Dept: CARDIOLOGY | Age: 74
End: 2024-03-06

## 2024-03-06 DIAGNOSIS — I74.3 EMBOLISM AND THROMBOSIS OF ARTERIES OF LOWER EXTREMITY (CMD): Primary | ICD-10-CM

## 2024-03-06 DIAGNOSIS — Z79.01 LONG TERM (CURRENT) USE OF ANTICOAGULANTS: ICD-10-CM

## 2024-03-14 ENCOUNTER — ANTI-COAG (OUTPATIENT)
Dept: CARDIOLOGY | Age: 74
End: 2024-03-14

## 2024-03-14 DIAGNOSIS — Z79.01 LONG TERM (CURRENT) USE OF ANTICOAGULANTS: ICD-10-CM

## 2024-03-14 DIAGNOSIS — I74.3 EMBOLISM AND THROMBOSIS OF ARTERIES OF LOWER EXTREMITY (CMD): Primary | ICD-10-CM

## 2024-03-22 ENCOUNTER — ANTI-COAG (OUTPATIENT)
Dept: CARDIOLOGY | Age: 74
End: 2024-03-22

## 2024-03-22 DIAGNOSIS — Z79.01 LONG TERM (CURRENT) USE OF ANTICOAGULANTS: ICD-10-CM

## 2024-03-22 DIAGNOSIS — I74.3 EMBOLISM AND THROMBOSIS OF ARTERIES OF LOWER EXTREMITY (CMD): Primary | ICD-10-CM

## 2024-03-26 DIAGNOSIS — M05.741 RHEUMATOID ARTHRITIS INVOLVING BOTH HANDS WITH POSITIVE RHEUMATOID FACTOR (HCC): ICD-10-CM

## 2024-03-26 DIAGNOSIS — M05.742 RHEUMATOID ARTHRITIS INVOLVING BOTH HANDS WITH POSITIVE RHEUMATOID FACTOR (HCC): ICD-10-CM

## 2024-03-26 DIAGNOSIS — M19.071 OSTEOARTHRITIS OF RIGHT ANKLE, UNSPECIFIED OSTEOARTHRITIS TYPE: ICD-10-CM

## 2024-03-26 RX ORDER — HYDROCODONE BITARTRATE AND ACETAMINOPHEN 5; 325 MG/1; MG/1
1 TABLET ORAL EVERY 6 HOURS PRN
Qty: 120 TABLET | Refills: 0 | Status: SHIPPED | OUTPATIENT
Start: 2024-03-26

## 2024-03-29 ENCOUNTER — TELEPHONE (OUTPATIENT)
Dept: CARDIOLOGY | Age: 74
End: 2024-03-29

## 2024-03-29 ENCOUNTER — ANTI-COAG (OUTPATIENT)
Dept: CARDIOLOGY | Age: 74
End: 2024-03-29

## 2024-03-29 DIAGNOSIS — I74.3 EMBOLISM AND THROMBOSIS OF ARTERIES OF LOWER EXTREMITY (CMD): Primary | ICD-10-CM

## 2024-03-29 DIAGNOSIS — Z79.01 LONG TERM (CURRENT) USE OF ANTICOAGULANTS: ICD-10-CM

## 2024-04-05 ENCOUNTER — ANTI-COAG (OUTPATIENT)
Dept: CARDIOLOGY | Age: 74
End: 2024-04-05

## 2024-04-05 DIAGNOSIS — I74.3 EMBOLISM AND THROMBOSIS OF ARTERIES OF LOWER EXTREMITY (CMD): Primary | ICD-10-CM

## 2024-04-05 DIAGNOSIS — Z79.01 LONG TERM (CURRENT) USE OF ANTICOAGULANTS: ICD-10-CM

## 2024-04-07 DIAGNOSIS — F32.A DEPRESSION, UNSPECIFIED DEPRESSION TYPE: ICD-10-CM

## 2024-04-07 RX ORDER — FLUOXETINE HYDROCHLORIDE 40 MG/1
40 CAPSULE ORAL DAILY
Qty: 90 CAPSULE | Refills: 0 | Status: SHIPPED | OUTPATIENT
Start: 2024-04-07

## 2024-04-07 RX ORDER — DAPAGLIFLOZIN 10 MG/1
10 TABLET, FILM COATED ORAL DAILY
Qty: 90 TABLET | Refills: 0 | Status: SHIPPED | OUTPATIENT
Start: 2024-04-07

## 2024-04-12 ENCOUNTER — ANTI-COAG (OUTPATIENT)
Dept: CARDIOLOGY | Age: 74
End: 2024-04-12

## 2024-04-12 DIAGNOSIS — Z79.01 LONG TERM (CURRENT) USE OF ANTICOAGULANTS: ICD-10-CM

## 2024-04-12 DIAGNOSIS — I74.3 EMBOLISM AND THROMBOSIS OF ARTERIES OF LOWER EXTREMITY (CMD): Primary | ICD-10-CM

## 2024-04-14 DIAGNOSIS — I10 ESSENTIAL HYPERTENSION: ICD-10-CM

## 2024-04-14 DIAGNOSIS — E11.9 DIABETES MELLITUS WITHOUT COMPLICATION (HCC): ICD-10-CM

## 2024-04-14 RX ORDER — METFORMIN HYDROCHLORIDE 500 MG/1
1500 TABLET, EXTENDED RELEASE ORAL
Qty: 270 TABLET | Refills: 0 | Status: SHIPPED | OUTPATIENT
Start: 2024-04-14

## 2024-04-14 RX ORDER — FELODIPINE 10 MG/1
10 TABLET, EXTENDED RELEASE ORAL DAILY
Qty: 90 TABLET | Refills: 0 | Status: SHIPPED | OUTPATIENT
Start: 2024-04-14

## 2024-04-15 DIAGNOSIS — I74.3 EMBOLISM AND THROMBOSIS OF ARTERIES OF LOWER EXTREMITY (CMD): Primary | ICD-10-CM

## 2024-04-15 DIAGNOSIS — Z79.01 LONG TERM (CURRENT) USE OF ANTICOAGULANTS: ICD-10-CM

## 2024-04-16 ENCOUNTER — TELEPHONE (OUTPATIENT)
Dept: CARDIOLOGY | Age: 74
End: 2024-04-16

## 2024-04-19 ENCOUNTER — NURSE ONLY (OUTPATIENT)
Dept: LAB | Age: 74
End: 2024-04-19
Attending: FAMILY MEDICINE
Payer: COMMERCIAL

## 2024-04-19 ENCOUNTER — ANTI-COAG (OUTPATIENT)
Dept: CARDIOLOGY | Age: 74
End: 2024-04-19

## 2024-04-19 ENCOUNTER — TELEPHONE (OUTPATIENT)
Dept: CARDIOLOGY | Age: 74
End: 2024-04-19

## 2024-04-19 DIAGNOSIS — Z79.01 LONG TERM (CURRENT) USE OF ANTICOAGULANTS: Primary | ICD-10-CM

## 2024-04-19 DIAGNOSIS — I74.3 EMBOLISM AND THROMBOSIS OF ARTERIES OF LOWER EXTREMITY (CMD): Primary | ICD-10-CM

## 2024-04-19 DIAGNOSIS — Z79.01 LONG TERM (CURRENT) USE OF ANTICOAGULANTS: ICD-10-CM

## 2024-04-19 LAB
INR BLDC: 3 (ref 0.9–1.1)
INR PPP: 3

## 2024-04-19 PROCEDURE — 36415 COLL VENOUS BLD VENIPUNCTURE: CPT

## 2024-04-19 PROCEDURE — 85610 PROTHROMBIN TIME: CPT

## 2024-04-28 DIAGNOSIS — M19.071 OSTEOARTHRITIS OF RIGHT ANKLE, UNSPECIFIED OSTEOARTHRITIS TYPE: ICD-10-CM

## 2024-04-28 DIAGNOSIS — E78.2 MIXED HYPERLIPIDEMIA: ICD-10-CM

## 2024-04-28 DIAGNOSIS — M05.741 RHEUMATOID ARTHRITIS INVOLVING BOTH HANDS WITH POSITIVE RHEUMATOID FACTOR (HCC): ICD-10-CM

## 2024-04-28 DIAGNOSIS — I10 ESSENTIAL HYPERTENSION: ICD-10-CM

## 2024-04-28 DIAGNOSIS — M05.742 RHEUMATOID ARTHRITIS INVOLVING BOTH HANDS WITH POSITIVE RHEUMATOID FACTOR (HCC): ICD-10-CM

## 2024-04-29 RX ORDER — ATORVASTATIN CALCIUM 20 MG/1
20 TABLET, FILM COATED ORAL DAILY
Qty: 90 TABLET | Refills: 1 | OUTPATIENT
Start: 2024-04-29

## 2024-04-29 RX ORDER — HYDROCODONE BITARTRATE AND ACETAMINOPHEN 5; 325 MG/1; MG/1
1 TABLET ORAL EVERY 6 HOURS PRN
Qty: 120 TABLET | Refills: 0 | Status: SHIPPED | OUTPATIENT
Start: 2024-04-29

## 2024-04-29 RX ORDER — TRIAMTERENE AND HYDROCHLOROTHIAZIDE 37.5; 25 MG/1; MG/1
1 TABLET ORAL DAILY
Qty: 90 TABLET | Refills: 0 | Status: SHIPPED | OUTPATIENT
Start: 2024-04-29

## 2024-04-29 NOTE — TELEPHONE ENCOUNTER
Atorvastatin 20 mg- too early for refill  Filled 1-10-24  Qty 90  1 refill  No future appointments.  LOV 1-10-24 SM    Norco 5-325 mg  Filled 3-26-24  Qty 120  0 refills  No future appointments.  LOV 1-10-24 SM    Triamterene-hydrochlorothiazide 37.5-25 mg  Filled 1-10-24  Qty 90  0 refills  No future appointments.  LOV 1-10-24 SM

## 2024-05-13 ENCOUNTER — ANTI-COAG (OUTPATIENT)
Dept: CARDIOLOGY | Age: 74
End: 2024-05-13

## 2024-05-13 DIAGNOSIS — Z79.01 LONG TERM (CURRENT) USE OF ANTICOAGULANTS: ICD-10-CM

## 2024-05-13 DIAGNOSIS — I74.3 EMBOLISM AND THROMBOSIS OF ARTERIES OF LOWER EXTREMITY  (CMD): Primary | ICD-10-CM

## 2024-05-20 ENCOUNTER — ANTI-COAG (OUTPATIENT)
Dept: CARDIOLOGY | Age: 74
End: 2024-05-20

## 2024-05-20 DIAGNOSIS — I74.3 EMBOLISM AND THROMBOSIS OF ARTERIES OF LOWER EXTREMITY  (CMD): Primary | ICD-10-CM

## 2024-05-20 DIAGNOSIS — Z79.01 LONG TERM (CURRENT) USE OF ANTICOAGULANTS: ICD-10-CM

## 2024-05-28 ENCOUNTER — ANTI-COAG (OUTPATIENT)
Dept: CARDIOLOGY | Age: 74
End: 2024-05-28

## 2024-05-28 DIAGNOSIS — Z79.01 LONG TERM (CURRENT) USE OF ANTICOAGULANTS: ICD-10-CM

## 2024-05-28 DIAGNOSIS — I74.3 EMBOLISM AND THROMBOSIS OF ARTERIES OF LOWER EXTREMITY  (CMD): Primary | ICD-10-CM

## 2024-05-29 DIAGNOSIS — M05.742 RHEUMATOID ARTHRITIS INVOLVING BOTH HANDS WITH POSITIVE RHEUMATOID FACTOR (HCC): ICD-10-CM

## 2024-05-29 DIAGNOSIS — M05.741 RHEUMATOID ARTHRITIS INVOLVING BOTH HANDS WITH POSITIVE RHEUMATOID FACTOR (HCC): ICD-10-CM

## 2024-05-29 DIAGNOSIS — M19.071 OSTEOARTHRITIS OF RIGHT ANKLE, UNSPECIFIED OSTEOARTHRITIS TYPE: ICD-10-CM

## 2024-05-29 RX ORDER — HYDROCODONE BITARTRATE AND ACETAMINOPHEN 5; 325 MG/1; MG/1
1 TABLET ORAL EVERY 6 HOURS PRN
Qty: 120 TABLET | Refills: 0 | Status: SHIPPED | OUTPATIENT
Start: 2024-05-29

## 2024-06-05 ENCOUNTER — ANTI-COAG (OUTPATIENT)
Dept: CARDIOLOGY | Age: 74
End: 2024-06-05

## 2024-06-05 ENCOUNTER — TELEPHONE (OUTPATIENT)
Dept: CARDIOLOGY | Age: 74
End: 2024-06-05

## 2024-06-05 DIAGNOSIS — Z79.01 LONG TERM (CURRENT) USE OF ANTICOAGULANTS: ICD-10-CM

## 2024-06-05 DIAGNOSIS — I74.3 EMBOLISM AND THROMBOSIS OF ARTERIES OF LOWER EXTREMITY  (CMD): Primary | ICD-10-CM

## 2024-06-12 ENCOUNTER — ANTI-COAG (OUTPATIENT)
Dept: CARDIOLOGY | Age: 74
End: 2024-06-12

## 2024-06-12 DIAGNOSIS — Z79.01 LONG TERM (CURRENT) USE OF ANTICOAGULANTS: ICD-10-CM

## 2024-06-12 DIAGNOSIS — I74.3 EMBOLISM AND THROMBOSIS OF ARTERIES OF LOWER EXTREMITY  (CMD): Primary | ICD-10-CM

## 2024-06-14 ENCOUNTER — ANTI-COAG (OUTPATIENT)
Dept: CARDIOLOGY | Age: 74
End: 2024-06-14

## 2024-06-14 DIAGNOSIS — I74.3 EMBOLISM AND THROMBOSIS OF ARTERIES OF LOWER EXTREMITY  (CMD): Primary | ICD-10-CM

## 2024-06-14 DIAGNOSIS — Z79.01 LONG TERM (CURRENT) USE OF ANTICOAGULANTS: ICD-10-CM

## 2024-06-17 ENCOUNTER — NURSE ONLY (OUTPATIENT)
Dept: LAB | Age: 74
End: 2024-06-17
Attending: INTERNAL MEDICINE
Payer: COMMERCIAL

## 2024-06-17 ENCOUNTER — TELEPHONE (OUTPATIENT)
Dept: CARDIOLOGY | Age: 74
End: 2024-06-17

## 2024-06-17 ENCOUNTER — ANTI-COAG (OUTPATIENT)
Dept: CARDIOLOGY | Age: 74
End: 2024-06-17

## 2024-06-17 DIAGNOSIS — I74.3 EMBOLISM AND THROMBOSIS OF ARTERIES OF LOWER EXTREMITY  (CMD): Primary | ICD-10-CM

## 2024-06-17 DIAGNOSIS — Z79.01 LONG TERM (CURRENT) USE OF ANTICOAGULANTS: ICD-10-CM

## 2024-06-17 LAB
INR BLDC: 2.5 (ref 0.9–1.1)
INR PPP: 2.5

## 2024-06-17 PROCEDURE — 85610 PROTHROMBIN TIME: CPT

## 2024-06-17 PROCEDURE — 36415 COLL VENOUS BLD VENIPUNCTURE: CPT

## 2024-06-17 RX ORDER — WARFARIN SODIUM 5 MG/1
TABLET ORAL
Qty: 30 TABLET | Refills: 3 | Status: SHIPPED | OUTPATIENT
Start: 2024-06-17

## 2024-06-30 DIAGNOSIS — I10 HTN (HYPERTENSION), BENIGN: ICD-10-CM

## 2024-06-30 DIAGNOSIS — E11.9 DIABETES MELLITUS WITHOUT COMPLICATION (HCC): Primary | ICD-10-CM

## 2024-06-30 DIAGNOSIS — M05.741 RHEUMATOID ARTHRITIS INVOLVING BOTH HANDS WITH POSITIVE RHEUMATOID FACTOR (HCC): ICD-10-CM

## 2024-06-30 DIAGNOSIS — I10 ESSENTIAL HYPERTENSION: ICD-10-CM

## 2024-06-30 DIAGNOSIS — M19.071 OSTEOARTHRITIS OF RIGHT ANKLE, UNSPECIFIED OSTEOARTHRITIS TYPE: ICD-10-CM

## 2024-06-30 DIAGNOSIS — E78.5 HYPERLIPIDEMIA, UNSPECIFIED HYPERLIPIDEMIA TYPE: ICD-10-CM

## 2024-06-30 DIAGNOSIS — M05.742 RHEUMATOID ARTHRITIS INVOLVING BOTH HANDS WITH POSITIVE RHEUMATOID FACTOR (HCC): ICD-10-CM

## 2024-07-01 RX ORDER — DAPAGLIFLOZIN 10 MG/1
10 TABLET, FILM COATED ORAL DAILY
Qty: 90 TABLET | Refills: 0 | Status: SHIPPED | OUTPATIENT
Start: 2024-07-01

## 2024-07-01 RX ORDER — HYDROCODONE BITARTRATE AND ACETAMINOPHEN 5; 325 MG/1; MG/1
1 TABLET ORAL EVERY 6 HOURS PRN
Qty: 120 TABLET | Refills: 0 | Status: SHIPPED | OUTPATIENT
Start: 2024-07-01

## 2024-07-01 NOTE — TELEPHONE ENCOUNTER
Farxiga 10 mg  Filled 4-7-24  Qty 90  0 refills  Future Appointments   Date Time Provider Department Center   7/24/2024 11:00 AM Fernanda Almonte APRN [facility] 8 [facility] [place]   LOV 1-10-24 SM  Labs 1-7-24 A1c    Norco 5-325 mg  Filled 5-29-24  Qty 120  0 refills  Future Appointments   Date Time Provider Department Center   7/24/2024 11:00 AM Fernanda Almonte APRN [facility] 8 [facility] [place]   LOV 1-10-24 SM

## 2024-07-10 DIAGNOSIS — I10 ESSENTIAL HYPERTENSION: ICD-10-CM

## 2024-07-10 DIAGNOSIS — E11.9 DIABETES MELLITUS WITHOUT COMPLICATION (HCC): ICD-10-CM

## 2024-07-10 RX ORDER — FELODIPINE 10 MG/1
10 TABLET, EXTENDED RELEASE ORAL DAILY
Qty: 90 TABLET | Refills: 0 | Status: SHIPPED | OUTPATIENT
Start: 2024-07-10

## 2024-07-10 RX ORDER — METFORMIN HYDROCHLORIDE 500 MG/1
1500 TABLET, EXTENDED RELEASE ORAL
Qty: 270 TABLET | Refills: 0 | Status: SHIPPED | OUTPATIENT
Start: 2024-07-10

## 2024-07-10 NOTE — TELEPHONE ENCOUNTER
Metformin  mg  Diabetes Medication Protocol Bgoqyi48/10/2024 11:31 AM   Protocol Details Last A1C < 7.5 and within past 6 months    Microalbumin procedure in past 12 months or taking ACE/ARB   Filled 4-14-24  Qty 270  0 refills  Future Appointments   Date Time Provider Department Center   7/24/2024 11:00 AM Fernanda Almonte APRN [facility] 8 [facility] [place]   LOV 1-10-24 SM    Felodipine ER 10 mg  Hypertension Medications Protocol Rubopt02/10/2024 11:31 AM   Protocol Details CMP or BMP in past 12 months   Filled 4-14-24  Qty 90  0 refills  Future Appointments   Date Time Provider Department Center   7/24/2024 11:00 AM Fernanda Almonte APRN [facility] 8 [facility] [place]   LOV 1-10-24 SM

## 2024-07-14 DIAGNOSIS — F32.A DEPRESSION, UNSPECIFIED DEPRESSION TYPE: ICD-10-CM

## 2024-07-14 RX ORDER — FLUOXETINE HYDROCHLORIDE 40 MG/1
40 CAPSULE ORAL DAILY
Qty: 90 CAPSULE | Refills: 0 | Status: SHIPPED | OUTPATIENT
Start: 2024-07-14

## 2024-07-16 ENCOUNTER — ANTI-COAG (OUTPATIENT)
Dept: CARDIOLOGY | Age: 74
End: 2024-07-16

## 2024-07-16 DIAGNOSIS — I74.3 EMBOLISM AND THROMBOSIS OF ARTERIES OF LOWER EXTREMITY  (CMD): Primary | ICD-10-CM

## 2024-07-16 DIAGNOSIS — Z79.01 LONG TERM (CURRENT) USE OF ANTICOAGULANTS: ICD-10-CM

## 2024-07-23 ENCOUNTER — ANTI-COAG (OUTPATIENT)
Dept: CARDIOLOGY | Age: 74
End: 2024-07-23

## 2024-07-23 ENCOUNTER — NURSE ONLY (OUTPATIENT)
Dept: LAB | Age: 74
End: 2024-07-23
Attending: FAMILY MEDICINE
Payer: COMMERCIAL

## 2024-07-23 ENCOUNTER — LAB ENCOUNTER (OUTPATIENT)
Dept: LAB | Age: 74
End: 2024-07-23
Attending: FAMILY MEDICINE
Payer: COMMERCIAL

## 2024-07-23 ENCOUNTER — TELEPHONE (OUTPATIENT)
Dept: CARDIOLOGY | Age: 74
End: 2024-07-23

## 2024-07-23 DIAGNOSIS — E55.9 VITAMIN D DEFICIENCY: ICD-10-CM

## 2024-07-23 DIAGNOSIS — E11.9 DIABETES MELLITUS WITHOUT COMPLICATION (HCC): ICD-10-CM

## 2024-07-23 DIAGNOSIS — Z00.00 ROUTINE GENERAL MEDICAL EXAMINATION AT A HEALTH CARE FACILITY: ICD-10-CM

## 2024-07-23 DIAGNOSIS — I10 ESSENTIAL HYPERTENSION: ICD-10-CM

## 2024-07-23 DIAGNOSIS — E78.5 HYPERLIPIDEMIA, UNSPECIFIED HYPERLIPIDEMIA TYPE: ICD-10-CM

## 2024-07-23 DIAGNOSIS — Z79.01 LONG TERM (CURRENT) USE OF ANTICOAGULANTS: ICD-10-CM

## 2024-07-23 DIAGNOSIS — I74.3 EMBOLISM AND THROMBOSIS OF ARTERIES OF LOWER EXTREMITY  (CMD): Primary | ICD-10-CM

## 2024-07-23 LAB
ALBUMIN SERPL-MCNC: 4.4 G/DL (ref 3.2–4.8)
ALBUMIN/GLOB SERPL: 1.4 {RATIO} (ref 1–2)
ALP LIVER SERPL-CCNC: 82 U/L
ALT SERPL-CCNC: 29 U/L
ANION GAP SERPL CALC-SCNC: 4 MMOL/L (ref 0–18)
AST SERPL-CCNC: 21 U/L (ref ?–34)
BASOPHILS # BLD AUTO: 0.06 X10(3) UL (ref 0–0.2)
BASOPHILS NFR BLD AUTO: 0.9 %
BILIRUB SERPL-MCNC: 1.3 MG/DL (ref 0.2–1.1)
BUN BLD-MCNC: 12 MG/DL (ref 9–23)
BUN/CREAT SERPL: 14 (ref 10–20)
CALCIUM BLD-MCNC: 9.9 MG/DL (ref 8.7–10.4)
CHLORIDE SERPL-SCNC: 103 MMOL/L (ref 98–112)
CHOLEST SERPL-MCNC: 152 MG/DL (ref ?–200)
CO2 SERPL-SCNC: 31 MMOL/L (ref 21–32)
CREAT BLD-MCNC: 0.86 MG/DL
DEPRECATED RDW RBC AUTO: 46.4 FL (ref 35.1–46.3)
EGFRCR SERPLBLD CKD-EPI 2021: 72 ML/MIN/1.73M2 (ref 60–?)
EOSINOPHIL # BLD AUTO: 0.3 X10(3) UL (ref 0–0.7)
EOSINOPHIL NFR BLD AUTO: 4.3 %
ERYTHROCYTE [DISTWIDTH] IN BLOOD BY AUTOMATED COUNT: 14 % (ref 11–15)
EST. AVERAGE GLUCOSE BLD GHB EST-MCNC: 186 MG/DL (ref 68–126)
FASTING PATIENT LIPID ANSWER: YES
FASTING STATUS PATIENT QL REPORTED: YES
GLOBULIN PLAS-MCNC: 3.1 G/DL (ref 2–3.5)
GLUCOSE BLD-MCNC: 207 MG/DL (ref 70–99)
HBA1C MFR BLD: 8.1 % (ref ?–5.7)
HCT VFR BLD AUTO: 47.3 %
HDLC SERPL-MCNC: 50 MG/DL (ref 40–59)
HGB BLD-MCNC: 15.5 G/DL
IMM GRANULOCYTES # BLD AUTO: 0.02 X10(3) UL (ref 0–1)
IMM GRANULOCYTES NFR BLD: 0.3 %
INR BLDC: 2.8 (ref 0.9–1.1)
INR PPP: 2.8
LDLC SERPL CALC-MCNC: 75 MG/DL (ref ?–100)
LYMPHOCYTES # BLD AUTO: 0.88 X10(3) UL (ref 1–4)
LYMPHOCYTES NFR BLD AUTO: 12.6 %
MCH RBC QN AUTO: 29.4 PG (ref 26–34)
MCHC RBC AUTO-ENTMCNC: 32.8 G/DL (ref 31–37)
MCV RBC AUTO: 89.6 FL
MONOCYTES # BLD AUTO: 0.55 X10(3) UL (ref 0.1–1)
MONOCYTES NFR BLD AUTO: 7.9 %
NEUTROPHILS # BLD AUTO: 5.18 X10 (3) UL (ref 1.5–7.7)
NEUTROPHILS # BLD AUTO: 5.18 X10(3) UL (ref 1.5–7.7)
NEUTROPHILS NFR BLD AUTO: 74 %
NONHDLC SERPL-MCNC: 102 MG/DL (ref ?–130)
OSMOLALITY SERPL CALC.SUM OF ELEC: 292 MOSM/KG (ref 275–295)
PLATELET # BLD AUTO: 218 10(3)UL (ref 150–450)
POTASSIUM SERPL-SCNC: 4 MMOL/L (ref 3.5–5.1)
PROT SERPL-MCNC: 7.5 G/DL (ref 5.7–8.2)
RBC # BLD AUTO: 5.28 X10(6)UL
SODIUM SERPL-SCNC: 138 MMOL/L (ref 136–145)
TRIGL SERPL-MCNC: 155 MG/DL (ref 30–149)
TSI SER-ACNC: 1.7 MIU/ML (ref 0.55–4.78)
VIT D+METAB SERPL-MCNC: 39.6 NG/ML (ref 30–100)
VLDLC SERPL CALC-MCNC: 24 MG/DL (ref 0–30)
WBC # BLD AUTO: 7 X10(3) UL (ref 4–11)

## 2024-07-23 PROCEDURE — 36415 COLL VENOUS BLD VENIPUNCTURE: CPT

## 2024-07-23 PROCEDURE — 80050 GENERAL HEALTH PANEL: CPT | Performed by: FAMILY MEDICINE

## 2024-07-23 PROCEDURE — 82306 VITAMIN D 25 HYDROXY: CPT | Performed by: FAMILY MEDICINE

## 2024-07-23 PROCEDURE — 85610 PROTHROMBIN TIME: CPT

## 2024-07-23 PROCEDURE — 80061 LIPID PANEL: CPT | Performed by: FAMILY MEDICINE

## 2024-07-23 PROCEDURE — 83036 HEMOGLOBIN GLYCOSYLATED A1C: CPT | Performed by: FAMILY MEDICINE

## 2024-07-24 ENCOUNTER — OFFICE VISIT (OUTPATIENT)
Dept: INTERNAL MEDICINE CLINIC | Facility: CLINIC | Age: 74
End: 2024-07-24
Payer: COMMERCIAL

## 2024-07-24 VITALS
TEMPERATURE: 97 F | SYSTOLIC BLOOD PRESSURE: 138 MMHG | HEART RATE: 92 BPM | BODY MASS INDEX: 49.91 KG/M2 | HEIGHT: 61 IN | WEIGHT: 264.38 LBS | RESPIRATION RATE: 22 BRPM | DIASTOLIC BLOOD PRESSURE: 64 MMHG | OXYGEN SATURATION: 95 %

## 2024-07-24 DIAGNOSIS — M05.741 RHEUMATOID ARTHRITIS INVOLVING BOTH HANDS WITH POSITIVE RHEUMATOID FACTOR (HCC): ICD-10-CM

## 2024-07-24 DIAGNOSIS — E78.2 MIXED HYPERLIPIDEMIA: ICD-10-CM

## 2024-07-24 DIAGNOSIS — R17 SERUM TOTAL BILIRUBIN ELEVATED: ICD-10-CM

## 2024-07-24 DIAGNOSIS — N39.3 STRESS INCONTINENCE, FEMALE: ICD-10-CM

## 2024-07-24 DIAGNOSIS — F33.0 MILD EPISODE OF RECURRENT MAJOR DEPRESSIVE DISORDER (HCC): ICD-10-CM

## 2024-07-24 DIAGNOSIS — M19.071 OSTEOARTHRITIS OF RIGHT ANKLE, UNSPECIFIED OSTEOARTHRITIS TYPE: ICD-10-CM

## 2024-07-24 DIAGNOSIS — E11.65 TYPE 2 DIABETES MELLITUS WITH HYPERGLYCEMIA, WITHOUT LONG-TERM CURRENT USE OF INSULIN (HCC): Primary | ICD-10-CM

## 2024-07-24 DIAGNOSIS — M05.742 RHEUMATOID ARTHRITIS INVOLVING BOTH HANDS WITH POSITIVE RHEUMATOID FACTOR (HCC): ICD-10-CM

## 2024-07-24 DIAGNOSIS — I10 ESSENTIAL HYPERTENSION: ICD-10-CM

## 2024-07-24 LAB
CREAT UR-SCNC: 38.9 MG/DL
MICROALBUMIN UR-MCNC: 3.2 MG/DL
MICROALBUMIN/CREAT 24H UR-RTO: 82.3 UG/MG (ref ?–30)

## 2024-07-24 PROCEDURE — 3052F HG A1C>EQUAL 8.0%<EQUAL 9.0%: CPT | Performed by: FAMILY MEDICINE

## 2024-07-24 PROCEDURE — 3078F DIAST BP <80 MM HG: CPT | Performed by: FAMILY MEDICINE

## 2024-07-24 PROCEDURE — 3008F BODY MASS INDEX DOCD: CPT | Performed by: FAMILY MEDICINE

## 2024-07-24 PROCEDURE — 82570 ASSAY OF URINE CREATININE: CPT | Performed by: FAMILY MEDICINE

## 2024-07-24 PROCEDURE — 82043 UR ALBUMIN QUANTITATIVE: CPT | Performed by: FAMILY MEDICINE

## 2024-07-24 PROCEDURE — 99214 OFFICE O/P EST MOD 30 MIN: CPT | Performed by: FAMILY MEDICINE

## 2024-07-24 PROCEDURE — 3075F SYST BP GE 130 - 139MM HG: CPT | Performed by: FAMILY MEDICINE

## 2024-07-24 RX ORDER — ATORVASTATIN CALCIUM 20 MG/1
20 TABLET, FILM COATED ORAL DAILY
Qty: 90 TABLET | Refills: 1 | Status: SHIPPED | OUTPATIENT
Start: 2024-07-24

## 2024-07-24 RX ORDER — TRIAMTERENE AND HYDROCHLOROTHIAZIDE 37.5; 25 MG/1; MG/1
1 TABLET ORAL DAILY
Qty: 90 TABLET | Refills: 1 | Status: SHIPPED | OUTPATIENT
Start: 2024-07-24

## 2024-07-24 RX ORDER — SOLIFENACIN SUCCINATE 5 MG/1
5 TABLET, FILM COATED ORAL DAILY
Qty: 90 TABLET | Refills: 0 | Status: SHIPPED | OUTPATIENT
Start: 2024-07-24

## 2024-07-24 RX ORDER — SOLIFENACIN SUCCINATE 5 MG/1
5 TABLET, FILM COATED ORAL DAILY
Qty: 30 TABLET | Refills: 1 | Status: SHIPPED | OUTPATIENT
Start: 2024-07-24 | End: 2024-07-24

## 2024-07-24 NOTE — PROGRESS NOTES
HPI:   Amee Bermudez is a 74 year old female who presents for recheck of her diabetes. Pt's weight down another 3 pounds since her last visit.  Patient’s FBS have been 110-140'.  Pt tests her blood a few times per week  Last visit with ophthalmologist was 1/10/24.    Pt has been checking her feet on a regular basis. Pt denies any tingling of the feet.     Patient presents for recheck of their hypertension. Pt has been taking medications as instructed, no medication side effects, home BP monitoring has not been done  Currently asymptomatic, no chest pains, jaw pains, arm pains. No headaches, dizziness or edema.  No SOB on exertion or rest.  Pt has been following a low fat diet and has not been exercising much. Pt does try to walk some every day but since group home has become a couch potato.    Pt is here for a recheck of depression. Has been tolerating the meds well. Denies any side effects from the meds. Mood has been good. Patient's appetite is good.Pt denies headaches,tics,or insomnia. Pt has felt a little anxious lately but now her  is retired and he is driving her a little nuts. But nothing she can not handle.No suicidal ideations. Would like to continue the same medication.     The patient is here to recheck her RA/OA. Pt has pain in  the following joints: hands,bilateral knees /ankles and back. Pt has had these symptoms for years and has seen Rheumatology in the past. Pt has been using the following medication: Norco 1-3 per day. Pain issues controlled by Norco since she cannot take nsaids because of warfarin use.  Pt describes her sx's as good controlled. Symptoms are worsened by increase walking. Pt has become more sedentary since she retired and knows she has to get moving more.    Patient has been experiencing a little more stress incontinence and would like to try medication for it. Pt has only one kidney so wants to make sure it is ok to take the medication. Discussed with Dr. Ramirez and he  stated it was fine since her kidney functions look great.    BP Readings from Last 3 Encounters:   07/24/24 138/64   01/10/24 136/82   09/16/23 144/64         Wt Readings from Last 6 Encounters:   07/24/24 264 lb 6.4 oz (119.9 kg)   01/10/24 267 lb 9.6 oz (121.4 kg)   06/14/23 279 lb (126.6 kg)   01/04/23 272 lb 12.8 oz (123.7 kg)   07/12/22 271 lb (122.9 kg)   12/29/21 263 lb 12.8 oz (119.7 kg)     Body mass index is 49.96 kg/m².     HEMOGLOBIN A1C (%)   Date Value   03/11/2020 6.1 (A)     HEMOGLOBIN A1c (% of total Hgb)   Date Value   03/02/2017 6.6 (H)   08/11/2015 6.3 (H)   08/20/2014 5.8 (H)     HgbA1C (%)   Date Value   07/23/2024 8.1 (H)   01/07/2024 8.0 (H)   10/22/2023 8.6 (H)     Cholesterol, Total (mg/dL)   Date Value   07/23/2024 152   01/07/2024 145   06/13/2023 122     CHOLESTEROL, TOTAL (mg/dL)   Date Value   03/02/2017 166   02/04/2016 170   08/11/2015 165     HDL Cholesterol (mg/dL)   Date Value   07/23/2024 50   01/07/2024 55   06/13/2023 56     HDL CHOLESTEROL (mg/dL)   Date Value   03/02/2017 52   02/04/2016 51   08/11/2015 55     LDL Cholesterol (mg/dL)   Date Value   07/23/2024 75   01/07/2024 64   06/13/2023 42     LDL-CHOLESTEROL (mg/dL (calc))   Date Value   03/02/2017 89   02/04/2016 84   08/11/2015 71     AST (U/L)   Date Value   07/23/2024 21   06/13/2023 29   03/26/2023 29   03/02/2017 23   02/04/2016 22   08/11/2015 18     ALT (U/L)   Date Value   07/23/2024 29   06/13/2023 48   03/26/2023 47   03/02/2017 32 (H)   02/04/2016 29   08/11/2015 23      Malb/Cre Calc   Date Value Ref Range Status   02/14/2023 31.2 (H) <=30.0 ug/mg Final     Comment:     <30 ug/mg creatinine       Normal     ug/mg creatinine   Microalbuminuria   >300 ug/mg creatinine      Albuminuria       12/29/2021 20.7 <=30.0 ug/mg Final     Comment:     <30 ug/mg creatinine       Normal     ug/mg creatinine   Microalbuminuria   >300 ug/mg creatinine      Albuminuria       06/25/2020 13.1 <=30.0 ug/mg Final      Comment:     <30 ug/mg creatinine       Normal     ug/mg creatinine   Microalbuminuria   >300 ug/mg creatinine      Albuminuria           Current Outpatient Medications   Medication Sig Dispense Refill    atorvastatin 20 MG Oral Tab Take 1 tablet (20 mg total) by mouth daily. 90 tablet 1    Triamterene-HCTZ 37.5-25 MG Oral Tab Take 1 tablet by mouth daily. 90 tablet 1    Solifenacin Succinate (VESICARE) 5 MG Oral Tab Take 1 tablet (5 mg total) by mouth daily. 30 tablet 1    FLUoxetine HCl 40 MG Oral Cap Take 1 capsule (40 mg total) by mouth daily. 90 capsule 0    felodipine ER 10 MG Oral Tablet 24 Hr Take 1 tablet (10 mg total) by mouth daily. 90 tablet 0    metFORMIN  MG Oral Tablet 24 Hr Take 3 tablets (1,500 mg total) by mouth daily with breakfast. 270 tablet 0    dapagliflozin (FARXIGA) 10 MG Oral Tab Take 1 tablet (10 mg total) by mouth daily. 90 tablet 0    HYDROcodone-acetaminophen 5-325 MG Oral Tab Take 1 tablet by mouth every 6 (six) hours as needed. LABS  tablet 0    Glucose Blood (CONTOUR NEXT TEST) In Vitro Strip Patient tests her glucose twice a day 200 strip 1    Microlet Lancets Does not apply Misc 1 lancet  by Finger stick route 2 (two) times daily. 200 each 1    warfarin 5 MG Oral Tab Take 1 tablet (5 mg total) by mouth daily. 90 tablet 1    nystatin 100,000 Units/g External Cream Apply 1 Application topically 2 (two) times daily. 30 g 1    Nystatin 060241 UNIT/GM External Powder Apply 1 Application topically 4 (four) times daily. (Patient taking differently: Apply 1 Application  topically 4 (four) times daily as needed.) 60 g 1      Allergies   Allergen Reactions    Cat Hair Extract Coughing, HIVES and ITCHING    Seasonal       Past Medical History:    Allergic rhinitis    Anxiety    Arthritis    Depression    Diabetes (HCC)    Hyperlipidemia    Knee osteoarthritis    Obesity    Osteoarthritis    Sleep apnea    Solitary kidney, congenital    Unspecified essential hypertension       Past Surgical History:   Procedure Laterality Date    Cataract  5yrs ago    Cholecystectomy        89    Other surgical history  ,     left femoral angiogram/angioplasty and stent    Other surgical history  age 29    right ankle fx (slipped on ice)    Tonsillectomy  67 yrs ago      Social History:   Social History     Socioeconomic History    Marital status:    Tobacco Use    Smoking status: Former     Current packs/day: 0.00     Types: Cigarettes     Quit date: 1981     Years since quittin.5    Smokeless tobacco: Never   Vaping Use    Vaping status: Never Used   Substance and Sexual Activity    Alcohol use: Yes     Comment: One in a while, 1 glass of wine    Drug use: Never   Other Topics Concern    Caffeine Concern No    Stress Concern No    Weight Concern No    Special Diet No    Exercise No    Seat Belt Yes     Social Determinants of Health     Physical Activity: Insufficiently Active (2023)    Received from [employer], [facility] [facility] [employer]    Exercise Vital Sign     On average, how many days per week do you engage in moderate to strenuous exercise (like a brisk walk)?: 7 days     On average, how many minutes do you engage in exercise at this level?: 10 min     Exercise: minimal, walking.  Diet: watches fats closely and watches sugar closely     REVIEW OF SYSTEMS:   Constitutional: Negative for fatigue and unexpected weight change.   HENT: Negative for dental problem.    Eyes: Negative for visual disturbance.   Respiratory: Negative for cough and shortness of breath.    Cardiovascular: Negative for chest pain, palpitations  Gastrointestinal: Negative for nausea, diarrhea and constipation.   Endocrine: Negative for polydipsia, polyphagia and polyuria.   Genitourinary: Negative for dysuria. See HPI  Musculoskeletal: see HPI   Skin: Negative for rashes  Neurological: Negative for numbness.   Hematological:Negative for hypoglycemia    Psychiatric/Behavioral: Negative for sleep disturbance.    EXAM:   /64   Pulse 92   Temp 97 °F (36.1 °C) (Temporal)   Resp 22   Ht 5' 1\" (1.549 m)   Wt 264 lb 6.4 oz (119.9 kg)   SpO2 95%   BMI 49.96 kg/m²   GENERAL: well developed, well nourished,in no apparent distress, pleasant, obese weight.  SKIN: no rashes,no suspicious lesions  HEENT: NC/AT, PERRLA, EOMs intact b/l. Ears clear b/l, throat without erythema or exudate.   NECK: supple,no adenopathy,no bruits.  No thyromegaly.   LUNGS: clear to auscultation, no W/R/R.  CARDIO: RRR without murmur  GI: BS x4, normoactive, no masses or HSM.  Soft without distention or tenderness  EXTREMITIES: no cyanosis, clubbing, mild ankle edema; Shoes and socks were removed-- Bilateral barefoot skin diabetic exam is normal, visualized feet and the appearance is normal.  Bilateral monofilament/sensation of both feet is normal.  Pulsation pedal pulse exam of both lower legs/feet is normal as well.  NEURO: CN II-XII intact. DTRs 2/4 B/L UE.    MUSC: joints unremarkable, no obvious deformity,+ tenderness in the ankles, hands and back, no joint swelling.FROM. Muscle tone and strength normal and symmetrical. Motor exam grossly normal, sensation grossly normal. Gait steady but uses a cane.  PSYCH:  Speech, mood and affect are appropriate      ASSESSMENT AND PLAN:   Amee Bermudez is a 74 year old female who presents for a recheck of:    Encounter Diagnoses   Name Primary?    Mixed hyperlipidemia     Essential hypertension     Type 2 diabetes mellitus with hyperglycemia, without long-term current use of insulin (HCC) Yes    Stress incontinence, female     Mild episode of recurrent major depressive disorder (HCC)     Rheumatoid arthritis involving both hands with positive rheumatoid factor (HCC)     Osteoarthritis of right ankle, unspecified osteoarthritis type     Serum total bilirubin elevated        Orders Placed This Encounter   Procedures    Bilirubin, Direct [E]     Bilirubin, Total [E]       Meds & Refills for this Visit:  Requested Prescriptions     Signed Prescriptions Disp Refills    atorvastatin 20 MG Oral Tab 90 tablet 1     Sig: Take 1 tablet (20 mg total) by mouth daily.    Triamterene-HCTZ 37.5-25 MG Oral Tab 90 tablet 1     Sig: Take 1 tablet by mouth daily.    Solifenacin Succinate (VESICARE) 5 MG Oral Tab 30 tablet 1     Sig: Take 1 tablet (5 mg total) by mouth daily.       Imaging & Consults:  OP REFERRAL PROVIDER VISIT-DIABETES      1. Mixed hyperlipidemia  Pt to continue their medication, increase exercise,  choose better fats,  increase fiber and avoid processed foods.  - atorvastatin 20 MG Oral Tab; Take 1 tablet (20 mg total) by mouth daily.  Dispense: 90 tablet; Refill: 1    2. Essential hypertension  Conservative measures dicussed. Continue medication.  Diet and exercise explained and encouraged.  Home blood pressure monitoring. Pt should measure BP’s two to three times per week. Goal blood pressure at home - < 135/85.     - Triamterene-HCTZ 37.5-25 MG Oral Tab; Take 1 tablet by mouth daily.  Dispense: 90 tablet; Refill: 1    3. Type 2 diabetes mellitus with hyperglycemia, without long-term current use of insulin (HCC)  Continue present medications. Encouraged to Check sugars daily or as directed.  Eat a low fat, low carb, low cholesterol diet. Call with sugars less than 70 or greater than 300.   Fasting labs are up to date  Please see your specialists as recommended.  Keep up with regular eye exams and Check feet daily. Stay on your low salt, diabetic diet.  Stay as active as you can.  .   - [facility] Provider  - Microalb/Creat Ratio, Random Urine    4. Stress incontinence, female  - Continue Kegel exercises, loosing weight, and diet changes   (avoiding alcohol, caffeinated drinks).  - If not improving can refer to Urology or to PT for pelvic floor exercises  - Medication: see below. Informed Pt it could take 4-12 weeks to see an  improvement on this medication.  - Solifenacin Succinate (VESICARE) 5 MG Oral Tab; Take 1 tablet (5 mg total) by mouth daily.  Dispense: 30 tablet; Refill: 1    5. Mild episode of recurrent major depressive disorder (HCC)  -stable, continue medication    6. Rheumatoid arthritis involving both hands with positive rheumatoid factor (HCC)  - stable continue medication    7. Osteoarthritis of right ankle, unspecified osteoarthritis type  -stable continue medication.    8. Elevated bilirubin   -  recheck labs in one week.     The patient indicates understanding of these issues and agrees to the plan.  The patient is asked to return in 6 months sooner if needed..      I spent 30 minutes preparing to see the patient,reviewing patient's chart,results,history,medical decision making,placing orders,counseling/coordinating care and documenting in the chart.

## 2024-08-01 ENCOUNTER — TELEPHONE (OUTPATIENT)
Dept: CARDIOLOGY | Age: 74
End: 2024-08-01

## 2024-08-06 ENCOUNTER — APPOINTMENT (OUTPATIENT)
Dept: CARDIOLOGY | Age: 74
End: 2024-08-06

## 2024-08-06 DIAGNOSIS — M19.071 OSTEOARTHRITIS OF RIGHT ANKLE, UNSPECIFIED OSTEOARTHRITIS TYPE: ICD-10-CM

## 2024-08-06 DIAGNOSIS — M05.742 RHEUMATOID ARTHRITIS INVOLVING BOTH HANDS WITH POSITIVE RHEUMATOID FACTOR (HCC): ICD-10-CM

## 2024-08-06 DIAGNOSIS — M05.741 RHEUMATOID ARTHRITIS INVOLVING BOTH HANDS WITH POSITIVE RHEUMATOID FACTOR (HCC): ICD-10-CM

## 2024-08-07 RX ORDER — HYDROCODONE BITARTRATE AND ACETAMINOPHEN 5; 325 MG/1; MG/1
1 TABLET ORAL EVERY 6 HOURS PRN
Qty: 120 TABLET | Refills: 0 | Status: SHIPPED | OUTPATIENT
Start: 2024-08-07

## 2024-08-26 ENCOUNTER — ANTI-COAG (OUTPATIENT)
Dept: CARDIOLOGY | Age: 74
End: 2024-08-26

## 2024-08-26 DIAGNOSIS — I74.3 EMBOLISM AND THROMBOSIS OF ARTERIES OF LOWER EXTREMITY  (CMD): Primary | ICD-10-CM

## 2024-08-26 DIAGNOSIS — Z79.01 LONG TERM (CURRENT) USE OF ANTICOAGULANTS: ICD-10-CM

## 2024-08-29 ENCOUNTER — LAB ENCOUNTER (OUTPATIENT)
Dept: LAB | Age: 74
End: 2024-08-29
Attending: FAMILY MEDICINE
Payer: COMMERCIAL

## 2024-08-29 ENCOUNTER — TELEPHONE (OUTPATIENT)
Dept: CARDIOLOGY | Age: 74
End: 2024-08-29

## 2024-08-29 ENCOUNTER — ANTI-COAG (OUTPATIENT)
Dept: CARDIOLOGY | Age: 74
End: 2024-08-29

## 2024-08-29 DIAGNOSIS — Z79.01 LONG TERM (CURRENT) USE OF ANTICOAGULANTS: ICD-10-CM

## 2024-08-29 DIAGNOSIS — I74.3 EMBOLISM AND THROMBOSIS OF ARTERIES OF LOWER EXTREMITY  (CMD): Primary | ICD-10-CM

## 2024-08-29 DIAGNOSIS — R17 SERUM TOTAL BILIRUBIN ELEVATED: ICD-10-CM

## 2024-08-29 LAB
BILIRUB DIRECT SERPL-MCNC: 0.3 MG/DL (ref ?–0.3)
BILIRUB SERPL-MCNC: 1 MG/DL (ref 0.2–1.1)
INR BLDC: 1.9 (ref 0.9–1.1)
INR PPP: 1.9

## 2024-08-29 PROCEDURE — 82248 BILIRUBIN DIRECT: CPT | Performed by: FAMILY MEDICINE

## 2024-08-29 PROCEDURE — 82247 BILIRUBIN TOTAL: CPT | Performed by: FAMILY MEDICINE

## 2024-09-11 DIAGNOSIS — M05.741 RHEUMATOID ARTHRITIS INVOLVING BOTH HANDS WITH POSITIVE RHEUMATOID FACTOR (HCC): ICD-10-CM

## 2024-09-11 DIAGNOSIS — M05.742 RHEUMATOID ARTHRITIS INVOLVING BOTH HANDS WITH POSITIVE RHEUMATOID FACTOR (HCC): ICD-10-CM

## 2024-09-11 DIAGNOSIS — M19.071 OSTEOARTHRITIS OF RIGHT ANKLE, UNSPECIFIED OSTEOARTHRITIS TYPE: ICD-10-CM

## 2024-09-11 RX ORDER — HYDROCODONE BITARTRATE AND ACETAMINOPHEN 5; 325 MG/1; MG/1
1 TABLET ORAL EVERY 6 HOURS PRN
Qty: 120 TABLET | Refills: 0 | Status: SHIPPED | OUTPATIENT
Start: 2024-09-11

## 2024-09-11 NOTE — TELEPHONE ENCOUNTER
Requesting    HYDROcodone-acetaminophen 5-325 MG Oral Tab         Sig: Take 1 tablet by mouth every 6 (six) hours as needed. LABS DUE    Disp: 120 tablet    Refills: 0    Start: 9/11/2024    Earliest Fill Date: 9/11/2024    Class: Normal    Non-formulary For: Rheumatoid arthritis involving both hands with positive rheumatoid factor (HCC); Osteoarthritis of right ankle, unspecified osteoarthritis type    Last ordered: 1 month ago (8/7/2024) by Nini Ramirez MD    Controlled Substance Medication [id number]/11/2024 01:16 PM    This medication is a controlled substance - forward to provider to refill           LOV: 7/24/2024  RTC: 6 months   Last Relevant Labs: n/a   Filled: 8/7/2024 #120 with 0 refills    No future appointments.

## 2024-09-13 ENCOUNTER — ANTI-COAG (OUTPATIENT)
Dept: CARDIOLOGY | Age: 74
End: 2024-09-13

## 2024-09-13 DIAGNOSIS — Z79.01 LONG TERM (CURRENT) USE OF ANTICOAGULANTS: ICD-10-CM

## 2024-09-13 DIAGNOSIS — I74.3 EMBOLISM AND THROMBOSIS OF ARTERIES OF LOWER EXTREMITY  (CMD): Primary | ICD-10-CM

## 2024-09-20 ENCOUNTER — ANTI-COAG (OUTPATIENT)
Dept: CARDIOLOGY | Age: 74
End: 2024-09-20

## 2024-09-20 DIAGNOSIS — Z79.01 LONG TERM (CURRENT) USE OF ANTICOAGULANTS: ICD-10-CM

## 2024-09-20 DIAGNOSIS — I74.3 EMBOLISM AND THROMBOSIS OF ARTERIES OF LOWER EXTREMITY  (CMD): Primary | ICD-10-CM

## 2024-09-27 ENCOUNTER — ANTI-COAG (OUTPATIENT)
Dept: CARDIOLOGY | Age: 74
End: 2024-09-27

## 2024-09-27 DIAGNOSIS — I74.3 EMBOLISM AND THROMBOSIS OF ARTERIES OF LOWER EXTREMITY  (CMD): Primary | ICD-10-CM

## 2024-09-27 DIAGNOSIS — Z79.01 LONG TERM (CURRENT) USE OF ANTICOAGULANTS: ICD-10-CM

## 2024-09-29 DIAGNOSIS — E11.9 DIABETES MELLITUS WITHOUT COMPLICATION (HCC): ICD-10-CM

## 2024-09-29 RX ORDER — DAPAGLIFLOZIN 10 MG/1
10 TABLET, FILM COATED ORAL DAILY
Qty: 90 TABLET | Refills: 0 | Status: SHIPPED | OUTPATIENT
Start: 2024-09-29

## 2024-09-30 ENCOUNTER — NURSE ONLY (OUTPATIENT)
Dept: LAB | Age: 74
End: 2024-09-30
Attending: INTERNAL MEDICINE
Payer: COMMERCIAL

## 2024-09-30 ENCOUNTER — APPOINTMENT (OUTPATIENT)
Dept: CARDIOLOGY | Age: 74
End: 2024-09-30

## 2024-09-30 VITALS
SYSTOLIC BLOOD PRESSURE: 132 MMHG | WEIGHT: 263.78 LBS | BODY MASS INDEX: 48.54 KG/M2 | HEART RATE: 93 BPM | HEIGHT: 62 IN | DIASTOLIC BLOOD PRESSURE: 79 MMHG | OXYGEN SATURATION: 93 %

## 2024-09-30 DIAGNOSIS — G47.33 OSA (OBSTRUCTIVE SLEEP APNEA): ICD-10-CM

## 2024-09-30 DIAGNOSIS — I73.9 PAD (PERIPHERAL ARTERY DISEASE) (CMD): Primary | ICD-10-CM

## 2024-09-30 DIAGNOSIS — Z79.01 LONG TERM (CURRENT) USE OF ANTICOAGULANTS: ICD-10-CM

## 2024-09-30 DIAGNOSIS — I10 ESSENTIAL HYPERTENSION: ICD-10-CM

## 2024-09-30 DIAGNOSIS — E78.00 PURE HYPERCHOLESTEROLEMIA: ICD-10-CM

## 2024-09-30 LAB — INR BLDC: 2.3 (ref 0.9–1.1)

## 2024-09-30 PROCEDURE — 85610 PROTHROMBIN TIME: CPT

## 2024-09-30 PROCEDURE — 99215 OFFICE O/P EST HI 40 MIN: CPT | Performed by: INTERNAL MEDICINE

## 2024-09-30 PROCEDURE — 3078F DIAST BP <80 MM HG: CPT | Performed by: INTERNAL MEDICINE

## 2024-09-30 PROCEDURE — 36415 COLL VENOUS BLD VENIPUNCTURE: CPT

## 2024-09-30 PROCEDURE — 3075F SYST BP GE 130 - 139MM HG: CPT | Performed by: INTERNAL MEDICINE

## 2024-09-30 RX ORDER — SOLIFENACIN SUCCINATE 5 MG/1
1 TABLET, FILM COATED ORAL DAILY
COMMUNITY
Start: 2024-07-24

## 2024-09-30 SDOH — HEALTH STABILITY: PHYSICAL HEALTH: ON AVERAGE, HOW MANY DAYS PER WEEK DO YOU ENGAGE IN MODERATE TO STRENUOUS EXERCISE (LIKE A BRISK WALK)?: 0 DAYS

## 2024-09-30 SDOH — HEALTH STABILITY: PHYSICAL HEALTH: ON AVERAGE, HOW MANY MINUTES DO YOU ENGAGE IN EXERCISE AT THIS LEVEL?: 0 MIN

## 2024-09-30 ASSESSMENT — PATIENT HEALTH QUESTIONNAIRE - PHQ9
SUM OF ALL RESPONSES TO PHQ9 QUESTIONS 1 AND 2: 0
IS THE PATIENT ABLE TO COGNITIVELY COMPLETE THE PHQ9: NO
SUM OF ALL RESPONSES TO PHQ9 QUESTIONS 1 AND 2: 0
2. FEELING DOWN, DEPRESSED OR HOPELESS: NOT AT ALL
CLINICAL INTERPRETATION OF PHQ2 SCORE: NO FURTHER SCREENING NEEDED
1. LITTLE INTEREST OR PLEASURE IN DOING THINGS: NOT AT ALL

## 2024-10-01 ENCOUNTER — ANTI-COAG (OUTPATIENT)
Dept: CARDIOLOGY | Age: 74
End: 2024-10-01

## 2024-10-01 DIAGNOSIS — Z79.01 LONG TERM (CURRENT) USE OF ANTICOAGULANTS: ICD-10-CM

## 2024-10-01 DIAGNOSIS — I74.3 EMBOLISM AND THROMBOSIS OF ARTERIES OF LOWER EXTREMITY  (CMD): Primary | ICD-10-CM

## 2024-10-06 DIAGNOSIS — F32.A DEPRESSION, UNSPECIFIED DEPRESSION TYPE: ICD-10-CM

## 2024-10-06 DIAGNOSIS — E11.9 DIABETES MELLITUS WITHOUT COMPLICATION (HCC): ICD-10-CM

## 2024-10-06 DIAGNOSIS — I10 ESSENTIAL HYPERTENSION: ICD-10-CM

## 2024-10-07 RX ORDER — FLUOXETINE 40 MG/1
40 CAPSULE ORAL DAILY
Qty: 90 CAPSULE | Refills: 0 | Status: SHIPPED | OUTPATIENT
Start: 2024-10-07

## 2024-10-07 RX ORDER — METFORMIN HCL 500 MG
1500 TABLET, EXTENDED RELEASE 24 HR ORAL
Qty: 270 TABLET | Refills: 0 | Status: SHIPPED | OUTPATIENT
Start: 2024-10-07

## 2024-10-07 RX ORDER — FELODIPINE 10 MG/1
10 TABLET, EXTENDED RELEASE ORAL DAILY
Qty: 90 TABLET | Refills: 1 | Status: SHIPPED | OUTPATIENT
Start: 2024-10-07

## 2024-10-07 NOTE — TELEPHONE ENCOUNTER
Felodipine ER 10 mg  Filled 7-10-24  Qty 90  0 refills  No future appointments.  LOV 7-24-24 SM    Fluoxetine 40 mg  Filled 7-14-24  Qty 90  0 refills  No future appointments.  LOV 7-24-24 SM    Metformin  mg    Diabetes Medication Protocol Tctcon52/06/2024 01:03 PM   Protocol Details Last A1C < 7.5 and within past 6 months      Filled 7-10-24  Qty 270  0 refills  No future appointments.  LOV 7-24-24   Labs 7-24-24 Microalb./Creat.  7-23-24 A1c

## 2024-10-09 DIAGNOSIS — Z79.01 LONG TERM (CURRENT) USE OF ANTICOAGULANTS: Primary | ICD-10-CM

## 2024-10-09 DIAGNOSIS — I74.3 EMBOLISM AND THROMBOSIS OF ARTERIES OF LOWER EXTREMITY  (CMD): ICD-10-CM

## 2024-10-12 DIAGNOSIS — M19.071 OSTEOARTHRITIS OF RIGHT ANKLE, UNSPECIFIED OSTEOARTHRITIS TYPE: ICD-10-CM

## 2024-10-12 DIAGNOSIS — M05.741 RHEUMATOID ARTHRITIS INVOLVING BOTH HANDS WITH POSITIVE RHEUMATOID FACTOR (HCC): ICD-10-CM

## 2024-10-12 DIAGNOSIS — M05.742 RHEUMATOID ARTHRITIS INVOLVING BOTH HANDS WITH POSITIVE RHEUMATOID FACTOR (HCC): ICD-10-CM

## 2024-10-13 RX ORDER — HYDROCODONE BITARTRATE AND ACETAMINOPHEN 5; 325 MG/1; MG/1
1 TABLET ORAL EVERY 6 HOURS PRN
Qty: 120 TABLET | Refills: 0 | Status: SHIPPED | OUTPATIENT
Start: 2024-10-13

## 2024-11-02 DIAGNOSIS — I10 ESSENTIAL HYPERTENSION: ICD-10-CM

## 2024-11-03 RX ORDER — TRIAMTERENE AND HYDROCHLOROTHIAZIDE 37.5; 25 MG/1; MG/1
1 TABLET ORAL DAILY
Qty: 90 TABLET | Refills: 0 | Status: SHIPPED | OUTPATIENT
Start: 2024-11-03

## 2024-11-04 ENCOUNTER — ANTI-COAG (OUTPATIENT)
Dept: CARDIOLOGY | Age: 74
End: 2024-11-04

## 2024-11-04 DIAGNOSIS — Z79.01 LONG TERM (CURRENT) USE OF ANTICOAGULANTS: ICD-10-CM

## 2024-11-04 DIAGNOSIS — I74.3 EMBOLISM AND THROMBOSIS OF ARTERIES OF LOWER EXTREMITY  (CMD): Primary | ICD-10-CM

## 2024-11-11 ENCOUNTER — ANTI-COAG (OUTPATIENT)
Dept: CARDIOLOGY | Age: 74
End: 2024-11-11

## 2024-11-11 DIAGNOSIS — Z79.01 LONG TERM (CURRENT) USE OF ANTICOAGULANTS: ICD-10-CM

## 2024-11-11 DIAGNOSIS — I74.3 EMBOLISM AND THROMBOSIS OF ARTERIES OF LOWER EXTREMITY  (CMD): Primary | ICD-10-CM

## 2024-11-13 DIAGNOSIS — M19.071 OSTEOARTHRITIS OF RIGHT ANKLE, UNSPECIFIED OSTEOARTHRITIS TYPE: ICD-10-CM

## 2024-11-13 DIAGNOSIS — M05.741 RHEUMATOID ARTHRITIS INVOLVING BOTH HANDS WITH POSITIVE RHEUMATOID FACTOR (HCC): ICD-10-CM

## 2024-11-13 DIAGNOSIS — M05.742 RHEUMATOID ARTHRITIS INVOLVING BOTH HANDS WITH POSITIVE RHEUMATOID FACTOR (HCC): ICD-10-CM

## 2024-11-13 DIAGNOSIS — L30.4 INTERTRIGO: ICD-10-CM

## 2024-11-14 RX ORDER — HYDROCODONE BITARTRATE AND ACETAMINOPHEN 5; 325 MG/1; MG/1
1 TABLET ORAL EVERY 6 HOURS PRN
Qty: 120 TABLET | Refills: 0 | Status: SHIPPED | OUTPATIENT
Start: 2024-11-14

## 2024-11-14 RX ORDER — NYSTATIN 100000 U/G
1 CREAM TOPICAL 2 TIMES DAILY
Qty: 30 G | Refills: 1 | Status: SHIPPED | OUTPATIENT
Start: 2024-11-14

## 2024-11-14 NOTE — TELEPHONE ENCOUNTER
Norco 5-325 mg  Filled 10-13-24  Qty 120  0 refills  No future appointments.  LOV 7-24-24 SM    Nystatin 100,000 units  Filled 1-10-24  Qty 30 g  1 refill  No future appointments.  LOV 7-24-24 SM

## 2024-11-18 ENCOUNTER — ANTI-COAG (OUTPATIENT)
Dept: CARDIOLOGY | Age: 74
End: 2024-11-18

## 2024-11-18 DIAGNOSIS — Z79.01 LONG TERM (CURRENT) USE OF ANTICOAGULANTS: ICD-10-CM

## 2024-11-18 DIAGNOSIS — I74.3 EMBOLISM AND THROMBOSIS OF ARTERIES OF LOWER EXTREMITY  (CMD): Primary | ICD-10-CM

## 2024-11-22 ENCOUNTER — NURSE ONLY (OUTPATIENT)
Dept: LAB | Age: 74
End: 2024-11-22
Attending: INTERNAL MEDICINE
Payer: COMMERCIAL

## 2024-11-22 ENCOUNTER — ANTI-COAG (OUTPATIENT)
Dept: CARDIOLOGY | Age: 74
End: 2024-11-22

## 2024-11-22 ENCOUNTER — TELEPHONE (OUTPATIENT)
Dept: CARDIOLOGY | Age: 74
End: 2024-11-22

## 2024-11-22 DIAGNOSIS — Z79.01 LONG TERM (CURRENT) USE OF ANTICOAGULANTS: ICD-10-CM

## 2024-11-22 DIAGNOSIS — I74.3 EMBOLISM AND THROMBOSIS OF ARTERIES OF LOWER EXTREMITY  (CMD): Primary | ICD-10-CM

## 2024-11-22 LAB
INR BLDC: 3.2 (ref 0.9–1.1)
INR PPP: 3.2

## 2024-11-22 PROCEDURE — 36415 COLL VENOUS BLD VENIPUNCTURE: CPT

## 2024-11-22 PROCEDURE — 85610 PROTHROMBIN TIME: CPT

## 2024-12-16 ENCOUNTER — ANTI-COAG (OUTPATIENT)
Dept: CARDIOLOGY | Age: 74
End: 2024-12-16

## 2024-12-16 DIAGNOSIS — M19.071 OSTEOARTHRITIS OF RIGHT ANKLE, UNSPECIFIED OSTEOARTHRITIS TYPE: ICD-10-CM

## 2024-12-16 DIAGNOSIS — I74.3 EMBOLISM AND THROMBOSIS OF ARTERIES OF LOWER EXTREMITY  (CMD): Primary | ICD-10-CM

## 2024-12-16 DIAGNOSIS — M05.742 RHEUMATOID ARTHRITIS INVOLVING BOTH HANDS WITH POSITIVE RHEUMATOID FACTOR (HCC): ICD-10-CM

## 2024-12-16 DIAGNOSIS — Z79.01 LONG TERM (CURRENT) USE OF ANTICOAGULANTS: ICD-10-CM

## 2024-12-16 DIAGNOSIS — M05.741 RHEUMATOID ARTHRITIS INVOLVING BOTH HANDS WITH POSITIVE RHEUMATOID FACTOR (HCC): ICD-10-CM

## 2024-12-16 RX ORDER — HYDROCODONE BITARTRATE AND ACETAMINOPHEN 5; 325 MG/1; MG/1
1 TABLET ORAL EVERY 6 HOURS PRN
Qty: 120 TABLET | Refills: 0 | Status: SHIPPED | OUTPATIENT
Start: 2024-12-16

## 2024-12-23 ENCOUNTER — ANTI-COAG (OUTPATIENT)
Dept: CARDIOLOGY | Age: 74
End: 2024-12-23

## 2024-12-23 DIAGNOSIS — Z79.01 LONG TERM (CURRENT) USE OF ANTICOAGULANTS: ICD-10-CM

## 2024-12-23 DIAGNOSIS — I74.3 EMBOLISM AND THROMBOSIS OF ARTERIES OF LOWER EXTREMITY  (CMD): Primary | ICD-10-CM

## 2024-12-29 DIAGNOSIS — I10 ESSENTIAL HYPERTENSION: ICD-10-CM

## 2024-12-29 DIAGNOSIS — E11.9 DIABETES MELLITUS WITHOUT COMPLICATION (HCC): ICD-10-CM

## 2024-12-29 DIAGNOSIS — F32.A DEPRESSION, UNSPECIFIED DEPRESSION TYPE: ICD-10-CM

## 2024-12-30 ENCOUNTER — ANTI-COAG (OUTPATIENT)
Dept: CARDIOLOGY | Age: 74
End: 2024-12-30

## 2024-12-30 DIAGNOSIS — Z79.01 LONG TERM (CURRENT) USE OF ANTICOAGULANTS: ICD-10-CM

## 2024-12-30 DIAGNOSIS — I74.3 EMBOLISM AND THROMBOSIS OF ARTERIES OF LOWER EXTREMITY  (CMD): Primary | ICD-10-CM

## 2024-12-30 RX ORDER — FLUOXETINE HYDROCHLORIDE 40 MG/1
40 CAPSULE ORAL DAILY
Qty: 90 CAPSULE | Refills: 0 | OUTPATIENT
Start: 2024-12-30

## 2024-12-30 RX ORDER — FELODIPINE 10 MG/1
10 TABLET, EXTENDED RELEASE ORAL DAILY
Qty: 90 TABLET | Refills: 1 | OUTPATIENT
Start: 2024-12-30

## 2024-12-30 RX ORDER — DAPAGLIFLOZIN 10 MG/1
10 TABLET, FILM COATED ORAL DAILY
Qty: 90 TABLET | Refills: 0 | Status: SHIPPED | OUTPATIENT
Start: 2024-12-30 | End: 2025-04-06

## 2024-12-30 RX ORDER — SOLIFENACIN SUCCINATE 10 MG/1
10 TABLET, FILM COATED ORAL DAILY
Qty: 90 TABLET | Refills: 1 | OUTPATIENT
Start: 2024-12-30

## 2024-12-30 RX ORDER — TRIAMTERENE AND HYDROCHLOROTHIAZIDE 37.5; 25 MG/1; MG/1
1 TABLET ORAL DAILY
Qty: 90 TABLET | Refills: 0 | Status: SHIPPED | OUTPATIENT
Start: 2024-12-30 | End: 2025-04-06

## 2024-12-30 NOTE — TELEPHONE ENCOUNTER
Patient comment: Please note: 6 month physical was  scheduled today for 1-29-25 at 11:00am. Please order 6 month blood tests,  so l can get them done before my visit.     Farxiga 10 mg  Filled 9-29-24  Qty 90  0 refills  Future Appointments   Date Time Provider Department Center   1/29/2025 11:00 AM Fernanda Almonte APRN [facility]   LOV 7-24-24 SM    Felodipine ER 10 mg- Too early for refill  Filled 10-7-24  Qty 90  1 refill    Fluoxetine 40 mg  Filled 10-7-24  Qty 90  0 refills  Future Appointments   Date Time Provider Department Center   1/29/2025 11:00 AM Fernanda Almonte APRN [facility]   LOV 7-24-24 SM    Solifenacin 10 mg- Too early for refill  Filled 10-7-24  Qty 90  1 refill    Triamterene-hydrochlorothiazide 37.5-25 mg  Filled 11-3-24  Qty 90  0 refills  Future Appointments   Date Time Provider Department Center   1/29/2025 11:00 AM Fernanda Almonte APRN [facility]   LOV 7-24-24 SM

## 2025-01-05 DIAGNOSIS — F32.A DEPRESSION, UNSPECIFIED DEPRESSION TYPE: ICD-10-CM

## 2025-01-06 ENCOUNTER — ANTI-COAG (OUTPATIENT)
Dept: CARDIOLOGY | Age: 75
End: 2025-01-06

## 2025-01-06 ENCOUNTER — TELEPHONE (OUTPATIENT)
Dept: CARDIOLOGY | Age: 75
End: 2025-01-06

## 2025-01-06 DIAGNOSIS — Z79.01 LONG TERM (CURRENT) USE OF ANTICOAGULANTS: ICD-10-CM

## 2025-01-06 DIAGNOSIS — I74.3 EMBOLISM AND THROMBOSIS OF ARTERIES OF LOWER EXTREMITY  (CMD): Primary | ICD-10-CM

## 2025-01-06 RX ORDER — FLUOXETINE 40 MG/1
40 CAPSULE ORAL DAILY
Qty: 90 CAPSULE | Refills: 0 | Status: SHIPPED | OUTPATIENT
Start: 2025-01-06

## 2025-01-07 ENCOUNTER — TELEPHONE (OUTPATIENT)
Dept: INTERNAL MEDICINE CLINIC | Facility: CLINIC | Age: 75
End: 2025-01-07

## 2025-01-07 NOTE — TELEPHONE ENCOUNTER
Received fax stating pt has not had INR/ PT checked and that Pt missed one scheduled blood draw.    MCM sent to pt

## 2025-01-13 ENCOUNTER — ANTI-COAG (OUTPATIENT)
Dept: CARDIOLOGY | Age: 75
End: 2025-01-13

## 2025-01-13 DIAGNOSIS — Z79.01 LONG TERM (CURRENT) USE OF ANTICOAGULANTS: ICD-10-CM

## 2025-01-13 DIAGNOSIS — I74.3 EMBOLISM AND THROMBOSIS OF ARTERIES OF LOWER EXTREMITY  (CMD): Primary | ICD-10-CM

## 2025-01-17 DIAGNOSIS — E11.9 DIABETES MELLITUS WITHOUT COMPLICATION (HCC): ICD-10-CM

## 2025-01-17 DIAGNOSIS — M05.742 RHEUMATOID ARTHRITIS INVOLVING BOTH HANDS WITH POSITIVE RHEUMATOID FACTOR (HCC): ICD-10-CM

## 2025-01-17 DIAGNOSIS — M19.071 OSTEOARTHRITIS OF RIGHT ANKLE, UNSPECIFIED OSTEOARTHRITIS TYPE: ICD-10-CM

## 2025-01-17 DIAGNOSIS — M05.741 RHEUMATOID ARTHRITIS INVOLVING BOTH HANDS WITH POSITIVE RHEUMATOID FACTOR (HCC): ICD-10-CM

## 2025-01-17 RX ORDER — METFORMIN HYDROCHLORIDE 500 MG/1
1500 TABLET, EXTENDED RELEASE ORAL
Qty: 270 TABLET | Refills: 0 | Status: SHIPPED | OUTPATIENT
Start: 2025-01-17

## 2025-01-17 RX ORDER — HYDROCODONE BITARTRATE AND ACETAMINOPHEN 5; 325 MG/1; MG/1
1 TABLET ORAL EVERY 6 HOURS PRN
Qty: 120 TABLET | Refills: 0 | Status: SHIPPED | OUTPATIENT
Start: 2025-01-17

## 2025-01-20 ENCOUNTER — ANTI-COAG (OUTPATIENT)
Dept: CARDIOLOGY | Age: 75
End: 2025-01-20

## 2025-01-20 DIAGNOSIS — I74.3 EMBOLISM AND THROMBOSIS OF ARTERIES OF LOWER EXTREMITY  (CMD): Primary | ICD-10-CM

## 2025-01-20 DIAGNOSIS — Z79.01 LONG TERM (CURRENT) USE OF ANTICOAGULANTS: ICD-10-CM

## 2025-01-22 ENCOUNTER — TELEPHONE (OUTPATIENT)
Dept: INTERNAL MEDICINE CLINIC | Facility: CLINIC | Age: 75
End: 2025-01-22

## 2025-01-22 DIAGNOSIS — Z00.00 ROUTINE GENERAL MEDICAL EXAMINATION AT A HEALTH CARE FACILITY: ICD-10-CM

## 2025-01-22 DIAGNOSIS — E78.5 HYPERLIPIDEMIA, UNSPECIFIED HYPERLIPIDEMIA TYPE: Primary | ICD-10-CM

## 2025-01-22 DIAGNOSIS — E11.9 DIABETES MELLITUS WITHOUT COMPLICATION (HCC): ICD-10-CM

## 2025-01-22 NOTE — TELEPHONE ENCOUNTER
S/w patient and informed labs placed. Advised to check if insurance covers labs as it has not been a full year for cpx lab work.

## 2025-01-22 NOTE — TELEPHONE ENCOUNTER
Pt requesting annual lab orders be sent in asap so she can have before her appt 1/28. Pt says sent mcm about this as well but none in chart.    Pt would like c/b once in chart.

## 2025-01-24 ENCOUNTER — ANTI-COAG (OUTPATIENT)
Dept: CARDIOLOGY | Age: 75
End: 2025-01-24

## 2025-01-24 DIAGNOSIS — I74.3 EMBOLISM AND THROMBOSIS OF ARTERIES OF LOWER EXTREMITY  (CMD): Primary | ICD-10-CM

## 2025-01-24 DIAGNOSIS — Z79.01 LONG TERM (CURRENT) USE OF ANTICOAGULANTS: ICD-10-CM

## 2025-01-27 ENCOUNTER — LAB ENCOUNTER (OUTPATIENT)
Dept: LAB | Age: 75
End: 2025-01-27
Attending: FAMILY MEDICINE
Payer: COMMERCIAL

## 2025-01-27 ENCOUNTER — ANTI-COAG (OUTPATIENT)
Dept: CARDIOLOGY | Age: 75
End: 2025-01-27

## 2025-01-27 DIAGNOSIS — E78.2 MIXED HYPERLIPIDEMIA: ICD-10-CM

## 2025-01-27 DIAGNOSIS — Z79.01 LONG TERM (CURRENT) USE OF ANTICOAGULANTS: ICD-10-CM

## 2025-01-27 DIAGNOSIS — E78.5 HYPERLIPIDEMIA, UNSPECIFIED HYPERLIPIDEMIA TYPE: ICD-10-CM

## 2025-01-27 DIAGNOSIS — Z00.00 ROUTINE GENERAL MEDICAL EXAMINATION AT A HEALTH CARE FACILITY: ICD-10-CM

## 2025-01-27 DIAGNOSIS — E11.9 DIABETES MELLITUS WITHOUT COMPLICATION (HCC): ICD-10-CM

## 2025-01-27 DIAGNOSIS — I74.3 EMBOLISM AND THROMBOSIS OF ARTERIES OF LOWER EXTREMITIES (HCC): ICD-10-CM

## 2025-01-27 DIAGNOSIS — I74.3 EMBOLISM AND THROMBOSIS OF ARTERIES OF LOWER EXTREMITY  (CMD): Primary | ICD-10-CM

## 2025-01-27 LAB
ALBUMIN SERPL-MCNC: 4.4 G/DL (ref 3.2–4.8)
ALBUMIN/GLOB SERPL: 1.5 {RATIO} (ref 1–2)
ALP LIVER SERPL-CCNC: 75 U/L
ALT SERPL-CCNC: 30 U/L
ANION GAP SERPL CALC-SCNC: 9 MMOL/L (ref 0–18)
AST SERPL-CCNC: 22 U/L (ref ?–34)
BASOPHILS # BLD AUTO: 0.06 X10(3) UL (ref 0–0.2)
BASOPHILS NFR BLD AUTO: 0.9 %
BILIRUB SERPL-MCNC: 1 MG/DL (ref 0.2–1.1)
BUN BLD-MCNC: 13 MG/DL (ref 9–23)
BUN/CREAT SERPL: 15.5 (ref 10–20)
CALCIUM BLD-MCNC: 10.2 MG/DL (ref 8.7–10.4)
CHLORIDE SERPL-SCNC: 101 MMOL/L (ref 98–112)
CHOLEST SERPL-MCNC: 158 MG/DL (ref ?–200)
CO2 SERPL-SCNC: 31 MMOL/L (ref 21–32)
CREAT BLD-MCNC: 0.84 MG/DL
DEPRECATED RDW RBC AUTO: 45.9 FL (ref 35.1–46.3)
EGFRCR SERPLBLD CKD-EPI 2021: 74 ML/MIN/1.73M2 (ref 60–?)
EOSINOPHIL # BLD AUTO: 0.33 X10(3) UL (ref 0–0.7)
EOSINOPHIL NFR BLD AUTO: 4.7 %
ERYTHROCYTE [DISTWIDTH] IN BLOOD BY AUTOMATED COUNT: 14.3 % (ref 11–15)
EST. AVERAGE GLUCOSE BLD GHB EST-MCNC: 189 MG/DL (ref 68–126)
FASTING PATIENT LIPID ANSWER: YES
FASTING STATUS PATIENT QL REPORTED: YES
GLOBULIN PLAS-MCNC: 3 G/DL (ref 2–3.5)
GLUCOSE BLD-MCNC: 202 MG/DL (ref 70–99)
HBA1C MFR BLD: 8.2 % (ref ?–5.7)
HCT VFR BLD AUTO: 47.1 %
HDLC SERPL-MCNC: 53 MG/DL (ref 40–59)
HGB BLD-MCNC: 15.3 G/DL
IMM GRANULOCYTES # BLD AUTO: 0.03 X10(3) UL (ref 0–1)
IMM GRANULOCYTES NFR BLD: 0.4 %
INR BLDC: 2.3 (ref 0.9–1.1)
INR PPP: 2.3
LDLC SERPL CALC-MCNC: 77 MG/DL (ref ?–100)
LYMPHOCYTES # BLD AUTO: 1.09 X10(3) UL (ref 1–4)
LYMPHOCYTES NFR BLD AUTO: 15.6 %
MCH RBC QN AUTO: 28.8 PG (ref 26–34)
MCHC RBC AUTO-ENTMCNC: 32.5 G/DL (ref 31–37)
MCV RBC AUTO: 88.7 FL
MONOCYTES # BLD AUTO: 0.49 X10(3) UL (ref 0.1–1)
MONOCYTES NFR BLD AUTO: 7 %
NEUTROPHILS # BLD AUTO: 5 X10 (3) UL (ref 1.5–7.7)
NEUTROPHILS # BLD AUTO: 5 X10(3) UL (ref 1.5–7.7)
NEUTROPHILS NFR BLD AUTO: 71.4 %
NONHDLC SERPL-MCNC: 105 MG/DL (ref ?–130)
OSMOLALITY SERPL CALC.SUM OF ELEC: 298 MOSM/KG (ref 275–295)
PLATELET # BLD AUTO: 227 10(3)UL (ref 150–450)
POTASSIUM SERPL-SCNC: 4 MMOL/L (ref 3.5–5.1)
PROT SERPL-MCNC: 7.4 G/DL (ref 5.7–8.2)
RBC # BLD AUTO: 5.31 X10(6)UL
SODIUM SERPL-SCNC: 141 MMOL/L (ref 136–145)
TRIGL SERPL-MCNC: 162 MG/DL (ref 30–149)
TSI SER-ACNC: 1.59 UIU/ML (ref 0.55–4.78)
VLDLC SERPL CALC-MCNC: 25 MG/DL (ref 0–30)
WBC # BLD AUTO: 7 X10(3) UL (ref 4–11)

## 2025-01-27 PROCEDURE — 80050 GENERAL HEALTH PANEL: CPT | Performed by: FAMILY MEDICINE

## 2025-01-27 PROCEDURE — 83036 HEMOGLOBIN GLYCOSYLATED A1C: CPT | Performed by: FAMILY MEDICINE

## 2025-01-27 PROCEDURE — 80061 LIPID PANEL: CPT | Performed by: FAMILY MEDICINE

## 2025-01-27 RX ORDER — WARFARIN SODIUM 5 MG/1
5 TABLET ORAL DAILY
Qty: 90 TABLET | Refills: 0 | Status: SHIPPED | OUTPATIENT
Start: 2025-01-27

## 2025-01-27 RX ORDER — ATORVASTATIN CALCIUM 20 MG/1
20 TABLET, FILM COATED ORAL DAILY
Qty: 90 TABLET | Refills: 0 | Status: SHIPPED | OUTPATIENT
Start: 2025-01-27

## 2025-01-28 ENCOUNTER — TELEPHONE (OUTPATIENT)
Dept: CARDIOLOGY | Age: 75
End: 2025-01-28

## 2025-01-29 ENCOUNTER — NURSE ONLY (OUTPATIENT)
Dept: INTERNAL MEDICINE CLINIC | Facility: CLINIC | Age: 75
End: 2025-01-29
Payer: COMMERCIAL

## 2025-01-29 ENCOUNTER — OFFICE VISIT (OUTPATIENT)
Dept: INTERNAL MEDICINE CLINIC | Facility: CLINIC | Age: 75
End: 2025-01-29
Payer: COMMERCIAL

## 2025-01-29 VITALS
DIASTOLIC BLOOD PRESSURE: 70 MMHG | BODY MASS INDEX: 49.39 KG/M2 | SYSTOLIC BLOOD PRESSURE: 130 MMHG | OXYGEN SATURATION: 95 % | HEART RATE: 85 BPM | WEIGHT: 265 LBS | TEMPERATURE: 97 F | HEIGHT: 61.5 IN

## 2025-01-29 DIAGNOSIS — M05.741 RHEUMATOID ARTHRITIS INVOLVING BOTH HANDS WITH POSITIVE RHEUMATOID FACTOR (HCC): ICD-10-CM

## 2025-01-29 DIAGNOSIS — E11.65 TYPE 2 DIABETES MELLITUS WITH HYPERGLYCEMIA, WITHOUT LONG-TERM CURRENT USE OF INSULIN (HCC): ICD-10-CM

## 2025-01-29 DIAGNOSIS — Z12.31 ENCOUNTER FOR SCREENING MAMMOGRAM FOR MALIGNANT NEOPLASM OF BREAST: ICD-10-CM

## 2025-01-29 DIAGNOSIS — Z13.820 SCREENING FOR OSTEOPOROSIS: ICD-10-CM

## 2025-01-29 DIAGNOSIS — M05.742 RHEUMATOID ARTHRITIS INVOLVING BOTH HANDS WITH POSITIVE RHEUMATOID FACTOR (HCC): ICD-10-CM

## 2025-01-29 DIAGNOSIS — G89.29 CHRONIC BILATERAL LOW BACK PAIN WITHOUT SCIATICA: ICD-10-CM

## 2025-01-29 DIAGNOSIS — M54.50 CHRONIC BILATERAL LOW BACK PAIN WITHOUT SCIATICA: ICD-10-CM

## 2025-01-29 DIAGNOSIS — Z00.00 ROUTINE GENERAL MEDICAL EXAMINATION AT A HEALTH CARE FACILITY: Primary | ICD-10-CM

## 2025-01-29 DIAGNOSIS — N39.3 STRESS INCONTINENCE, FEMALE: ICD-10-CM

## 2025-01-29 DIAGNOSIS — F33.0 MILD EPISODE OF RECURRENT MAJOR DEPRESSIVE DISORDER: ICD-10-CM

## 2025-01-29 DIAGNOSIS — Z12.11 ENCOUNTER FOR SCREENING FECAL OCCULT BLOOD TESTING: ICD-10-CM

## 2025-01-29 DIAGNOSIS — E78.5 HYPERLIPIDEMIA, UNSPECIFIED HYPERLIPIDEMIA TYPE: ICD-10-CM

## 2025-01-29 DIAGNOSIS — I10 HTN (HYPERTENSION), BENIGN: ICD-10-CM

## 2025-01-29 LAB
CREAT UR-SCNC: 46.4 MG/DL
MICROALBUMIN UR-MCNC: 1.7 MG/DL
MICROALBUMIN/CREAT 24H UR-RTO: 36.6 UG/MG (ref ?–30)

## 2025-01-29 PROCEDURE — 82043 UR ALBUMIN QUANTITATIVE: CPT | Performed by: FAMILY MEDICINE

## 2025-01-29 PROCEDURE — 92229 IMG RTA DETC/MNTR DS POC ALY: CPT | Performed by: FAMILY MEDICINE

## 2025-01-29 PROCEDURE — 2033F EYE IMG VALID W/O RTNOPTHY: CPT | Performed by: FAMILY MEDICINE

## 2025-01-29 PROCEDURE — 82570 ASSAY OF URINE CREATININE: CPT | Performed by: FAMILY MEDICINE

## 2025-01-29 RX ORDER — MIRABEGRON 25 MG/1
25 TABLET, FILM COATED, EXTENDED RELEASE ORAL DAILY
Qty: 90 TABLET | Refills: 0 | Status: SHIPPED | OUTPATIENT
Start: 2025-01-29

## 2025-01-29 NOTE — PROGRESS NOTES
HPI:   Amee Bermudez is a 74 year old female who presents for a complete physical exam. Symptoms: denies discharge, itching, burning or dysuria, is menopausal.    Regular SBE- occ,Sexually active- occ,  Contraception- post menopausal. STD history- none  Patient is aware she is here for a physical today but would also like to discuss other issues,or get medications refilled today. Patient aware that these concerns my not be covered by their Px.     Amee Bermudez is a 71 year old female who presents for recheck of her diabetes. Pt's weight is up one pound  Patient’s FBS have been 110-160's.  Pt tests her blood few times per week.  Last visit with ophthalmologist was 5/23/18. Pt aware she needs to go but will do her retinal exam her today  Pt has been checking her feet on a regular basis. Pt denies any tingling of the feet.      Patient presents for recheck of their hypertension/hyperlipdemia. Pt has been taking medications as instructed, no medication side effects, home BP monitoring has not been done  Currently asymptomatic, no chest pains, jaw pains, arm pains. No headaches, dizziness or edema.  No SOB on exertion or rest.  Pt has been following a low fat diet and has been walking some.     Pt is here for a recheck of depression. Has been tolerating the meds well. Denies any side effects from the meds. Mood has been good. Patient's appetite is good.Pt denies headaches,tics,or insomnia. Pt has felt a little anxious lately but nothing she can not handle.No suicidal ideations. Would like to continue the same medication.     The patient is here to recheck her RA/OA. Pt has pain in  the following joints: hands,bilateral knees /ankles and back. Pt has had these symptoms for years and has seen Rheumatology in the past. Pt has been using the following medication: Norco 1-3 per day. Pain issues controlled by Norco since she cannot take nsaids because of warfarin use.  Pt describes her sx's as good controlled. Symptoms are  worsened by increase walking. Pt has become more sedentary since she retired and knows she has to get moving more.  Pt has noticed more back pain and needs an order for a walker and a shower chair. Discussed doing a lumbar xray and Pt would like to get that done    The patient is here to f/u on stress incontenence. The symptoms have been improving on the medication. But are not totally gone. Patient still has some leakage and still wears a pad. Pt not sure what else she can do. . Denies any abdominal pain. Pt is on vesicare but does not feel it is working well for her. Will change Pt to Myrbetriq.    Screening:  Immunization History   Administered Date(s) Administered    Covid-19 Vaccine Pfizer 30 mcg/0.3 ml 02/08/2021, 03/01/2021, 10/19/2021    FLUZONE 6 months and older PFS 0.5 ml (99805) 02/19/2015    Influenza 11/08/2013    Pneumococcal (Prevnar 13) 03/21/2018    Pneumovax 23 06/25/2020    TDAP 12/17/2020    Zoster Vaccine Live (Zostavax) 05/17/2012    Zoster Vaccine Recombinant Adjuvanted (Shingrix) 12/17/2020, 10/28/2021      Menarche- 15 yr  PAP- aged out  Any abnormal pap smears- 30+ years ago  Pregnancies- 1, Live births- 1  Mammogram-  ? DUE  Any breast cancer- none, or any gynecological cancer- none, any cancers -paternal cousin lung cancer   Labs- 1/27/25  DEXA over 65yr- ? DUE  Flu shot-  allergic to it  Colon- never, DUE  Glasses/contacts- yes, last exam- ?  Dental visits- ? 5 years ago    Immunization History   Administered Date(s) Administered    Covid-19 Vaccine Pfizer 30 mcg/0.3 ml 02/08/2021, 03/01/2021, 10/19/2021    FLUZONE 6 months and older PFS 0.5 ml (33934) 02/19/2015    Influenza 11/08/2013    Pneumococcal (Prevnar 13) 03/21/2018    Pneumovax 23 06/25/2020    TDAP 12/17/2020    Zoster Vaccine Live (Zostavax) 05/17/2012    Zoster Vaccine Recombinant Adjuvanted (Shingrix) 12/17/2020, 10/28/2021     Wt Readings from Last 6 Encounters:   01/29/25 265 lb (120.2 kg)   07/24/24 264 lb 6.4 oz  (119.9 kg)   01/10/24 267 lb 9.6 oz (121.4 kg)   06/14/23 279 lb (126.6 kg)   01/04/23 272 lb 12.8 oz (123.7 kg)   07/12/22 271 lb (122.9 kg)     Body mass index is 49.26 kg/m².     Lab Results   Component Value Date     (H) 01/27/2025     (H) 07/23/2024     (H) 06/13/2023     Lab Results   Component Value Date    CHOLEST 158 01/27/2025    CHOLEST 152 07/23/2024    CHOLEST 145 01/07/2024     Lab Results   Component Value Date    HDL 53 01/27/2025    HDL 50 07/23/2024    HDL 55 01/07/2024     Lab Results   Component Value Date    LDL 77 01/27/2025    LDL 75 07/23/2024    LDL 64 01/07/2024     Lab Results   Component Value Date    AST 22 01/27/2025    AST 21 07/23/2024    AST 29 06/13/2023     Lab Results   Component Value Date    ALT 30 01/27/2025    ALT 29 07/23/2024    ALT 48 06/13/2023       Current Outpatient Medications   Medication Sig Dispense Refill    warfarin 5 MG Oral Tab Take 1 tablet (5 mg total) by mouth daily. 90 tablet 0    atorvastatin 20 MG Oral Tab Take 1 tablet (20 mg total) by mouth daily. 90 tablet 0    metFORMIN  MG Oral Tablet 24 Hr Take 3 tablets (1,500 mg total) by mouth daily with breakfast. 270 tablet 0    HYDROcodone-acetaminophen 5-325 MG Oral Tab Take 1 tablet by mouth every 6 (six) hours as needed. 120 tablet 0    FLUOXETINE HCL 40 MG Oral Cap TAKE 1 CAPSULE(40 MG) BY MOUTH DAILY 90 capsule 0    dapagliflozin (FARXIGA) 10 MG Oral Tab Take 1 tablet (10 mg total) by mouth daily. 90 tablet 0    Triamterene-HCTZ 37.5-25 MG Oral Tab Take 1 tablet by mouth daily. 90 tablet 0    nystatin 100,000 Units/g External Cream Apply 1 Application topically 2 (two) times daily. 30 g 1    felodipine ER 10 MG Oral Tablet 24 Hr Take 1 tablet (10 mg total) by mouth daily. 90 tablet 1    Solifenacin Succinate (VESICARE) 10 MG Oral Tab Take 1 tablet (10 mg total) by mouth daily. 90 tablet 1    Glucose Blood (CONTOUR NEXT TEST) In Vitro Strip Patient tests her glucose twice a day  200 strip 1    Microlet Lancets Does not apply Misc 1 lancet  by Finger stick route 2 (two) times daily. 200 each 1    Nystatin 951951 UNIT/GM External Powder Apply 1 Application topically 4 (four) times daily. (Patient taking differently: Apply 1 Application  topically 4 (four) times daily as needed.) 60 g 1      Past Medical History:    Allergic rhinitis    Anxiety    Arthritis    Depression    Diabetes (HCC)    Hyperlipidemia    Knee osteoarthritis    Obesity    Osteoarthritis    Sleep apnea    Solitary kidney, congenital    Unspecified essential hypertension      Past Surgical History:   Procedure Laterality Date    Cataract  5yrs ago    Cholecystectomy        89    Other surgical history  ,     left femoral angiogram/angioplasty and stent    Other surgical history  age 29    right ankle fx (slipped on ice)    Tonsillectomy  67 yrs ago      Family History   Problem Relation Age of Onset    Other (Parkinson's disease) Father     Other (Uremic poisoning) Maternal Grandmother     Heart Disease Paternal Grandmother     Heart Attack Maternal Grandfather         AMI    Other (COPD) Mother       Social History:   Social History     Socioeconomic History    Marital status:    Tobacco Use    Smoking status: Former     Current packs/day: 0.00     Types: Cigarettes     Quit date: 1981     Years since quittin.1    Smokeless tobacco: Never   Vaping Use    Vaping status: Never Used   Substance and Sexual Activity    Alcohol use: Yes     Comment: One in a while, 1 glass of wine    Drug use: Never   Other Topics Concern    Caffeine Concern No    Stress Concern No    Weight Concern No    Special Diet No    Exercise No    Seat Belt Yes     Social Drivers of Health     Physical Activity: High Risk (2024)    [facility]    Exercise Vital Sign     On average, how many days per week do you engage in moderate to strenuous exercise (like a brisk walk)?: 0 days     On  average, how many minutes do you engage in exercise at this level?: 0 min     Occ: retired teacher. : yes. Children: 1.   Exercise: minimal.  Diet: watches fats closely and watches sugar closely     REVIEW OF SYSTEMS:   GENERAL: feels well otherwise  SKIN: denies any unusual skin lesions  EYES:denies blurred vision or double vision  HEENT: denies nasal congestion, sinus pain or ST  LUNGS: denies shortness of breath with exertion,+FOREST  CARDIOVASCULAR: denies chest pain on exertion,+HTN  GI: denies abdominal pain,denies heartburn  : denies dysuria, vaginal discharge or itching,se HPI  MUSCULOSKELETAL: hx back pain,+OA,+RA  NEURO: denies headaches  PSYCHE: +depression   HEMATOLOGIC: denies hx of anemia  ENDOCRINE: denies thyroid history,+DM  ALL/ASTHMA: denies hx of allergy or asthma    EXAM:   /70 (BP Location: Right arm, Patient Position: Sitting, Cuff Size: large)   Pulse 85   Temp 97.4 °F (36.3 °C) (Temporal)   Ht 5' 1.5\" (1.562 m)   Wt 265 lb (120.2 kg)   SpO2 95%   BMI 49.26 kg/m²   Body mass index is 49.26 kg/m².   GENERAL: well developed, well nourished,in no apparent distress  SKIN: no rashes,no suspicious lesions  HEENT: atraumatic, normocephalic,ears and throat are clear  EYES:PERRLA, EOMI, normal optic disk,conjunctiva are clear  NECK: supple,no adenopathy,no bruits  CHEST: no chest tenderness  BREAST: no dominant or suspicious mass  LUNGS: clear to auscultation  CARDIO: RRR without murmur  GI: good BS's,no masses, HSM or tenderness  :introitus is normal,scant discharge,cervix is pink,no adnexal masses or tenderness, PAP was done   RECTAL:good rectal tone, stool is OB negative  MUSCULOSKELETAL:+ lower back tense,tight and tender  EXTREMITIES: no cyanosis, clubbing or edema  Bilateral barefoot skin diabetic exam is normal, visualized feet and the appearance is normal.  Bilateral monofilament/sensation of both feet is normal.  Pulsation pedal pulse exam of both lower legs/feet is normal  as well.  NEURO: Oriented times three,cranial nerves are intact,motor and sensory are grossly intact  PSYCH:  Speech, mood and affect are appropriate      ASSESSMENT AND PLAN:   Amee Bermudez is a 74 year old female who presents for a complete physical exam. Order put in for mammogram and dexascan. Labs up to date. Discussed results with Pt. Pt needs to see Eugenie Yu. Pt referred for screening colonoscopy but refused. Pt will do the FIT card. Pt' s weight is Body mass index is 49.26 kg/m²., recommended low fat diet and aerobic exercise 30 minutes three times weekly.  The patient indicates understanding of these issues and agrees to the plan.        .1. Encounter for screening mammogram for malignant neoplasm of breast    - LAUREN MIRNA 2D+3D SCREENING BILAT (CPT=77067/80170); Future    2. Screening for osteoporosis    - XR DEXA BONE DENSITOMETRY (CPT=77080); Future    3. Encounter for screening fecal occult blood testing    - Occult Blood, Fecal, FIT Immunoassay; Future    4. Routine general medical examination at a health care facility  Stable well adult. The patient is asked to return for CPX in one year.    5. Type 2 diabetes mellitus with hyperglycemia, without long-term current use of insulin (HCC)  Continue present medications. Check sugars daily or as directed.  Eat a low fat, low carb, low cholesterol diet. Call with sugars less than 70 or greater than 300.   Please see your specialists as recommended.  Keep up with regular eye exams and Check feet daily. Stay on your low salt, diabetic diet.  Stay as active as you can.  Referral to Eugenie Yu.  - Diabetic Retinopathy Exam  OU - Both Eyes; Future    6. Chronic bilateral low back pain without sciatica  - -rest, ice/heat, pain medication as needed.  - xrays-> await results may need PT or Neuro spine.  - XR LUMBAR SPINE (MIN 4 VIEWS) (CPT=72110); Future  - DME - External    7. Rheumatoid arthritis involving both hands with positive rheumatoid factor  (HCC)  Stable, continue medications  - DME - External    8. HTN (hypertension), benign  Conservative measures dicussed. Continue medication.  Diet and exercise explained and encouraged.  Home blood pressure monitoring. Pt should measure BP’s two to three times per week. Goal blood pressure at home - < 135/85.       9. Hyperlipidemia, unspecified hyperlipidemia type  Pt to continue their medication, increase exercise,  choose better fats,  increase fiber and avoid processed foods.    10. Mild episode of recurrent major depressive disorder  -stable, continue medication    11. Stress incontinence, female  Stop Vesicare and change to Myrbetriq. Script sent to her pharmacy    Encounter Diagnoses   Name Primary?    Encounter for screening mammogram for malignant neoplasm of breast     Screening for osteoporosis     Encounter for screening fecal occult blood testing     Routine general medical examination at a health care facility Yes    Type 2 diabetes mellitus with hyperglycemia, without long-term current use of insulin (HCC)     Chronic bilateral low back pain without sciatica     Rheumatoid arthritis involving both hands with positive rheumatoid factor (HCC)     HTN (hypertension), benign     Hyperlipidemia, unspecified hyperlipidemia type     Mild episode of recurrent major depressive disorder     Stress incontinence, female        Orders Placed This Encounter   Procedures    Occult Blood, Fecal, FIT Immunoassay    Diabetic Retinopathy Exam  OU - Both Eyes       Meds & Refills for this Visit:  Requested Prescriptions     Signed Prescriptions Disp Refills    Mirabegron ER (MYRBETRIQ) 25 MG Oral Tablet 24 Hr 90 tablet 0     Sig: Take 1 tablet (25 mg total) by mouth daily.       Imaging & Consults:  DME - EXTERNAL   XR DEXA BONE DENSITOMETRY (CPT=77080)  [facility] MIRNA 2D+3D SCREENING BILAT (CPT=77067/62747)  XR LUMBAR SPINE (MIN 4 VIEWS) (CPT=72110)

## 2025-02-04 NOTE — PROGRESS NOTES
Amee Bermudez is a 74 year old presenting today to establish for type 2 diabetes management.   Primary care physician: Nini Ramirez MD  Most recent A1C 8.2% ( last A1C  8.1% )    mg/dl today--> non fasting;   Last meal: breakfast ~  10 am: bagel w butter/jelly : black coffee     Tests her BG occasionally : usually in AM ~ 1 weekly : trends range : 170 -200 mg/dl    Diagnosed T2DM ~in her 60s off blood tests. Hx pre diabetes ( attended pre diabetes seminars and lost 10-15 # )   Was seeing CDE 2019- 2021 and was maintaining her glycemic control   Weight has decreased so she is frustrated that her trends have increased   Was diagnosed with RA  ~ 2015  No steroids or prescription .limited options w prescription due to warfarin       Lab Results   Component Value Date    A1C 8.2 (H) 01/27/2025    A1C 8.1 (H) 07/23/2024    A1C 8.0 (H) 01/07/2024    A1C 8.6 (H) 10/22/2023    A1C 9.8 (H) 06/13/2023        Family history: Mother late onset T2 Diabetes   Denies other autoimmune disorders in family/;patient          Diabetes History:  Pre Diabetes   T2DM since ~ 2015   Patient has not had hospitalizations for blood sugar issues  denies any history of pancreatitis.       Previous DM therapies:  none     Current DM Regimen:  metformin ER : 3 tab daily   Farxiga 10mg once daily         HGBA1C:    Lab Results   Component Value Date    A1C 8.2 (H) 01/27/2025    A1C 8.1 (H) 07/23/2024    A1C 8.0 (H) 01/07/2024     (H) 01/27/2025       Lab Results   Component Value Date    CHOLEST 158 01/27/2025    CHOLEST 152 07/23/2024    TRIG 162 (H) 01/27/2025    TRIG 155 (H) 07/23/2024    HDL 53 01/27/2025    HDL 50 07/23/2024    LDL 77 01/27/2025    LDL 75 07/23/2024     Lab Results   Component Value Date    MICROALBCREA 36.6 (H) 01/29/2025    MICROALBCREA 82.3 (H) 07/24/2024      Lab Results   Component Value Date    CREATSERUM 0.84 01/27/2025    CREATSERUM 0.86 07/23/2024    EGFRCR 74 01/27/2025    EGFRCR 72 07/23/2024     Lab  Results   Component Value Date    AST 22 2025    AST 21 2024    ALT 30 2025    ALT 29 2024       Lab Results   Component Value Date    TSH 1.586 2025    TSH 1.701 2024             DM Complications:  Microvascular:   Neuropathy: no  Retinopathy: yes  Nephropathy: yes    Macrovascular:  PVD: yes  CAD No  Stroke/CVA: no    HX Probable thrombophilia: On Coumadin and being followed in Coumadin clinic , [facility]     FOREST; no CPAP       Modifying factors:  Medication adherence: yes   Barriers: none    Recent steroids, illness or infections ( past 3m): no     Allergies: Cat hair extract and Seasonal    Past Medical History:    Allergic rhinitis    Anxiety    Arthritis    Depression    Diabetes (HCC)    Hyperlipidemia    Knee osteoarthritis    Obesity    Osteoarthritis    Sleep apnea    Solitary kidney, congenital    Unspecified essential hypertension     Past Surgical History:   Procedure Laterality Date    Cataract  5yrs ago    Cholecystectomy        89    Other surgical history  ,     left femoral angiogram/angioplasty and stent    Other surgical history  age 29    right ankle fx (slipped on ice)    Tonsillectomy  67 yrs ago     Social History     Socioeconomic History    Marital status:    Tobacco Use    Smoking status: Former     Current packs/day: 0.00     Types: Cigarettes     Quit date: 1981     Years since quittin.1    Smokeless tobacco: Never   Vaping Use    Vaping status: Never Used   Substance and Sexual Activity    Alcohol use: Yes     Comment: One in a while, 1 glass of wine    Drug use: Never   Other Topics Concern    Caffeine Concern No    Stress Concern No    Weight Concern No    Special Diet No    Exercise No    Seat Belt Yes     Family History   Problem Relation Age of Onset    Other (Parkinson's disease) Father     Other (Uremic poisoning) Maternal Grandmother     Heart Disease Paternal Grandmother     Heart Attack Maternal  Grandfather         AMI    Other (COPD) Mother      Current Medication List:   Current Outpatient Medications   Medication Sig Dispense Refill    semaglutide (OZEMPIC, 0.25 OR 0.5 MG/DOSE,) 2 MG/3ML Subcutaneous Solution Pen-injector Inject 0.5 mg into the skin once a week. 9 mL 0    Mirabegron ER (MYRBETRIQ) 25 MG Oral Tablet 24 Hr Take 1 tablet (25 mg total) by mouth daily. 90 tablet 0    warfarin 5 MG Oral Tab Take 1 tablet (5 mg total) by mouth daily. 90 tablet 0    atorvastatin 20 MG Oral Tab Take 1 tablet (20 mg total) by mouth daily. 90 tablet 0    metFORMIN  MG Oral Tablet 24 Hr Take 3 tablets (1,500 mg total) by mouth daily with breakfast. 270 tablet 0    FLUOXETINE HCL 40 MG Oral Cap TAKE 1 CAPSULE(40 MG) BY MOUTH DAILY 90 capsule 0    dapagliflozin (FARXIGA) 10 MG Oral Tab Take 1 tablet (10 mg total) by mouth daily. 90 tablet 0    Triamterene-HCTZ 37.5-25 MG Oral Tab Take 1 tablet by mouth daily. 90 tablet 0    felodipine ER 10 MG Oral Tablet 24 Hr Take 1 tablet (10 mg total) by mouth daily. 90 tablet 1    HYDROcodone-acetaminophen 5-325 MG Oral Tab Take 1 tablet by mouth every 6 (six) hours as needed. 120 tablet 0    nystatin 100,000 Units/g External Cream Apply 1 Application topically 2 (two) times daily. 30 g 1    Glucose Blood (CONTOUR NEXT TEST) In Vitro Strip Patient tests her glucose twice a day 200 strip 1    Microlet Lancets Does not apply Misc 1 lancet  by Finger stick route 2 (two) times daily. 200 each 1    Nystatin 089782 UNIT/GM External Powder Apply 1 Application topically 4 (four) times daily. (Patient taking differently: Apply 1 Application  topically 4 (four) times daily as needed.) 60 g 1           DM associated review of  symptoms:   Endocrine: Polyuria, polyphagia, polydipsia: no  Neurological: Paresthesias: no  HEENT: Blurred vision: no  Skin: no rash or wounds  Hematological: Hypoglycemia: no      Review of Systems     LUNGS: denies shortness of breath   CARDIOVASCULAR: denies  chest pain  GI: denies abdominal pain, nausea or diarrhea   : denies dysuria or mycotic infections   MUSCULOSKELETAL: + RA/OA  pain        Physical exam:  /68   Pulse 88   Resp 17   Wt 263 lb 12.8 oz (119.7 kg)   SpO2 98%   BMI 49.04 kg/m²   Body mass index is 49.04 kg/m².    Physical Exam   Vitals reviewed.  Constitutional: Normal appearance   Cardiovascular: Normal rate   Pulmonary/Chest: Effort normal  Neurological: Alert and oriented .   Psychiatric: Normal mood and affect.       Assessment/Plan:    Hypertension  Well controlled today    Not currently on ace/arb prescription.   Would consider given T2DM dx, nephropathy       Dyslipidemia:   Last  labs done 1-2025   Continue  Atorvastatin     Obesity   Weight: 263 lb   Starting GLP1 -see Diabetes note   Declined RD visit for MNT   Discussed carb portions/goals: Aim for 30-45 gm per meal       Type 2 diabetes mellitus with hyperglycemia, without long-term current use of insulin (HCC)    Reviewed with patient health impact associated with high glucose trends and the importance of better glucose control to prevent onset /progression of DM complications.     A1C: 8.2% ( last A1C 8.1%)   Weight: 263 lb     Diabetes control is sub optimal .  Recommendations:   Consider T 1 Diabetes antibodies if trends do not improve with medication changes, given her RA diagnosis     Reviewed action, risk vs benefit, dosing, and potential side effects of GLP-1 medications.  Patient denies hx of pancreatitis, gastroparesis, or personal or family hx of medullary thyroid CA or MEN 2. Agreeable to start therapy:   Ozempic: 0.25mg once weekly Subcutaneous x 4 weeks then increase Ozempic dose to 0.5mg once weekly if NO GI side effects  Reviewed the mechanism of action with GLP-1 Vick- and how they target 6 of the 8 deficits associated  w T2DM     Enhancing insulin secretion in glucose dependent state (no risk of hypoglycemia)   2/3. Inhibit glucagon secretion which results in  reduction in hepatic glucose production   4. Corrects the gastrointestinal incretin defect   5. Exert a central effect on the brain to suppress appetite~ which promotes weight loss  6. Weight loss enhances both muscle and hepatic sensitivity to insulin      For now will continue   Farxiga 10mg once daily   Metformin XR 500mg: 3 tab daily     If cost concern, and + response w GLP, would stop Farxiga since ASCVD, T2DM and CKD benefit       Reviewed w patient pathophysiology of diabetes, clinical significance of A1c, adverse effects of suboptimal glucose control, and goals of therapy   Reviewed the A1C test, what the value reflects and the goal for the patient.   Reminded pt on A1C and blood sugar targets (Fasting < 130 and post prandial <180 ) and complications associated with hyperglycemia and uncontrolled DM (on AVS)   Recommended SMBG 2- x daily   Reviewed s/s and treatment of hypoglycemia (on AVS)   Reminded to continue with lifestyle modifications since they have positive impact on diabetes/blood sugars/health (portion control, physical activity, weight loss)   Reinforced timing and adherence with medication, self-monitoring of blood glucose and routine follow up  Declined appointment w [facility]   Handouts provided on carb counting, food label reading and CARBOHYDRATES targets per meal   Will check in 3 weeks to check on GLP tolerance  Telehealth in 6 weeks and patient is asked to return in office 4 m  but recommended to contact DM clinic sooner if questions or concerns.    The patient indicates understanding of these issues and agrees to the plan.      Orders Placed This Encounter    ASSAY QUANTITATIVE, GLUCOSE     Order Specific Question:   Release to patient     Answer:   Immediate    semaglutide (OZEMPIC, 0.25 OR 0.5 MG/DOSE,) 2 MG/3ML Subcutaneous Solution Pen-injector     Sig: Inject 0.5 mg into the skin once a week.     Dispense:  9 mL     Refill:  0     Dx e11.9         Diabetes complications & risks  surveillance:   A1C/Blood pressure: as reported above   Nephropathy screenin  continue ace /arb rx.   LIPID screenin .atorvastatin rx.   Last dilated eye exam: No data recorded Exam shows retinopathy? No data recorded  Date of last PHQ-2 depression screen: No data recorded  Last diabetic foot exam: Last Foot Exam: 25      Spent 55 min obtaining patient history, evaluating patient, reviewing blood glucose trends, discussing treatment options, lifestyle modifications and completing documentation -t  The risks and benefits of my recommendations, as well as other treatment options were discussed with the patient today. questions were also answered to the best of my knowledge.     instructed patient on GLP rx  pen use, dosing w with the GLP rx pen, pen needles, site selection for subcutaneous injections, rotation and subcutaneous  injection administration.  Ozempic 0.25mg  dose was self-administered today in clinic and demonstrated good technique and understanding of above content.      Note to patient: The  Cures Act makes medical notes like these available to patients in the interest of transparency. However, be advised this is a medical document. It is intended as peer to peer communication. It is written in medical language and may contain abbreviations or verbiage that are unfamiliar. It may appear blunt or direct. Medical documents are intended to carry relevant information, facts as evident, and the clinical opinion of the practitioner.

## 2025-02-05 ENCOUNTER — OFFICE VISIT (OUTPATIENT)
Dept: ENDOCRINOLOGY CLINIC | Facility: CLINIC | Age: 75
End: 2025-02-05
Payer: COMMERCIAL

## 2025-02-05 VITALS
OXYGEN SATURATION: 98 % | HEART RATE: 88 BPM | SYSTOLIC BLOOD PRESSURE: 124 MMHG | DIASTOLIC BLOOD PRESSURE: 68 MMHG | WEIGHT: 263.81 LBS | RESPIRATION RATE: 17 BRPM | BODY MASS INDEX: 49 KG/M2

## 2025-02-05 DIAGNOSIS — E66.01 MORBID OBESITY (HCC): ICD-10-CM

## 2025-02-05 DIAGNOSIS — E11.65 TYPE 2 DIABETES MELLITUS WITH HYPERGLYCEMIA, WITHOUT LONG-TERM CURRENT USE OF INSULIN (HCC): Primary | ICD-10-CM

## 2025-02-05 DIAGNOSIS — I10 PRIMARY HYPERTENSION: ICD-10-CM

## 2025-02-05 DIAGNOSIS — E78.5 DYSLIPIDEMIA: ICD-10-CM

## 2025-02-05 LAB
GLUCOSE BLOOD: 215
TEST STRIP LOT #: NORMAL NUMERIC

## 2025-02-05 RX ORDER — SEMAGLUTIDE 0.68 MG/ML
0.5 INJECTION, SOLUTION SUBCUTANEOUS WEEKLY
Qty: 9 ML | Refills: 0 | Status: SHIPPED | OUTPATIENT
Start: 2025-02-05

## 2025-02-05 RX ORDER — SEMAGLUTIDE 0.68 MG/ML
INJECTION, SOLUTION SUBCUTANEOUS
Qty: 3 ML | Refills: 0 | COMMUNITY
Start: 2025-02-05

## 2025-02-05 NOTE — PATIENT INSTRUCTIONS
We are here to help you manage your diabetes. Please continue with your primary care physician/provider for your routine health care maintenance   Your A1C: 8.2%   This is  too high for you and I am happy to  work together with you with improving your health.   The A1C:  The A1C test provides us with your average blood sugar for the past 3 months. Keeping an A1C less than 7% helps reduce or delay health problems that are related to diabetes.   The main goal of diabetes treatment is to keep your sugar from going too high to prevent or delay complications from the disease as well as maintain your quality of life.   The target for A1C is less than 7.%   based on age, health history and other factors.     MEDICATIONS:   Changes today --> start Ozempic (see below)   Continue   Farxiga 10mg once daily   Metformin  XR 500mg; 3 tabs daily         Start Ozempic 0.25mg once weekly for four weeks.   After the 4th week, increase the dose to 0.5mg once weekly  if tolerating    The most common side effects of GLP1 medications such as Ozempic:  is nausea and GI upset.   Here is some advice to reduce these side effects:  - if you're having nausea and ate a large meal before the nausea, the volume of food may be too much for your stomach to handle, reduce meal size  - practice mindfulness to stop eating once full  - avoid eating when not hungry  - avoid high-fat or spicy food (particularly during the initial dose-escalation period)  - moderate your intake of alcohol and fizzy drinks (particularly if having nausea and heartburn or belching)  - If you have constipation, make sure you are drinking enough water and eating enough fiber  - if you notice some nausea symptoms and haven't eaten in a while, make sure you are eating at least three meals per day, can try snacking on high protein snack item like yogurt, string cheese, cottage cheese, protein shake  - If you have made these changes and you still notice nausea, please contact  me             Ozempic Is considered a Non insulin injectables (also called a \"gut hormone\" or GLP-1 receptor agonists).     This hormone is very good for diabetes and improving blood sugars without a risk of low blood sugars. It also has properties that is good for protecting the heart, brain and kidneys.     The downside is cost with Medicare plans --> I sent prescription to your pharmacy to check on cost but hold off on picking up the medication to insure you tolerate the medication         The  medicine  works in the following ways:   1.            By helping beta cells release more insulin when there is food in the stomach and intestines. The increased insulin lowers blood sugars.  2.            By stopping the liver from releasing sugar in to the blood when it is NOT needed.  3.            By slowing the movement of food through the stomach so that glucose (or sugar) enters the blood more slowly   4.            By making you feel full which helps decrease the amount of food that you eat.     Common side effects:   Nausea or diarrhea   These effects usually go away over time as your body gets used to the medicine     Here are some things that might help your nausea go away:     Eat small amounts of food instead of  large meals  Eat plain, bland non greasy foods such as bread, crackers and rice , non seasoned lean proteins such as chicken or fish   Drink plenty of fluids (sugar free)   Avoid foods and smells that might make you sick   Avoid greasy fried or sweet foods.   Avoid lying down after you eat. Wait at least 2 hours   Eat slowly and listen to your hunger        Blood sugar targets:  Before breakfast:   (preferably less than 110)  2 hours After meals: less than 180 (preferably less than 150)   Please call me if you are having blood sugars less than 75 or more than 200 more than 2 days in a week time span.     Diabetes health monitoring      1. LABS: It is important to monitor your kidney function  (blood and urine protein levels) , liver function tests and cholesterol levels when you have diabetes. If your primary care provider has not ordered these tests, I will order them for you.   2. FOOT exams:  daily foot inspections for foot wounds or skin changes are important for foot care. Any unusual changes should be reported immediately.  3. EYE exams: Checking your eyes for diabetes changes is important and you should have a dilated eye exam done by an eye doctor EVERY year since these changes occur in the BACK of the eye and not visible by you.     Office protocols:   My Chart Messages - Our goal is to respond to all My Chart messages within 2 business days of receipt. Messages received after 4 pm on Friday, will be addressed on Monday, except if holiday.   Please DO NOT send urgent messages through My Chart as these messages are not monitored after hours.       Patient Calls -We make every attempt to answer all patient calls within 1 business day. Calls that come in after 4pm daily will be addressed the following business day. Nurses are available to answer your calls Monday - Friday from 8am - 4 pm except for holidays.      Refill policies:     Refills may be delayed if you have not had a recent office visit or recent labs as requested by your prescriber.       Our goal is to process your refill within 3  business day of receiving the refill request.   Contact your pharmacy at least 5 days prior to running out of medication and have them send an electronic request or submit a refill through My chart: select  “request refill” option in your [facility] account.  Refills are not addressed after hours or on weekends; covering providers  do not authorize routine medications on weekends.  Refills  received after 4pm on Fridays will be addressed on Mondays.    If  your prescription is due for a refill, and you are due for a follow up appointment., this will  may impact the ability for you to get a 90 supply if  requested.   Yearly blood tests are  required for many medications to insure safe prescribing. If you are due for labs, you will have 30 days to complete the  requested labs before future refills are authorized.   In the event that your preferred pharmacy does not have the requested medication in stock (e.g. Backordered), it is your responsibility to find another pharmacy that has the requested medication available.  We will gladly send a new prescription to that pharmacy at your request.  To best provide you care, patients receiving routine medications need to be seen at least twice per year. However if the A1C is above 8% you will be need to be seen more frequently as recommended by provider and this will also impact your ability get obtain refills          Thank you   Eugenie Yu   [phone]

## 2025-02-18 DIAGNOSIS — M05.742 RHEUMATOID ARTHRITIS INVOLVING BOTH HANDS WITH POSITIVE RHEUMATOID FACTOR (HCC): ICD-10-CM

## 2025-02-18 DIAGNOSIS — E11.65 TYPE 2 DIABETES MELLITUS WITH HYPERGLYCEMIA, WITHOUT LONG-TERM CURRENT USE OF INSULIN (HCC): ICD-10-CM

## 2025-02-18 DIAGNOSIS — M19.071 OSTEOARTHRITIS OF RIGHT ANKLE, UNSPECIFIED OSTEOARTHRITIS TYPE: ICD-10-CM

## 2025-02-18 DIAGNOSIS — M05.741 RHEUMATOID ARTHRITIS INVOLVING BOTH HANDS WITH POSITIVE RHEUMATOID FACTOR (HCC): ICD-10-CM

## 2025-02-18 RX ORDER — LANCETS
1 EACH MISCELLANEOUS 2 TIMES DAILY
Qty: 200 EACH | Refills: 1 | Status: SHIPPED | OUTPATIENT
Start: 2025-02-18

## 2025-02-18 RX ORDER — HYDROCODONE BITARTRATE AND ACETAMINOPHEN 5; 325 MG/1; MG/1
1 TABLET ORAL EVERY 6 HOURS PRN
Qty: 120 TABLET | Refills: 0 | Status: SHIPPED | OUTPATIENT
Start: 2025-02-18

## 2025-02-25 ENCOUNTER — ANTI-COAG (OUTPATIENT)
Dept: CARDIOLOGY | Age: 75
End: 2025-02-25

## 2025-02-25 DIAGNOSIS — I74.3 EMBOLISM AND THROMBOSIS OF ARTERIES OF LOWER EXTREMITY  (CMD): Primary | ICD-10-CM

## 2025-02-25 DIAGNOSIS — Z79.01 LONG TERM (CURRENT) USE OF ANTICOAGULANTS: ICD-10-CM

## 2025-02-26 ENCOUNTER — PATIENT MESSAGE (OUTPATIENT)
Dept: ENDOCRINOLOGY CLINIC | Facility: CLINIC | Age: 75
End: 2025-02-26

## 2025-03-04 ENCOUNTER — ANTI-COAG (OUTPATIENT)
Dept: CARDIOLOGY | Age: 75
End: 2025-03-04

## 2025-03-04 DIAGNOSIS — Z79.01 LONG TERM (CURRENT) USE OF ANTICOAGULANTS: ICD-10-CM

## 2025-03-04 DIAGNOSIS — I74.3 EMBOLISM AND THROMBOSIS OF ARTERIES OF LOWER EXTREMITY  (CMD): Primary | ICD-10-CM

## 2025-03-11 ENCOUNTER — ANTI-COAG (OUTPATIENT)
Dept: CARDIOLOGY | Age: 75
End: 2025-03-11

## 2025-03-11 DIAGNOSIS — Z79.01 LONG TERM (CURRENT) USE OF ANTICOAGULANTS: ICD-10-CM

## 2025-03-11 DIAGNOSIS — I74.3 EMBOLISM AND THROMBOSIS OF ARTERIES OF LOWER EXTREMITY  (CMD): Primary | ICD-10-CM

## 2025-03-12 ENCOUNTER — TELEPHONE (OUTPATIENT)
Dept: ENDOCRINOLOGY CLINIC | Facility: CLINIC | Age: 75
End: 2025-03-12

## 2025-03-12 DIAGNOSIS — E11.65 TYPE 2 DIABETES MELLITUS WITH HYPERGLYCEMIA, WITHOUT LONG-TERM CURRENT USE OF INSULIN (HCC): Primary | ICD-10-CM

## 2025-03-12 NOTE — TELEPHONE ENCOUNTER
Orders Placed This Encounter    Hemoglobin A1C     Standing Status:   Future     Standing Expiration Date:   3/12/2026     Order Specific Question:   Release to patient     Answer:   Immediate

## 2025-03-12 NOTE — TELEPHONE ENCOUNTER
Future Appointments   Date Time Provider Department Center   3/20/2025 11:45 AM Eugenie Yu APRN [facility]   6/3/2025  2:30 PM Eugenie Yu APRN [facility]     Last A1c value was 8.2% done 1/27/2025.  Sending telehealth reminder with getting labs done A1C high last times pended A1C please advise if ok for patient to get done will send MCM

## 2025-03-14 ENCOUNTER — NURSE ONLY (OUTPATIENT)
Dept: LAB | Age: 75
End: 2025-03-14
Attending: INTERNAL MEDICINE
Payer: COMMERCIAL

## 2025-03-14 ENCOUNTER — LAB ENCOUNTER (OUTPATIENT)
Dept: LAB | Age: 75
End: 2025-03-14
Attending: NURSE PRACTITIONER
Payer: COMMERCIAL

## 2025-03-14 DIAGNOSIS — E11.65 TYPE 2 DIABETES MELLITUS WITH HYPERGLYCEMIA, WITHOUT LONG-TERM CURRENT USE OF INSULIN (HCC): ICD-10-CM

## 2025-03-14 DIAGNOSIS — Z79.01 LONG TERM (CURRENT) USE OF ANTICOAGULANTS: ICD-10-CM

## 2025-03-14 LAB
INR BLDC: 3.2 (ref 0.9–1.1)
INR PPP: 3.2

## 2025-03-14 PROCEDURE — 85610 PROTHROMBIN TIME: CPT

## 2025-03-14 PROCEDURE — 83036 HEMOGLOBIN GLYCOSYLATED A1C: CPT | Performed by: NURSE PRACTITIONER

## 2025-03-14 PROCEDURE — 36415 COLL VENOUS BLD VENIPUNCTURE: CPT

## 2025-03-15 LAB
EST. AVERAGE GLUCOSE BLD GHB EST-MCNC: 171 MG/DL (ref 68–126)
HBA1C MFR BLD: 7.6 % (ref ?–5.7)

## 2025-03-17 ENCOUNTER — ANTI-COAG (OUTPATIENT)
Dept: CARDIOLOGY | Age: 75
End: 2025-03-17

## 2025-03-17 DIAGNOSIS — I74.3 EMBOLISM AND THROMBOSIS OF ARTERIES OF LOWER EXTREMITY  (CMD): Primary | ICD-10-CM

## 2025-03-17 DIAGNOSIS — Z79.01 LONG TERM (CURRENT) USE OF ANTICOAGULANTS: ICD-10-CM

## 2025-03-18 DIAGNOSIS — M05.742 RHEUMATOID ARTHRITIS INVOLVING BOTH HANDS WITH POSITIVE RHEUMATOID FACTOR (HCC): ICD-10-CM

## 2025-03-18 DIAGNOSIS — M19.071 OSTEOARTHRITIS OF RIGHT ANKLE, UNSPECIFIED OSTEOARTHRITIS TYPE: ICD-10-CM

## 2025-03-18 DIAGNOSIS — M05.741 RHEUMATOID ARTHRITIS INVOLVING BOTH HANDS WITH POSITIVE RHEUMATOID FACTOR (HCC): ICD-10-CM

## 2025-03-18 RX ORDER — HYDROCODONE BITARTRATE AND ACETAMINOPHEN 5; 325 MG/1; MG/1
1 TABLET ORAL EVERY 6 HOURS PRN
Qty: 120 TABLET | Refills: 0 | Status: SHIPPED | OUTPATIENT
Start: 2025-03-18

## 2025-03-18 NOTE — TELEPHONE ENCOUNTER
Norco 5-325 mg  Filled 2-18-25  Qty 120  0 refills  Future Appointments   Date Time Provider Department Center   3/20/2025 11:45 AM Eugenie Yu APRN [facility]   6/3/2025  2:30 PM [facility]

## 2025-03-20 ENCOUNTER — TELEMEDICINE (OUTPATIENT)
Facility: CLINIC | Age: 75
End: 2025-03-20
Payer: COMMERCIAL

## 2025-03-20 DIAGNOSIS — E66.01 MORBID OBESITY (HCC): ICD-10-CM

## 2025-03-20 DIAGNOSIS — E11.65 TYPE 2 DIABETES MELLITUS WITH HYPERGLYCEMIA, WITHOUT LONG-TERM CURRENT USE OF INSULIN (HCC): Primary | ICD-10-CM

## 2025-03-20 DIAGNOSIS — I10 PRIMARY HYPERTENSION: ICD-10-CM

## 2025-03-20 DIAGNOSIS — E78.5 DYSLIPIDEMIA: ICD-10-CM

## 2025-03-20 PROCEDURE — 98005 SYNCH AUDIO-VIDEO EST LOW 20: CPT | Performed by: NURSE PRACTITIONER

## 2025-03-20 NOTE — PROGRESS NOTES
Telehealth visit: Please note that the following visit was completed using two-way, real-time interactive audio and video communication.  Time Spent:  14 min       Amee Bermudez is a 74 year old presenting today t for type 2 diabetes management.   Primary care physician: Nini Ramirez MD  Initial consult with me: 2-5-205: A1C at time of consult: 8.2%)   Started Ozempic; now up to 0.5mg subcutaneous once weekly   Initially minor diarrhea and some fatigue/dizziness.     Most recent A1C 7.6% ( last A1C  8.2% )   Trends have improved.   Feels she has lost weight; but has not weighed herself     Testing only fasting : trends   Since march 1, 2025 : average 147 mg/dl   Lowest 126 mg/dl           Diabetes History:  Pre Diabetes   T2DM since ~ 2015   Patient has not had hospitalizations for blood sugar issues  denies any history of pancreatitis.       Previous DM therapies:  none     Current DM Regimen:  Metformin ER 500mg : 3 tab daily   Ozempic 0.5mg subcutaneous once weekly   Farxiga 10mg once daily         HGBA1C:    Lab Results   Component Value Date    A1C 7.6 (H) 03/14/2025    A1C 8.2 (H) 01/27/2025    A1C 8.1 (H) 07/23/2024     (H) 03/14/2025       Lab Results   Component Value Date    CHOLEST 158 01/27/2025    CHOLEST 152 07/23/2024    TRIG 162 (H) 01/27/2025    TRIG 155 (H) 07/23/2024    HDL 53 01/27/2025    HDL 50 07/23/2024    LDL 77 01/27/2025    LDL 75 07/23/2024     Lab Results   Component Value Date    MICROALBCREA 36.6 (H) 01/29/2025    MICROALBCREA 82.3 (H) 07/24/2024      Lab Results   Component Value Date    CREATSERUM 0.84 01/27/2025    CREATSERUM 0.86 07/23/2024    EGFRCR 74 01/27/2025    EGFRCR 72 07/23/2024     Lab Results   Component Value Date    AST 22 01/27/2025    AST 21 07/23/2024    ALT 30 01/27/2025    ALT 29 07/23/2024       Lab Results   Component Value Date    TSH 1.586 01/27/2025    TSH 1.701 07/23/2024         DM Complications:  Microvascular:   Neuropathy: no  Retinopathy:  yes  Nephropathy: yes    Macrovascular:  PVD: yes  CAD No  Stroke/CVA: no    HX Probable thrombophilia: On Coumadin and being followed in Coumadin clinic , [facility]     FOREST; no CPAP       Modifying factors:  Medication adherence: yes   Barriers: none    Recent steroids, illness or infections ( past 3m): no     Allergies: Cat hair extract and Seasonal    Past Medical History:    Allergic rhinitis    Anxiety    Arthritis    Depression    Diabetes (HCC)    Hyperlipidemia    Knee osteoarthritis    Obesity    Osteoarthritis    Sleep apnea    Solitary kidney, congenital    Unspecified essential hypertension     Past Surgical History:   Procedure Laterality Date    Cataract  5yrs ago    Cholecystectomy        89    Other surgical history  ,     left femoral angiogram/angioplasty and stent    Other surgical history  age 29    right ankle fx (slipped on ice)    Tonsillectomy  67 yrs ago     Social History     Socioeconomic History    Marital status:    Tobacco Use    Smoking status: Former     Current packs/day: 0.00     Types: Cigarettes     Quit date: 1981     Years since quittin.2    Smokeless tobacco: Never   Vaping Use    Vaping status: Never Used   Substance and Sexual Activity    Alcohol use: Yes     Comment: One in a while, 1 glass of wine    Drug use: Never   Other Topics Concern    Caffeine Concern No    Stress Concern No    Weight Concern No    Special Diet No    Exercise No    Seat Belt Yes     Family History   Problem Relation Age of Onset    Other (Parkinson's disease) Father     Other (Uremic poisoning) Maternal Grandmother     Heart Disease Paternal Grandmother     Heart Attack Maternal Grandfather         AMI    Other (COPD) Mother      Current Medication List:   Current Outpatient Medications   Medication Sig Dispense Refill    HYDROcodone-acetaminophen 5-325 MG Oral Tab Take 1 tablet by mouth every 6 (six) hours as needed. 120 tablet 0    Glucose Blood (CONTOUR  NEXT TEST) In Vitro Strip Patient tests her glucose twice a day 200 strip 1    Microlet Lancets Does not apply Misc 1 lancet  by Finger stick route 2 (two) times daily. 200 each 1    semaglutide (OZEMPIC, 0.25 OR 0.5 MG/DOSE,) 2 MG/3ML Subcutaneous Solution Pen-injector Inject 0.5 mg into the skin once a week. 9 mL 0    Mirabegron ER (MYRBETRIQ) 25 MG Oral Tablet 24 Hr Take 1 tablet (25 mg total) by mouth daily. 90 tablet 0    warfarin 5 MG Oral Tab Take 1 tablet (5 mg total) by mouth daily. 90 tablet 0    atorvastatin 20 MG Oral Tab Take 1 tablet (20 mg total) by mouth daily. 90 tablet 0    metFORMIN  MG Oral Tablet 24 Hr Take 3 tablets (1,500 mg total) by mouth daily with breakfast. 270 tablet 0    FLUOXETINE HCL 40 MG Oral Cap TAKE 1 CAPSULE(40 MG) BY MOUTH DAILY 90 capsule 0    dapagliflozin (FARXIGA) 10 MG Oral Tab Take 1 tablet (10 mg total) by mouth daily. 90 tablet 0    Triamterene-HCTZ 37.5-25 MG Oral Tab Take 1 tablet by mouth daily. 90 tablet 0    nystatin 100,000 Units/g External Cream Apply 1 Application topically 2 (two) times daily. 30 g 1    felodipine ER 10 MG Oral Tablet 24 Hr Take 1 tablet (10 mg total) by mouth daily. 90 tablet 1           DM associated review of  symptoms:   Endocrine: Polyuria, polyphagia, polydipsia: no  Neurological: Paresthesias: no  HEENT: Blurred vision: no  Skin: no rash or wounds  Hematological: Hypoglycemia: no      Review of Systems     LUNGS: denies shortness of breath   CARDIOVASCULAR: denies chest pain  GI: denies abdominal pain, nausea or diarrhea   : denies dysuria or mycotic infections   MUSCULOSKELETAL: + RA/OA  pain        Physical exam:  There were no vitals taken for this visit.  There is no height or weight on file to calculate BMI.    Physical Exam     Constitutional: Normal appearance   Cardiovascular: not assessed today   Pulmonary/Chest: Effort normal  Neurological: Alert and oriented .   Psychiatric: Normal mood and affect.        Assessment/Plan:    Hypertension  Needs ongoing monitoring   Not currently on ace/arb prescription.   Would consider given T2DM dx, nephropathy       Dyslipidemia:   Last  labs done 1-2025   Needs ongoing monitoring   Continue  Atorvastatin     Nephropathy   Reminded patient impact glycemic trends have on kidney health   Lab Results   Component Value Date    EGFRCR 74 01/27/2025    MICROALBCREA 36.6 (H) 01/29/2025      Needs ongoing monitoring   Started ozempic - see Diabetes note  2025 FDA label upate: Ozempic  reduces the risk of sustained egfr decline, ESRD, and CV death in adults with type 2 diabetes and chronic kidney disease         Obesity   Last weight: 263 lb   Tolerating ozempic  GLP1 -see Diabetes note   Continue lifestyle modifications.       Type 2 diabetes mellitus with hyperglycemia, without long-term current use of insulin ([facility])  A1C 7.6% ( last A1C 8.2%)   Last Diabetes center weight: 263 lb     Diabetes control is improving but needs ongoing management and evaluation  given the chronicity of Diabetes, ongoing medication monitoring and risk for complications     Reviewed with patient health impact associated with high glucose trends and the importance of better glucose control to prevent onset /progression of DM complications  Patient prefers to hold off on Ozempic dose increase.     For now will continue   Ozempic 0.5mg subcutaneous once weekly   Farxiga 10mg once daily   Metformin XR 500mg: 3 tab daily     Reviewed clinical significance of A1c, adverse effects of suboptimal glucose control, and goals of therapy   Reviewed the A1C test, what the value reflects and the goal for the patient.   Reminded pt on A1C and blood sugar targets (Fasting < 130 and post prandial <180 ) and complications associated with hyperglycemia and uncontrolled DM (on AVS)   Recommended SMBG 1- 2- x daily   Continue with lifestyle modifications since they have positive impact on diabetes/blood sugars/health (portion  control, physical activity, weight loss)   Reinforced timing and adherence with medication, self-monitoring of blood glucose and routine follow up    Follow up 6-2025 but recommended to contact DM clinic sooner if questions or concerns.    The patient indicates understanding of these issues and agrees to the plan.      No orders of the defined types were placed in this encounter.      Diabetes complications & risks surveillance:   A1C/Blood pressure: as reported    Last dilated eye exam: No data recorded Exam shows retinopathy? No data recorded  Last diabetic foot exam: Last Foot Exam: 01/29/25  Nephropathy screening:   not currently on ace /arb rx.    Lab Results   Component Value Date    EGFRCR 74 01/27/2025    MICROALBCREA 36.6 (H) 01/29/2025     LIPID screening:    Lab Results   Component Value Date    CHOLEST 158 01/27/2025    LDL 77 01/27/2025    TRIG 162 (H) 01/27/2025    HDL 53 01/27/2025    FASTING Yes 01/27/2025    FASTING Yes 01/27/2025     Cholesterol Lowering Medications            atorvastatin 20 MG Oral Tab           Note to patient: The 21 Century Cures Act makes medical notes like these available to patients in the interest of transparency. However, be advised this is a medical document. It is intended as peer to peer communication. It is written in medical language and may contain abbreviations or verbiage that are unfamiliar. It may appear blunt or direct. Medical documents are intended to carry relevant information, facts as evident, and the clinical opinion of the practitioner.             This has been done in good mitali to provide continuity of care in the best interest of the provider-patient relationship, due to the on-going public health crisis/national emergency and because of restrictions of visitation.  There are limitations of this visit as no or only very limited physical exam could be performed.  Every conscious effort was taken to allow for sufficient and adequate time.  This billing  visit was spent on reviewing blood sugar trends, DM related labs, medications and decision making.  Appropriate medical decision-making and tests are ordered as detailed in the plan of care above.      Amee Bermudez understands phone or video evaluation is not a substitute for face-to-face examination or emergency care. Patient advised to go to ER or call 911 for worsening symptoms or acute distress.     Eugenie Yu, APRN

## 2025-03-20 NOTE — PATIENT INSTRUCTIONS
A1C: 7.6%     Continue   Metformin ER 500mg : 3 tab daily   Ozempic 0.5mg subcutaneous once weekly   Farxiga 10mg once daily         Fluctuations in blood sugars are best detected by testing your sugar with your meter.   In order for me to determine any patterns in your blood sugars, you will need to test your blood sugar 1- 2 times daily     It would be best to change up the times of day that you are testing your sugar.   Always test before breakfast (fasting) and then alternate testing blood sugar 1-2 hours after your meals.     American Diabetes Association: blood sugar targets:     Fasting blood sugar (before breakfast) Target:    (ideally less than 110)  2 hours after eating less than 180 (ideally less than  150 )     Call for blood sugars greater than  200 more than 2 times in a week

## 2025-03-27 ENCOUNTER — TELEPHONE (OUTPATIENT)
Dept: CARDIOLOGY | Age: 75
End: 2025-03-27

## 2025-03-27 DIAGNOSIS — I74.3 EMBOLISM AND THROMBOSIS OF ARTERIES OF LOWER EXTREMITY  (CMD): Primary | ICD-10-CM

## 2025-03-27 DIAGNOSIS — Z79.01 LONG TERM (CURRENT) USE OF ANTICOAGULANTS: ICD-10-CM

## 2025-03-27 DIAGNOSIS — I73.9 PAD (PERIPHERAL ARTERY DISEASE) (CMD): Primary | ICD-10-CM

## 2025-03-28 ENCOUNTER — ANTI-COAG (OUTPATIENT)
Dept: CARDIOLOGY | Age: 75
End: 2025-03-28

## 2025-03-28 DIAGNOSIS — I74.3 EMBOLISM AND THROMBOSIS OF ARTERIES OF LOWER EXTREMITY  (CMD): Primary | ICD-10-CM

## 2025-03-28 DIAGNOSIS — Z79.01 LONG TERM (CURRENT) USE OF ANTICOAGULANTS: ICD-10-CM

## 2025-03-31 ENCOUNTER — ANTI-COAG (OUTPATIENT)
Dept: CARDIOLOGY | Age: 75
End: 2025-03-31

## 2025-03-31 DIAGNOSIS — Z79.01 LONG TERM (CURRENT) USE OF ANTICOAGULANTS: ICD-10-CM

## 2025-03-31 DIAGNOSIS — I74.3 EMBOLISM AND THROMBOSIS OF ARTERIES OF LOWER EXTREMITY  (CMD): Primary | ICD-10-CM

## 2025-04-06 DIAGNOSIS — I10 ESSENTIAL HYPERTENSION: ICD-10-CM

## 2025-04-06 DIAGNOSIS — E11.9 DIABETES MELLITUS WITHOUT COMPLICATION (HCC): ICD-10-CM

## 2025-04-06 DIAGNOSIS — F32.A DEPRESSION, UNSPECIFIED DEPRESSION TYPE: ICD-10-CM

## 2025-04-07 RX ORDER — FLUOXETINE HYDROCHLORIDE 40 MG/1
40 CAPSULE ORAL DAILY
Qty: 90 CAPSULE | Refills: 0 | Status: SHIPPED | OUTPATIENT
Start: 2025-04-07

## 2025-04-07 RX ORDER — SEMAGLUTIDE 0.68 MG/ML
0.5 INJECTION, SOLUTION SUBCUTANEOUS WEEKLY
Qty: 9 ML | Refills: 0 | Status: SHIPPED | OUTPATIENT
Start: 2025-04-07

## 2025-04-07 RX ORDER — DAPAGLIFLOZIN 10 MG/1
10 TABLET, FILM COATED ORAL DAILY
Qty: 90 TABLET | Refills: 0 | Status: SHIPPED | OUTPATIENT
Start: 2025-04-07

## 2025-04-07 RX ORDER — TRIAMTERENE AND HYDROCHLOROTHIAZIDE 37.5; 25 MG/1; MG/1
1 TABLET ORAL DAILY
Qty: 90 TABLET | Refills: 0 | Status: SHIPPED | OUTPATIENT
Start: 2025-04-07

## 2025-04-07 RX ORDER — FELODIPINE 10 MG/1
10 TABLET, EXTENDED RELEASE ORAL DAILY
Qty: 90 TABLET | Refills: 0 | Status: SHIPPED | OUTPATIENT
Start: 2025-04-07

## 2025-04-07 RX ORDER — METFORMIN HYDROCHLORIDE 500 MG/1
1500 TABLET, EXTENDED RELEASE ORAL
Qty: 270 TABLET | Refills: 0 | Status: SHIPPED | OUTPATIENT
Start: 2025-04-07

## 2025-04-07 NOTE — TELEPHONE ENCOUNTER
Farxiga 10 mg    Diabetes Medication Protocol [id number]/06/2025 02:56 PM   Protocol Details Last A1C < 7.5 and within past 6 months      Filled 12-30-24  Qty 90  0 refills  Future Appointments   Date Time Provider Department Center   6/3/2025  2:30 PM Eugenie Yu APRN [facility]   LOV 1-29-25 [facility]  Labs 3-14-25 A1c    Metformin  mg    Diabetes Medication Protocol [id number]/06/2025 02:56 PM   Protocol Details Last A1C < 7.5 and within past 6 months      Filled 1-17-25  Qty 270  0 refills  Future Appointments   Date Time Provider Department Center   6/3/2025  2:30 PM Eugenie Yu APRN [facility]   LOV 1-29-25 [facility]  Labs 3-14-25 A1c    Felodipine ER 10 mg  Filled 10-7-24  Qty 90  1 refill  Future Appointments   Date Time Provider Department Center   6/3/2025  2:30 PM Eugenie Yu APRN [facility]   LOV 1-29-25 [facility]    Fluoxetine 40 mg  Filled 1-6-25  Qty 90  0 refills  Future Appointments   Date Time Provider Department Center   6/3/2025  2:30 PM Eugenie Yu APRN [facility]   LOV 1-29-25 [facility]    Triamterene-hydrochlorothiazide 37.5-25 mg  Filled 12-30-24  Qty 90  0 refills  Future Appointments   Date Time Provider Department Center   6/3/2025  2:30 PM Eugenie Yu APRN [facility]   LOV 1-29-25 [facility]

## 2025-04-07 NOTE — TELEPHONE ENCOUNTER
Requested Prescriptions     Pending Prescriptions Disp Refills    semaglutide (OZEMPIC, 0.25 OR 0.5 MG/DOSE,) 2 MG/3ML Subcutaneous Solution Pen-injector 9 mL 0     Sig: Inject 0.5 mg into the skin once a week.     Future Appointments   Date Time Provider Department Center   6/3/2025  2:30 PM Eugenie Yu APRN [facility]     Last A1c value was 7.6% done 3/14/2025.  Refill 02/05/25 [facility]   LOV 03/20/25 [facility]     Patient was only given 30 day Rx at least script still has 6 mL left Rx not needed will call pharmacy to fill script      Dispensed Written Strength Quantity Refills Days Supply Provider Pharmacy    OZEMPIC 0.25 OR 0.5MG DOS(2MG/3ML) 02/05/2025 02/05/2025  3 mL  28 [facility]       LVM for pharmacy to run 6mL script left on file and left office number if they have questions

## 2025-04-09 ENCOUNTER — ANTI-COAG (OUTPATIENT)
Dept: CARDIOLOGY | Age: 75
End: 2025-04-09

## 2025-04-09 DIAGNOSIS — I74.3 EMBOLISM AND THROMBOSIS OF ARTERIES OF LOWER EXTREMITY  (CMD): Primary | ICD-10-CM

## 2025-04-09 DIAGNOSIS — Z79.01 LONG TERM (CURRENT) USE OF ANTICOAGULANTS: ICD-10-CM

## 2025-04-16 ENCOUNTER — ANTI-COAG (OUTPATIENT)
Dept: CARDIOLOGY | Age: 75
End: 2025-04-16

## 2025-04-16 DIAGNOSIS — I74.3 EMBOLISM AND THROMBOSIS OF ARTERIES OF LOWER EXTREMITY  (CMD): Primary | ICD-10-CM

## 2025-04-16 DIAGNOSIS — Z79.01 LONG TERM (CURRENT) USE OF ANTICOAGULANTS: ICD-10-CM

## 2025-04-17 ENCOUNTER — TELEPHONE (OUTPATIENT)
Dept: CARDIOLOGY | Age: 75
End: 2025-04-17

## 2025-04-19 ENCOUNTER — NURSE TRIAGE (OUTPATIENT)
Dept: TELEHEALTH | Age: 75
End: 2025-04-19

## 2025-04-19 ENCOUNTER — NURSE ONLY (OUTPATIENT)
Dept: LAB | Age: 75
End: 2025-04-19
Attending: INTERNAL MEDICINE
Payer: COMMERCIAL

## 2025-04-19 DIAGNOSIS — Z79.01 LONG TERM (CURRENT) USE OF ANTICOAGULANTS: ICD-10-CM

## 2025-04-19 DIAGNOSIS — I74.3 EMBOLISM AND THROMBOSIS OF ARTERIES OF LOWER EXTREMITY  (CMD): Primary | ICD-10-CM

## 2025-04-19 DIAGNOSIS — M05.741 RHEUMATOID ARTHRITIS INVOLVING BOTH HANDS WITH POSITIVE RHEUMATOID FACTOR (HCC): ICD-10-CM

## 2025-04-19 DIAGNOSIS — M05.742 RHEUMATOID ARTHRITIS INVOLVING BOTH HANDS WITH POSITIVE RHEUMATOID FACTOR (HCC): ICD-10-CM

## 2025-04-19 DIAGNOSIS — M19.071 OSTEOARTHRITIS OF RIGHT ANKLE, UNSPECIFIED OSTEOARTHRITIS TYPE: ICD-10-CM

## 2025-04-19 LAB
INR BLD: 2.88 (ref 0.8–1.2)
INR BLDC: 3.7 (ref 0.9–1.1)
INR PPP: 2.88
PROTHROMBIN TIME: 31.5 SECONDS (ref 11.6–14.8)

## 2025-04-19 PROCEDURE — 36415 COLL VENOUS BLD VENIPUNCTURE: CPT

## 2025-04-19 PROCEDURE — 85610 PROTHROMBIN TIME: CPT

## 2025-04-19 RX ORDER — HYDROCODONE BITARTRATE AND ACETAMINOPHEN 5; 325 MG/1; MG/1
1 TABLET ORAL EVERY 6 HOURS PRN
Qty: 120 TABLET | Refills: 0 | Status: SHIPPED | OUTPATIENT
Start: 2025-04-19

## 2025-04-21 ENCOUNTER — ANTI-COAG (OUTPATIENT)
Dept: CARDIOLOGY | Age: 75
End: 2025-04-21

## 2025-04-21 DIAGNOSIS — Z79.01 LONG TERM (CURRENT) USE OF ANTICOAGULANTS: Primary | ICD-10-CM

## 2025-04-21 DIAGNOSIS — I73.9 PAD (PERIPHERAL ARTERY DISEASE) (CMD): ICD-10-CM

## 2025-04-21 DIAGNOSIS — Z79.01 LONG TERM (CURRENT) USE OF ANTICOAGULANTS: ICD-10-CM

## 2025-04-21 DIAGNOSIS — I74.3 EMBOLISM AND THROMBOSIS OF ARTERIES OF LOWER EXTREMITY  (CMD): Primary | ICD-10-CM

## 2025-05-04 DIAGNOSIS — I74.3 EMBOLISM AND THROMBOSIS OF ARTERIES OF LOWER EXTREMITIES (HCC): ICD-10-CM

## 2025-05-04 DIAGNOSIS — N39.3 STRESS INCONTINENCE, FEMALE: ICD-10-CM

## 2025-05-04 DIAGNOSIS — E78.2 MIXED HYPERLIPIDEMIA: ICD-10-CM

## 2025-05-04 RX ORDER — MIRABEGRON 25 MG/1
25 TABLET, FILM COATED, EXTENDED RELEASE ORAL DAILY
Qty: 90 TABLET | Refills: 0 | Status: SHIPPED | OUTPATIENT
Start: 2025-05-04

## 2025-05-04 RX ORDER — ATORVASTATIN CALCIUM 20 MG/1
20 TABLET, FILM COATED ORAL DAILY
Qty: 90 TABLET | Refills: 0 | Status: SHIPPED | OUTPATIENT
Start: 2025-05-04

## 2025-05-04 RX ORDER — WARFARIN SODIUM 5 MG/1
5 TABLET ORAL DAILY
Qty: 90 TABLET | Refills: 0 | Status: SHIPPED | OUTPATIENT
Start: 2025-05-04

## 2025-05-05 RX ORDER — SEMAGLUTIDE 0.68 MG/ML
0.5 INJECTION, SOLUTION SUBCUTANEOUS WEEKLY
Qty: 9 ML | Refills: 0 | Status: SHIPPED | OUTPATIENT
Start: 2025-05-05

## 2025-05-05 NOTE — TELEPHONE ENCOUNTER
Requested Prescriptions     Pending Prescriptions Disp Refills    semaglutide (OZEMPIC, 0.25 OR 0.5 MG/DOSE,) 2 MG/3ML Subcutaneous Solution Pen-injector 9 mL 0     Sig: Inject 0.5 mg into the skin once a week.     Future Appointments   Date Time Provider Department Center   6/3/2025  2:30 PM Eugenie Yu, IVETTE [facility]     Last A1c value was 7.6% done 3/14/2025.  [id number]/07/25 [facility]   LOV 02/05/25 [facility]

## 2025-05-18 DIAGNOSIS — M05.741 RHEUMATOID ARTHRITIS INVOLVING BOTH HANDS WITH POSITIVE RHEUMATOID FACTOR (HCC): ICD-10-CM

## 2025-05-18 DIAGNOSIS — M05.742 RHEUMATOID ARTHRITIS INVOLVING BOTH HANDS WITH POSITIVE RHEUMATOID FACTOR (HCC): ICD-10-CM

## 2025-05-18 DIAGNOSIS — M19.071 OSTEOARTHRITIS OF RIGHT ANKLE, UNSPECIFIED OSTEOARTHRITIS TYPE: ICD-10-CM

## 2025-05-19 RX ORDER — HYDROCODONE BITARTRATE AND ACETAMINOPHEN 5; 325 MG/1; MG/1
1 TABLET ORAL EVERY 6 HOURS PRN
Qty: 120 TABLET | Refills: 0 | Status: SHIPPED | OUTPATIENT
Start: 2025-05-19

## 2025-05-19 NOTE — TELEPHONE ENCOUNTER
HYDROcodone-acetaminophen 5-325 MG Oral Tab          Sig: Take 1 tablet by mouth every 6 (six) hours as needed.    Disp: 120 tablet    Refills: 0    Start: 5/18/2025    Earliest Fill Date: 5/18/2025    Class: Normal    Non-formulary For: Rheumatoid arthritis involving both hands with positive rheumatoid factor (HCC); Osteoarthritis of right ankle, unspecified osteoarthritis type    Last ordered: 1 month ago (4/19/2025) by IVETTE Zepeda    Controlled Substance Medication [id number]/18/2025 04:26 PM    This medication is a controlled substance - forward to provider to refill    Medication is active on med list      LOV 1/29/25  Filled 4/19/2025 120 tabs 0 refills   Future Appointments   Date Time Provider Department Center   6/3/2025  2:30 PM Eugenie Yu APRN [facility]

## 2025-05-20 ENCOUNTER — ANTI-COAG (OUTPATIENT)
Dept: CARDIOLOGY | Age: 75
End: 2025-05-20

## 2025-05-20 DIAGNOSIS — I74.3 EMBOLISM AND THROMBOSIS OF ARTERIES OF LOWER EXTREMITY  (CMD): Primary | ICD-10-CM

## 2025-05-20 DIAGNOSIS — Z79.01 LONG TERM (CURRENT) USE OF ANTICOAGULANTS: ICD-10-CM

## 2025-05-28 ENCOUNTER — ANTI-COAG (OUTPATIENT)
Dept: CARDIOLOGY | Age: 75
End: 2025-05-28

## 2025-05-28 DIAGNOSIS — I74.3 EMBOLISM AND THROMBOSIS OF ARTERIES OF LOWER EXTREMITY  (CMD): Primary | ICD-10-CM

## 2025-05-28 DIAGNOSIS — Z79.01 LONG TERM (CURRENT) USE OF ANTICOAGULANTS: ICD-10-CM

## 2025-06-02 NOTE — PROGRESS NOTES
Chief Complaint   Patient presents with    Diabetes     Follow up brought meter       Amee Bermudez is a 75 year old presenting today t for type 2 diabetes management.   Primary care physician: Nini Ramirez MD/Galina Almonte NP   Initial consult with me: 2-5-205: A1C at time of consult: 8.2%)   Last Diabetes appointment with me 3-2025; tolerating ozempic   Has lost 13 lb since starting ozempic  Transient diarrhea ~ 2 d/week but admits she has \"always had issues w diarrhea\"   Food, stress most common triggers.   Does not feel Ozempic increased symptoms.      Today's A1C 6.2 ( last A1C 7.6%)     Paying a lot for her test strips. Has brochure for [employer] but has not looked into the program       Testing  1-2 x dialy    Average: 142 mg/dl   Range 115- 180   mg/dl           Diabetes History:  Pre Diabetes   T2DM since ~ 2015   Patient has not had hospitalizations for blood sugar issues  denies any history of pancreatitis.       Previous DM therapies:  none     Current DM Regimen:  Metformin ER 500mg : 3 tab daily   Ozempic 0.5mg subcutaneous once weekly   Farxiga 10mg once daily         HGBA1C:    Lab Results   Component Value Date    A1C 6.2 (A) 06/03/2025    A1C 7.6 (H) 03/14/2025    A1C 8.2 (H) 01/27/2025     (H) 03/14/2025       Lab Results   Component Value Date    CHOLEST 158 01/27/2025    CHOLEST 152 07/23/2024    TRIG 162 (H) 01/27/2025    TRIG 155 (H) 07/23/2024    HDL 53 01/27/2025    HDL 50 07/23/2024    LDL 77 01/27/2025    LDL 75 07/23/2024     Lab Results   Component Value Date    MICROALBCREA 36.6 (H) 01/29/2025    MICROALBCREA 82.3 (H) 07/24/2024      Lab Results   Component Value Date    CREATSERUM 0.84 01/27/2025    CREATSERUM 0.86 07/23/2024    EGFRCR 74 01/27/2025    EGFRCR 72 07/23/2024     Lab Results   Component Value Date    AST 22 01/27/2025    AST 21 07/23/2024    ALT 30 01/27/2025    ALT 29 07/23/2024       Lab Results   Component Value Date    TSH 1.586 01/27/2025    TSH 1.701 07/23/2024          DM Complications:  Microvascular:   Neuropathy: no  Retinopathy: yes  Nephropathy: yes    Macrovascular:  PVD: yes  CAD No  Stroke/CVA: no    HX Probable thrombophilia: On Coumadin and being followed in Coumadin clinic , [facility]     FOREST; no CPAP       Modifying factors:  Medication adherence: yes   Recent steroids, illness or infections ( past 3m): no     Allergies: Cat hair extract and Seasonal    Past Medical History:    Allergic rhinitis    Anxiety    Arthritis    Depression    Diabetes (HCC)    Hyperlipidemia    Knee osteoarthritis    Obesity    Osteoarthritis    Sleep apnea    Solitary kidney, congenital    Unspecified essential hypertension     Past Surgical History:   Procedure Laterality Date    Cataract  5yrs ago    Cholecystectomy        89    Other surgical history  ,     left femoral angiogram/angioplasty and stent    Other surgical history  age 29    right ankle fx (slipped on ice)    Tonsillectomy  67 yrs ago     Social History     Socioeconomic History    Marital status:    Tobacco Use    Smoking status: Former     Current packs/day: 0.00     Types: Cigarettes     Quit date: 1981     Years since quittin.4    Smokeless tobacco: Never   Vaping Use    Vaping status: Never Used   Substance and Sexual Activity    Alcohol use: Yes     Comment: One in a while, 1 glass of wine    Drug use: Never   Other Topics Concern    Caffeine Concern No    Stress Concern No    Weight Concern No    Special Diet No    Exercise No    Seat Belt Yes     Family History   Problem Relation Age of Onset    Other (Parkinson's disease) Father     Other (Uremic poisoning) Maternal Grandmother     Heart Disease Paternal Grandmother     Heart Attack Maternal Grandfather         AMI    Other (COPD) Mother      Current Medication List:   Current Outpatient Medications   Medication Sig Dispense Refill    semaglutide 4 MG/3ML Subcutaneous Solution Pen-injector Inject 1 mg into the skin  once a week. 3 mL 3    metFORMIN  MG Oral Tablet 24 Hr 2 tab daily      warfarin 5 MG Oral Tab Take 1 tablet (5 mg total) by mouth daily. 90 tablet 0    atorvastatin 20 MG Oral Tab Take 1 tablet (20 mg total) by mouth daily. 90 tablet 0    felodipine ER 10 MG Oral Tablet 24 Hr Take 1 tablet (10 mg total) by mouth daily. 90 tablet 0    dapagliflozin (FARXIGA) 10 MG Oral Tab Take 1 tablet (10 mg total) by mouth daily. 90 tablet 0    FLUoxetine HCl 40 MG Oral Cap Take 1 capsule (40 mg total) by mouth daily. 90 capsule 0    HYDROcodone-acetaminophen 5-325 MG Oral Tab Take 1 tablet by mouth every 6 (six) hours as needed. 120 tablet 0    Mirabegron ER (MYRBETRIQ) 25 MG Oral Tablet 24 Hr Take 1 tablet (25 mg total) by mouth daily. 90 tablet 0    Triamterene-HCTZ 37.5-25 MG Oral Tab Take 1 tablet by mouth daily. 90 tablet 0    Glucose Blood (CONTOUR NEXT TEST) In Vitro Strip Patient tests her glucose twice a day 200 strip 1    Microlet Lancets Does not apply Misc 1 lancet  by Finger stick route 2 (two) times daily. 200 each 1    nystatin 100,000 Units/g External Cream Apply 1 Application topically 2 (two) times daily. 30 g 1           DM associated review of  symptoms:   Endocrine: Polyuria, polyphagia, polydipsia: no  Neurological: Paresthesias: no  HEENT: Blurred vision: no  Skin: no rash or wounds  Hematological: Hypoglycemia: no      Review of Systems     LUNGS: denies shortness of breath   CARDIOVASCULAR: denies chest pain  GI: denies abdominal pain, nausea. + transient  diarrhea   : denies dysuria or mycotic infections   MUSCULOSKELETAL: + RA/OA  pain        Physical exam:  /62   Pulse 93   Resp 17   Wt 250 lb 3.2 oz (113.5 kg)   SpO2 94%   BMI 46.51 kg/m²   Body mass index is 46.51 kg/m².    Physical Exam     Constitutional: Normal appearance   Cardiovascular: normal rate    Pulmonary/Chest: Effort normal  Neurological: Alert and oriented .   Psychiatric: Normal mood and affect.        Assessment/Plan:    Hypertension  Needs ongoing monitoring   Not currently on ace/arb prescription.   Would consider given T2DM dx, nephropathy --. Defer to PCP       Dyslipidemia   Cholesterol: 158, done on 1/27/2025.  HDL Cholesterol: 53, done on 1/27/2025.  TriGlycerides 162, done on 1/27/2025.  LDL Cholesterol: 77, done on 1/27/2025.    Triglycerides should improve as Diabetes trends improve   Needs ongoing monitoring   Continue  Atorvastatin     Nephropathy   Reminded patient impact glycemic trends have on kidney health   Lab Results   Component Value Date    EGFRCR 74 01/27/2025    MICROALBCREA 36.6 (H) 01/29/2025    Repeat lab in    Needs ongoing monitoring   Started ozempic - see Diabetes note  2025 FDA label upate: Ozempic  reduces the risk of sustained egfr decline, ESRD, and CV death in adults with type 2 diabetes and chronic kidney disease     VIT D def  Update level per PCP     Obesity   Weight 250 lb ( Last weight: 263 lb )  ]weight is improving    Tolerating ozempic  GLP1 ---> increase dose. See Diabetes note   Continue lifestyle modifications.       Type 2 diabetes mellitus with hyperglycemia, without long-term current use of insulin ([facility])  A1C 6.2% ( last A1C  7.6 %)   Weight: 250 lb ( Last Diabetes center weight: 263 lb )     Diabetes control is improving but needs ongoing management and evaluation  given the chronicity of Diabetes, ongoing medication monitoring and risk for complications     Reviewed with patient health impact associated with high glucose trends and the importance of better glucose control to prevent onset /progression of DM complications    Increase Ozempic 0.5mg--> 1.0 mg  subcutaneous once weekly   Decrease Metformin XR 500mg: 3 tab daily --> 1 tab daily however she was reluctant to such a dose decrease; agreeable to decrease 3--> 2 tab daily     Continue   Farxiga 10mg once daily       Reviewed clinical significance of A1c, adverse effects of suboptimal glucose  control, and goals of therapy   Reviewed the A1C test, what the value reflects and the goal for the patient.   Reminded pt on A1C and blood sugar targets (Fasting < 130 and post prandial <180 ) and complications associated with hyperglycemia and uncontrolled DM (on AVS)   Recommended SMBG 1- 2- x daily   Continue with lifestyle modifications since they have positive impact on diabetes/blood sugars/health (portion control, physical activity, weight loss)   Reinforced timing and adherence with medication, self-monitoring of blood glucose and routine follow up  Discussed w PCP APN about upcoming labs needed for appointment - ordered.   Follow up 3 - 4m  but recommended to contact DM clinic sooner if questions or concerns.    The patient indicates understanding of these issues and agrees to the plan.      Orders Placed This Encounter    POC Hemoglobin A1C     Release to patient:   Immediate    Microalb/Creat Ratio, Random Urine     Standing Status:   Future     Expected Date:   9/3/2025     Expiration Date:   6/3/2026    Comp Metabolic Panel (14)     Standing Status:   Future     Expected Date:   9/3/2025     Expiration Date:   6/3/2026    Lipid Panel     Standing Status:   Future     Expected Date:   9/3/2025     Expiration Date:   6/3/2026    semaglutide 4 MG/3ML Subcutaneous Solution Pen-injector     Sig: Inject 1 mg into the skin once a week.     Dispense:  3 mL     Refill:  3     Dx e11.65    metFORMIN  MG Oral Tablet 24 Hr     Si tab daily     Change in sig       Diabetes complications & risks surveillance:   A1C/Blood pressure: as reported    Last dilated eye exam: No data recorded Exam shows retinopathy? No data recorded  Last diabetic foot exam: Last Foot Exam: 25  Nephropathy screening:   not currently on ace /arb rx.    Lab Results   Component Value Date    EGFRCR 74 2025    MICROALBCREA 36.6 (H) 2025     LIPID screening:    Lab Results   Component Value Date    CHOLEST 158  01/27/2025    LDL 77 01/27/2025    TRIG 162 (H) 01/27/2025    HDL 53 01/27/2025    FASTING Yes 01/27/2025    FASTING Yes 01/27/2025     Cholesterol Lowering Medications            atorvastatin 20 MG Oral Tab           Note to patient: The 21 Century Cures Act makes medical notes like these available to patients in the interest of transparency. However, be advised this is a medical document. It is intended as peer to peer communication. It is written in medical language and may contain abbreviations or verbiage that are unfamiliar. It may appear blunt or direct. Medical documents are intended to carry relevant information, facts as evident, and the clinical opinion of the practitioner.         This has been done in good mitali to provide continuity of care in the best interest of the provider-patient relationship, due to the on-going public health crisis/national emergency and because of restrictions of visitation.  There are limitations of this visit as no or only very limited physical exam could be performed.  Every conscious effort was taken to allow for sufficient and adequate time.  This billing visit was spent on reviewing blood sugar trends, DM related labs, medications and decision making.  Appropriate medical decision-making and tests are ordered as detailed in the plan of care above.      Amee Bermudez understands phone or video evaluation is not a substitute for face-to-face examination or emergency care. Patient advised to go to ER or call 911 for worsening symptoms or acute distress.     Eugenie Yu, APRN

## 2025-06-03 ENCOUNTER — OFFICE VISIT (OUTPATIENT)
Dept: ENDOCRINOLOGY CLINIC | Facility: CLINIC | Age: 75
End: 2025-06-03
Payer: COMMERCIAL

## 2025-06-03 VITALS
HEART RATE: 93 BPM | WEIGHT: 250.19 LBS | RESPIRATION RATE: 17 BRPM | OXYGEN SATURATION: 94 % | BODY MASS INDEX: 47 KG/M2 | DIASTOLIC BLOOD PRESSURE: 62 MMHG | SYSTOLIC BLOOD PRESSURE: 128 MMHG

## 2025-06-03 DIAGNOSIS — E66.813 CLASS 3 SEVERE OBESITY DUE TO EXCESS CALORIES WITH SERIOUS COMORBIDITY IN ADULT: ICD-10-CM

## 2025-06-03 DIAGNOSIS — E11.65 TYPE 2 DIABETES MELLITUS WITH HYPERGLYCEMIA, WITHOUT LONG-TERM CURRENT USE OF INSULIN (HCC): Primary | ICD-10-CM

## 2025-06-03 DIAGNOSIS — I15.2 HYPERTENSION ASSOCIATED WITH DIABETES (HCC): ICD-10-CM

## 2025-06-03 DIAGNOSIS — E11.69 DYSLIPIDEMIA ASSOCIATED WITH TYPE 2 DIABETES MELLITUS (HCC): ICD-10-CM

## 2025-06-03 DIAGNOSIS — E11.21 DIABETIC NEPHROPATHY ASSOCIATED WITH TYPE 2 DIABETES MELLITUS (HCC): ICD-10-CM

## 2025-06-03 DIAGNOSIS — E78.5 DYSLIPIDEMIA ASSOCIATED WITH TYPE 2 DIABETES MELLITUS (HCC): ICD-10-CM

## 2025-06-03 DIAGNOSIS — E11.59 HYPERTENSION ASSOCIATED WITH DIABETES (HCC): ICD-10-CM

## 2025-06-03 DIAGNOSIS — E55.9 VITAMIN D DEFICIENCY: ICD-10-CM

## 2025-06-03 LAB — HEMOGLOBIN A1C: 6.2 % (ref 4.3–5.6)

## 2025-06-03 PROCEDURE — 99214 OFFICE O/P EST MOD 30 MIN: CPT | Performed by: NURSE PRACTITIONER

## 2025-06-03 PROCEDURE — 3078F DIAST BP <80 MM HG: CPT | Performed by: NURSE PRACTITIONER

## 2025-06-03 PROCEDURE — 3074F SYST BP LT 130 MM HG: CPT | Performed by: NURSE PRACTITIONER

## 2025-06-03 PROCEDURE — 83036 HEMOGLOBIN GLYCOSYLATED A1C: CPT | Performed by: NURSE PRACTITIONER

## 2025-06-03 PROCEDURE — G2211 COMPLEX E/M VISIT ADD ON: HCPCS | Performed by: NURSE PRACTITIONER

## 2025-06-03 RX ORDER — METFORMIN HYDROCHLORIDE 500 MG/1
TABLET, EXTENDED RELEASE ORAL
COMMUNITY
Start: 2025-06-03

## 2025-06-03 NOTE — PATIENT INSTRUCTIONS
A1C 6.2% (last A1c 7.6%)   Contact your [employer] to see which meter is covered -   You have also lost 13 lbs since starting working with me (and the ozempic)     Increase Ozempic 1.0mg subcutaneous once weekly       Decrease Metformin ER 2 tab daily     Continue Farxiga 10mg once daily       We can consider stopping Farxiga if your next A1C is below 6.5%     Fluctuations in blood sugars are best detected by testing your sugar with your meter.   In order for me to determine any patterns in your blood sugars, you will need to test your blood sugar 1- 2 times daily         Blood sugar targets:     Fasting blood sugar (before breakfast) Target:    (ideally less than 110)  2 hours after eating less than 180 (ideally less than  150 )     Call for blood sugars  greater than 180 more than 2 times in a week

## 2025-06-04 ENCOUNTER — ANTI-COAG (OUTPATIENT)
Dept: CARDIOLOGY | Age: 75
End: 2025-06-04

## 2025-06-04 DIAGNOSIS — Z79.01 LONG TERM (CURRENT) USE OF ANTICOAGULANTS: ICD-10-CM

## 2025-06-04 DIAGNOSIS — I74.3 EMBOLISM AND THROMBOSIS OF ARTERIES OF LOWER EXTREMITY  (CMD): Primary | ICD-10-CM

## 2025-06-11 ENCOUNTER — TELEPHONE (OUTPATIENT)
Dept: CARDIOLOGY | Age: 75
End: 2025-06-11

## 2025-06-11 ENCOUNTER — ANTI-COAG (OUTPATIENT)
Dept: CARDIOLOGY | Age: 75
End: 2025-06-11

## 2025-06-11 DIAGNOSIS — Z79.01 LONG TERM (CURRENT) USE OF ANTICOAGULANTS: ICD-10-CM

## 2025-06-11 DIAGNOSIS — I74.3 EMBOLISM AND THROMBOSIS OF ARTERIES OF LOWER EXTREMITY  (CMD): Primary | ICD-10-CM

## 2025-06-14 ENCOUNTER — NURSE ONLY (OUTPATIENT)
Dept: LAB | Age: 75
End: 2025-06-14
Attending: INTERNAL MEDICINE
Payer: COMMERCIAL

## 2025-06-14 DIAGNOSIS — Z79.01 LONG TERM (CURRENT) USE OF ANTICOAGULANTS: ICD-10-CM

## 2025-06-14 DIAGNOSIS — I74.3 EMBOLISM AND THROMBOSIS OF ARTERIES OF LOWER EXTREMITIES (HCC): Primary | ICD-10-CM

## 2025-06-14 LAB
INR BLD: 3 (ref 0.8–1.2)
INR BLDC: 3.7 (ref 0.9–1.1)
INR PPP: 3.7
PROTHROMBIN TIME: 32.6 SECONDS (ref 11.6–14.8)

## 2025-06-14 PROCEDURE — 85610 PROTHROMBIN TIME: CPT

## 2025-06-14 PROCEDURE — 36415 COLL VENOUS BLD VENIPUNCTURE: CPT

## 2025-06-16 ENCOUNTER — ANTI-COAG (OUTPATIENT)
Dept: CARDIOLOGY | Age: 75
End: 2025-06-16

## 2025-06-16 DIAGNOSIS — I74.3 EMBOLISM AND THROMBOSIS OF ARTERIES OF LOWER EXTREMITY  (CMD): Primary | ICD-10-CM

## 2025-06-16 DIAGNOSIS — Z79.01 LONG TERM (CURRENT) USE OF ANTICOAGULANTS: ICD-10-CM

## 2025-06-21 DIAGNOSIS — M19.071 OSTEOARTHRITIS OF RIGHT ANKLE, UNSPECIFIED OSTEOARTHRITIS TYPE: ICD-10-CM

## 2025-06-21 DIAGNOSIS — E11.65 TYPE 2 DIABETES MELLITUS WITH HYPERGLYCEMIA, WITHOUT LONG-TERM CURRENT USE OF INSULIN (HCC): ICD-10-CM

## 2025-06-21 DIAGNOSIS — M05.742 RHEUMATOID ARTHRITIS INVOLVING BOTH HANDS WITH POSITIVE RHEUMATOID FACTOR (HCC): ICD-10-CM

## 2025-06-21 DIAGNOSIS — M05.741 RHEUMATOID ARTHRITIS INVOLVING BOTH HANDS WITH POSITIVE RHEUMATOID FACTOR (HCC): ICD-10-CM

## 2025-06-21 RX ORDER — LANCETS
1 EACH MISCELLANEOUS 2 TIMES DAILY
Qty: 200 EACH | Refills: 1 | Status: SHIPPED | OUTPATIENT
Start: 2025-06-21

## 2025-06-21 RX ORDER — HYDROCODONE BITARTRATE AND ACETAMINOPHEN 5; 325 MG/1; MG/1
1 TABLET ORAL EVERY 6 HOURS PRN
Qty: 120 TABLET | Refills: 0 | Status: SHIPPED | OUTPATIENT
Start: 2025-06-21

## 2025-06-29 DIAGNOSIS — I10 ESSENTIAL HYPERTENSION: ICD-10-CM

## 2025-06-29 DIAGNOSIS — F32.A DEPRESSION, UNSPECIFIED DEPRESSION TYPE: ICD-10-CM

## 2025-06-29 DIAGNOSIS — E11.9 DIABETES MELLITUS WITHOUT COMPLICATION (HCC): ICD-10-CM

## 2025-06-30 ENCOUNTER — ANTI-COAG (OUTPATIENT)
Dept: CARDIOLOGY | Age: 75
End: 2025-06-30

## 2025-06-30 ENCOUNTER — NURSE ONLY (OUTPATIENT)
Dept: LAB | Age: 75
End: 2025-06-30
Attending: INTERNAL MEDICINE
Payer: COMMERCIAL

## 2025-06-30 ENCOUNTER — TELEPHONE (OUTPATIENT)
Dept: CARDIOLOGY | Age: 75
End: 2025-06-30

## 2025-06-30 DIAGNOSIS — Z79.01 LONG TERM (CURRENT) USE OF ANTICOAGULANTS: ICD-10-CM

## 2025-06-30 DIAGNOSIS — I74.3 EMBOLISM AND THROMBOSIS OF ARTERIES OF LOWER EXTREMITIES (HCC): ICD-10-CM

## 2025-06-30 DIAGNOSIS — I74.3 EMBOLISM AND THROMBOSIS OF ARTERIES OF LOWER EXTREMITY  (CMD): Primary | ICD-10-CM

## 2025-06-30 LAB
INR BLDC: 3 (ref 0.9–1.1)
INR PPP: 3

## 2025-06-30 PROCEDURE — 36415 COLL VENOUS BLD VENIPUNCTURE: CPT

## 2025-06-30 PROCEDURE — 85610 PROTHROMBIN TIME: CPT

## 2025-06-30 RX ORDER — FLUOXETINE HYDROCHLORIDE 40 MG/1
40 CAPSULE ORAL DAILY
Qty: 90 CAPSULE | Refills: 0 | Status: SHIPPED | OUTPATIENT
Start: 2025-06-30

## 2025-06-30 RX ORDER — TRIAMTERENE AND HYDROCHLOROTHIAZIDE 37.5; 25 MG/1; MG/1
1 TABLET ORAL DAILY
Qty: 90 TABLET | Refills: 0 | Status: SHIPPED | OUTPATIENT
Start: 2025-06-30

## 2025-06-30 RX ORDER — FELODIPINE 10 MG/1
10 TABLET, EXTENDED RELEASE ORAL DAILY
Qty: 90 TABLET | Refills: 0 | Status: SHIPPED | OUTPATIENT
Start: 2025-06-30

## 2025-06-30 RX ORDER — DAPAGLIFLOZIN 10 MG/1
10 TABLET, FILM COATED ORAL DAILY
Qty: 90 TABLET | Refills: 0 | Status: SHIPPED | OUTPATIENT
Start: 2025-06-30

## 2025-06-30 NOTE — TELEPHONE ENCOUNTER
Requested Prescriptions     Pending Prescriptions Disp Refills    semaglutide 4 MG/3ML Subcutaneous Solution Pen-injector 3 mL 3     Sig: Inject 1 mg into the skin once a week.     Future Appointments   Date Time Provider Department Center   7/30/2025 11:30 AM Fernanda Almonte APRN [facility]   10/1/2025  2:30 PM Eugenie Yu APRN [id number] [place]     Last A1c value was 6.2% done 6/3/2025.  Refill 06/03/25 [facility]   LOV 06/03/25 [facility]

## 2025-07-01 ENCOUNTER — ANTI-COAG (OUTPATIENT)
Dept: CARDIOLOGY | Age: 75
End: 2025-07-01

## 2025-07-01 DIAGNOSIS — I74.3 EMBOLISM AND THROMBOSIS OF ARTERIES OF LOWER EXTREMITY  (CMD): Primary | ICD-10-CM

## 2025-07-01 DIAGNOSIS — Z79.01 LONG TERM (CURRENT) USE OF ANTICOAGULANTS: ICD-10-CM

## 2025-07-20 DIAGNOSIS — I10 ESSENTIAL HYPERTENSION: Primary | ICD-10-CM

## 2025-07-20 DIAGNOSIS — E11.65 TYPE 2 DIABETES MELLITUS WITH HYPERGLYCEMIA, WITHOUT LONG-TERM CURRENT USE OF INSULIN (HCC): ICD-10-CM

## 2025-07-20 DIAGNOSIS — M05.742 RHEUMATOID ARTHRITIS INVOLVING BOTH HANDS WITH POSITIVE RHEUMATOID FACTOR (HCC): ICD-10-CM

## 2025-07-20 DIAGNOSIS — M19.071 OSTEOARTHRITIS OF RIGHT ANKLE, UNSPECIFIED OSTEOARTHRITIS TYPE: ICD-10-CM

## 2025-07-20 DIAGNOSIS — M05.741 RHEUMATOID ARTHRITIS INVOLVING BOTH HANDS WITH POSITIVE RHEUMATOID FACTOR (HCC): ICD-10-CM

## 2025-07-21 RX ORDER — HYDROCODONE BITARTRATE AND ACETAMINOPHEN 5; 325 MG/1; MG/1
1 TABLET ORAL EVERY 6 HOURS PRN
Qty: 120 TABLET | Refills: 0 | Status: SHIPPED | OUTPATIENT
Start: 2025-07-21

## 2025-07-21 NOTE — TELEPHONE ENCOUNTER
Norco 5-325 mg  Filled 6-21-25  Qty 120  0 refills  Future Appointments   Date Time Provider Department Center   7/30/2025 11:30 AM Fernanda Almonte APRN [facility]   10/1/2025  2:30 PM Eugenie Yu APRN [id number] [place]   LOV 1-29-25

## 2025-07-25 ENCOUNTER — ANTI-COAG (OUTPATIENT)
Dept: CARDIOLOGY | Age: 75
End: 2025-07-25

## 2025-07-25 ENCOUNTER — LAB ENCOUNTER (OUTPATIENT)
Dept: LAB | Age: 75
End: 2025-07-25
Attending: FAMILY MEDICINE
Payer: COMMERCIAL

## 2025-07-25 ENCOUNTER — TELEPHONE (OUTPATIENT)
Dept: CARDIOLOGY | Age: 75
End: 2025-07-25

## 2025-07-25 DIAGNOSIS — I74.3 EMBOLISM AND THROMBOSIS OF ARTERIES OF LOWER EXTREMITIES (HCC): ICD-10-CM

## 2025-07-25 DIAGNOSIS — E11.65 TYPE 2 DIABETES MELLITUS WITH HYPERGLYCEMIA, WITHOUT LONG-TERM CURRENT USE OF INSULIN (HCC): ICD-10-CM

## 2025-07-25 DIAGNOSIS — I74.3 EMBOLISM AND THROMBOSIS OF ARTERIES OF LOWER EXTREMITY  (CMD): Primary | ICD-10-CM

## 2025-07-25 DIAGNOSIS — E55.9 VITAMIN D DEFICIENCY: ICD-10-CM

## 2025-07-25 DIAGNOSIS — Z79.01 LONG TERM (CURRENT) USE OF ANTICOAGULANTS: ICD-10-CM

## 2025-07-25 DIAGNOSIS — I10 ESSENTIAL HYPERTENSION: ICD-10-CM

## 2025-07-25 LAB
ALBUMIN SERPL-MCNC: 4.5 G/DL (ref 3.2–4.8)
ALBUMIN/GLOB SERPL: 1.7 (ref 1–2)
ALP LIVER SERPL-CCNC: 90 U/L (ref 55–142)
ALT SERPL-CCNC: 21 U/L (ref 10–49)
ANION GAP SERPL CALC-SCNC: 10 MMOL/L (ref 0–18)
AST SERPL-CCNC: 24 U/L (ref ?–34)
BASOPHILS # BLD AUTO: 0.08 X10(3) UL (ref 0–0.2)
BASOPHILS NFR BLD AUTO: 1.1 %
BILIRUB SERPL-MCNC: 1.1 MG/DL (ref 0.2–1.1)
BUN BLD-MCNC: 9 MG/DL (ref 9–23)
BUN/CREAT SERPL: 12.3 (ref 10–20)
CALCIUM BLD-MCNC: 9.6 MG/DL (ref 8.7–10.4)
CHLORIDE SERPL-SCNC: 102 MMOL/L (ref 98–112)
CHOLEST SERPL-MCNC: 117 MG/DL (ref ?–200)
CO2 SERPL-SCNC: 26 MMOL/L (ref 21–32)
CREAT BLD-MCNC: 0.73 MG/DL (ref 0.55–1.02)
DEPRECATED RDW RBC AUTO: 45.7 FL (ref 35.1–46.3)
EGFRCR SERPLBLD CKD-EPI 2021: 87 ML/MIN/1.73M2 (ref 60–?)
EOSINOPHIL # BLD AUTO: 0.24 X10(3) UL (ref 0–0.7)
EOSINOPHIL NFR BLD AUTO: 3.3 %
ERYTHROCYTE [DISTWIDTH] IN BLOOD BY AUTOMATED COUNT: 13.7 % (ref 11–15)
FASTING PATIENT LIPID ANSWER: NO
FASTING STATUS PATIENT QL REPORTED: NO
GLOBULIN PLAS-MCNC: 2.7 G/DL (ref 2–3.5)
GLUCOSE BLD-MCNC: 119 MG/DL (ref 70–99)
HCT VFR BLD AUTO: 46.1 % (ref 35–48)
HDLC SERPL-MCNC: 51 MG/DL (ref 40–59)
HGB BLD-MCNC: 14.9 G/DL (ref 12–16)
IMM GRANULOCYTES # BLD AUTO: 0.03 X10(3) UL (ref 0–1)
IMM GRANULOCYTES NFR BLD: 0.4 %
INR BLDC: 2.5 (ref 0.9–1.1)
INR PPP: 2.5
LDLC SERPL CALC-MCNC: 44 MG/DL (ref ?–100)
LYMPHOCYTES # BLD AUTO: 1.1 X10(3) UL (ref 1–4)
LYMPHOCYTES NFR BLD AUTO: 15 %
MCH RBC QN AUTO: 29.1 PG (ref 26–34)
MCHC RBC AUTO-ENTMCNC: 32.3 G/DL (ref 31–37)
MCV RBC AUTO: 90 FL (ref 80–100)
MONOCYTES # BLD AUTO: 0.44 X10(3) UL (ref 0.1–1)
MONOCYTES NFR BLD AUTO: 6 %
NEUTROPHILS # BLD AUTO: 5.45 X10 (3) UL (ref 1.5–7.7)
NEUTROPHILS # BLD AUTO: 5.45 X10(3) UL (ref 1.5–7.7)
NEUTROPHILS NFR BLD AUTO: 74.2 %
NONHDLC SERPL-MCNC: 66 MG/DL (ref ?–130)
OSMOLALITY SERPL CALC.SUM OF ELEC: 286 MOSM/KG (ref 275–295)
PLATELET # BLD AUTO: 236 10(3)UL (ref 150–450)
POTASSIUM SERPL-SCNC: 3.9 MMOL/L (ref 3.5–5.1)
PROT SERPL-MCNC: 7.2 G/DL (ref 5.7–8.2)
RBC # BLD AUTO: 5.12 X10(6)UL (ref 3.8–5.3)
SODIUM SERPL-SCNC: 138 MMOL/L (ref 136–145)
TRIGL SERPL-MCNC: 126 MG/DL (ref 30–149)
TSI SER-ACNC: 1.97 UIU/ML (ref 0.55–4.78)
VIT D+METAB SERPL-MCNC: 43.4 NG/ML (ref 30–100)
VLDLC SERPL CALC-MCNC: 18 MG/DL (ref 0–30)
WBC # BLD AUTO: 7.3 X10(3) UL (ref 4–11)

## 2025-07-25 PROCEDURE — 80061 LIPID PANEL: CPT | Performed by: FAMILY MEDICINE

## 2025-07-25 PROCEDURE — 82306 VITAMIN D 25 HYDROXY: CPT | Performed by: NURSE PRACTITIONER

## 2025-07-25 PROCEDURE — 80050 GENERAL HEALTH PANEL: CPT | Performed by: NURSE PRACTITIONER

## 2025-07-30 ENCOUNTER — OFFICE VISIT (OUTPATIENT)
Dept: INTERNAL MEDICINE CLINIC | Facility: CLINIC | Age: 75
End: 2025-07-30
Payer: COMMERCIAL

## 2025-07-30 VITALS
HEART RATE: 96 BPM | TEMPERATURE: 97 F | BODY MASS INDEX: 45.85 KG/M2 | SYSTOLIC BLOOD PRESSURE: 138 MMHG | DIASTOLIC BLOOD PRESSURE: 70 MMHG | WEIGHT: 246 LBS | RESPIRATION RATE: 18 BRPM | HEIGHT: 61.5 IN | OXYGEN SATURATION: 98 %

## 2025-07-30 DIAGNOSIS — N39.3 STRESS INCONTINENCE, FEMALE: ICD-10-CM

## 2025-07-30 DIAGNOSIS — I10 HTN (HYPERTENSION), BENIGN: Primary | ICD-10-CM

## 2025-07-30 DIAGNOSIS — M05.742 RHEUMATOID ARTHRITIS INVOLVING BOTH HANDS WITH POSITIVE RHEUMATOID FACTOR (HCC): ICD-10-CM

## 2025-07-30 DIAGNOSIS — L30.4 INTERTRIGO: ICD-10-CM

## 2025-07-30 DIAGNOSIS — I74.3 EMBOLISM AND THROMBOSIS OF ARTERIES OF LOWER EXTREMITIES (HCC): ICD-10-CM

## 2025-07-30 DIAGNOSIS — F33.0 MILD EPISODE OF RECURRENT MAJOR DEPRESSIVE DISORDER: ICD-10-CM

## 2025-07-30 DIAGNOSIS — E78.2 MIXED HYPERLIPIDEMIA: ICD-10-CM

## 2025-07-30 DIAGNOSIS — E11.65 TYPE 2 DIABETES MELLITUS WITH HYPERGLYCEMIA, WITHOUT LONG-TERM CURRENT USE OF INSULIN (HCC): ICD-10-CM

## 2025-07-30 DIAGNOSIS — M05.741 RHEUMATOID ARTHRITIS INVOLVING BOTH HANDS WITH POSITIVE RHEUMATOID FACTOR (HCC): ICD-10-CM

## 2025-07-30 LAB
CREAT UR-SCNC: 56.4 MG/DL
MICROALBUMIN UR-MCNC: <0.3 MG/DL

## 2025-07-30 PROCEDURE — 3051F HG A1C>EQUAL 7.0%<8.0%: CPT | Performed by: FAMILY MEDICINE

## 2025-07-30 PROCEDURE — 82570 ASSAY OF URINE CREATININE: CPT | Performed by: FAMILY MEDICINE

## 2025-07-30 PROCEDURE — 3008F BODY MASS INDEX DOCD: CPT | Performed by: FAMILY MEDICINE

## 2025-07-30 PROCEDURE — 3060F POS MICROALBUMINURIA REV: CPT | Performed by: FAMILY MEDICINE

## 2025-07-30 PROCEDURE — 3044F HG A1C LEVEL LT 7.0%: CPT | Performed by: FAMILY MEDICINE

## 2025-07-30 PROCEDURE — 3075F SYST BP GE 130 - 139MM HG: CPT | Performed by: FAMILY MEDICINE

## 2025-07-30 PROCEDURE — 3078F DIAST BP <80 MM HG: CPT | Performed by: FAMILY MEDICINE

## 2025-07-30 PROCEDURE — 99214 OFFICE O/P EST MOD 30 MIN: CPT | Performed by: FAMILY MEDICINE

## 2025-07-30 PROCEDURE — 82043 UR ALBUMIN QUANTITATIVE: CPT | Performed by: FAMILY MEDICINE

## 2025-07-30 PROCEDURE — G2211 COMPLEX E/M VISIT ADD ON: HCPCS | Performed by: FAMILY MEDICINE

## 2025-07-30 PROCEDURE — 3061F NEG MICROALBUMINURIA REV: CPT | Performed by: FAMILY MEDICINE

## 2025-07-30 RX ORDER — MIRABEGRON 25 MG/1
25 TABLET, FILM COATED, EXTENDED RELEASE ORAL DAILY
Qty: 90 TABLET | Refills: 0 | OUTPATIENT
Start: 2025-07-30

## 2025-07-30 RX ORDER — NYSTATIN 100000 U/G
1 CREAM TOPICAL 2 TIMES DAILY
Qty: 30 G | Refills: 1 | Status: SHIPPED | OUTPATIENT
Start: 2025-07-30

## 2025-07-30 RX ORDER — WARFARIN SODIUM 5 MG/1
5 TABLET ORAL DAILY
Qty: 90 TABLET | Refills: 0 | OUTPATIENT
Start: 2025-07-30

## 2025-07-30 RX ORDER — ATORVASTATIN CALCIUM 20 MG/1
20 TABLET, FILM COATED ORAL DAILY
Qty: 90 TABLET | Refills: 1 | Status: SHIPPED | OUTPATIENT
Start: 2025-07-30

## 2025-07-30 RX ORDER — WARFARIN SODIUM 5 MG/1
5 TABLET ORAL DAILY
Qty: 90 TABLET | Refills: 0 | Status: SHIPPED | OUTPATIENT
Start: 2025-07-30

## 2025-07-30 RX ORDER — ATORVASTATIN CALCIUM 20 MG/1
20 TABLET, FILM COATED ORAL DAILY
Qty: 90 TABLET | Refills: 1 | OUTPATIENT
Start: 2025-07-30

## 2025-07-30 RX ORDER — MIRABEGRON 25 MG/1
25 TABLET, FILM COATED, EXTENDED RELEASE ORAL DAILY
Qty: 90 TABLET | Refills: 1 | Status: SHIPPED | OUTPATIENT
Start: 2025-07-30

## 2025-07-31 RX ORDER — METFORMIN HYDROCHLORIDE 500 MG/1
TABLET, EXTENDED RELEASE ORAL
Qty: 60 TABLET | Refills: 0 | Status: SHIPPED | OUTPATIENT
Start: 2025-07-31

## 2025-08-24 DIAGNOSIS — M19.071 OSTEOARTHRITIS OF RIGHT ANKLE, UNSPECIFIED OSTEOARTHRITIS TYPE: ICD-10-CM

## 2025-08-24 DIAGNOSIS — M05.742 RHEUMATOID ARTHRITIS INVOLVING BOTH HANDS WITH POSITIVE RHEUMATOID FACTOR (HCC): ICD-10-CM

## 2025-08-24 DIAGNOSIS — M05.741 RHEUMATOID ARTHRITIS INVOLVING BOTH HANDS WITH POSITIVE RHEUMATOID FACTOR (HCC): ICD-10-CM

## 2025-08-25 ENCOUNTER — ANTI-COAG (OUTPATIENT)
Dept: CARDIOLOGY | Age: 75
End: 2025-08-25

## 2025-08-25 DIAGNOSIS — I74.3 EMBOLISM AND THROMBOSIS OF ARTERIES OF LOWER EXTREMITY  (CMD): Primary | ICD-10-CM

## 2025-08-25 DIAGNOSIS — Z79.01 LONG TERM (CURRENT) USE OF ANTICOAGULANTS: ICD-10-CM

## 2025-08-25 RX ORDER — HYDROCODONE BITARTRATE AND ACETAMINOPHEN 5; 325 MG/1; MG/1
1 TABLET ORAL EVERY 6 HOURS PRN
Qty: 120 TABLET | Refills: 0 | Status: SHIPPED | OUTPATIENT
Start: 2025-08-25

## 2025-09-02 ENCOUNTER — ANTI-COAG (OUTPATIENT)
Dept: CARDIOLOGY | Age: 75
End: 2025-09-02

## 2025-09-02 DIAGNOSIS — I74.3 EMBOLISM AND THROMBOSIS OF ARTERIES OF LOWER EXTREMITY  (CMD): Primary | ICD-10-CM

## 2025-09-02 DIAGNOSIS — Z79.01 LONG TERM (CURRENT) USE OF ANTICOAGULANTS: ICD-10-CM

## 2025-09-29 ENCOUNTER — APPOINTMENT (OUTPATIENT)
Dept: CARDIOLOGY | Age: 75
End: 2025-09-29

## (undated) DIAGNOSIS — M19.071 OSTEOARTHRITIS OF RIGHT ANKLE, UNSPECIFIED OSTEOARTHRITIS TYPE: ICD-10-CM

## (undated) DIAGNOSIS — I10 ESSENTIAL HYPERTENSION: ICD-10-CM

## (undated) DIAGNOSIS — M05.741 RHEUMATOID ARTHRITIS INVOLVING BOTH HANDS WITH POSITIVE RHEUMATOID FACTOR (HCC): ICD-10-CM

## (undated) DIAGNOSIS — M05.742 RHEUMATOID ARTHRITIS INVOLVING BOTH HANDS WITH POSITIVE RHEUMATOID FACTOR (HCC): ICD-10-CM

## (undated) DIAGNOSIS — F32.A DEPRESSION, UNSPECIFIED DEPRESSION TYPE: ICD-10-CM

## (undated) NOTE — MR AVS SNAPSHOT
[address] [place] [address] [facility]  [phone]               Thank you for choosing us for your health care visit with CONNOR Pimentel. We are glad to serve you and happy to provide you with this summary of your visit.   Kasey · Mild fever  A second infection, this time due to bacteria, may then occur. And airways irritated by allergens or smoke are more likely to get infected.        Inflamed airway: Inflammation and extra mucus narrow the airway, causing shortness of breath. increases your risk of getting an infection later that is antibiotic-resistant. Your provider will prescribe antibiotics if the infection is caused by bacteria.  If they are prescribed:  · Take the medicine until it is used up, even if symptoms have improve Inhale 2 puffs into the lungs every 6 (six) hours as needed for Wheezing. Atorvastatin Calcium 10 MG Tabs   Take 1 tablet (10 mg total) by mouth once daily.    Commonly known as:  LIPITOR           cefdinir 300 MG Caps   Take 1 capsule (300 mg tot your doctor or other care provider to review them with you.          Where to Get Your Medications      These medications were sent to [id number] [facility]. [address] [phone], [address] [address] [address]     Ph including white bread, rice and pasta   Eat plenty of protein, keep the fat content low Sugars:  sodas and sports drinks, candies and desserts   Eat plenty of low-fat dairy products High fat meats and dairy   Choose whole grain products Foods high in sodiu

## (undated) NOTE — LETTER
07/11/19        [address]      Dear Sakshi Giron records indicate that you have outstanding lab work and or testing that was ordered for you and has not yet been completed:  Orders Placed This Encounter      Micro

## (undated) NOTE — LETTER
07/14/21        [address]      Dear Mojgan Gutierrez records indicate that you have outstanding lab work and or testing that was ordered for you and has not yet been completed:  Orders Placed This Encounter      Vitam

## (undated) NOTE — MR AVS SNAPSHOT
[place]  [address] [facility]  [address] [facility] [id number] [phone]               Thank you for choosing us for your health care visit with Meghna Rdz NP.   We are glad to serve you and happy to provide you with this sum and this prescription was added. Make sure you understand how and when to take each. Commonly known as:  NORCO           * HYDROcodone-acetaminophen 5-325 MG Tabs   Take 1 tablet by mouth every 6 (six) hours as needed for Pain. What changed:   You were Today's Orders     CBC W Differential W Platelet [E]    Complete by: Feb 09, 2017 (Approximate)    Assoc Dx:  Depressive disorder [F32.9], Elevated glucose [R73.09]           Comp Metabolic Panel (14) [E]    Complete by:   Feb 09, 2017 (Approximat

## (undated) NOTE — LETTER
01/18/21        [address]      Dear Piedad Cortez records indicate that you have outstanding lab work and or testing that was ordered for you and has not yet been completed:  Orders Placed This Encounter      Occul

## (undated) NOTE — LETTER
Date & Time: 9/16/2023, 9:46 AM  Patient: Raymondo Floor  Encounter Provider(s):    Dalila Vargas MD       To Whom It May Concern:    Prashanth Cruz was seen and treated in our department on 9/16/2023. She  was seen at this facility today. Please excuse her  from work as he had to bring the patient here to be evaluated. Thank you for your assistance .     If you have any questions or concerns, please do not hesitate to call.        _____________________________  Physician/APC Signature

## (undated) NOTE — LETTER
04/20/18        [address]      Dear [facility] records indicate that you have outstanding lab work and or testing that was ordered for you and has not yet been completed:          CBC With Differential With Plat